# Patient Record
Sex: FEMALE | Race: WHITE | Employment: OTHER | URBAN - METROPOLITAN AREA
[De-identification: names, ages, dates, MRNs, and addresses within clinical notes are randomized per-mention and may not be internally consistent; named-entity substitution may affect disease eponyms.]

---

## 2018-04-24 RX ORDER — LISINOPRIL 5 MG/1
TABLET ORAL DAILY
COMMUNITY

## 2018-05-02 ENCOUNTER — ANESTHESIA EVENT (OUTPATIENT)
Dept: ENDOSCOPY | Age: 78
End: 2018-05-02
Payer: MEDICARE

## 2018-05-03 ENCOUNTER — ANESTHESIA (OUTPATIENT)
Dept: ENDOSCOPY | Age: 78
End: 2018-05-03
Payer: MEDICARE

## 2018-05-03 ENCOUNTER — HOSPITAL ENCOUNTER (OUTPATIENT)
Age: 78
Setting detail: OUTPATIENT SURGERY
Discharge: HOME | End: 2018-05-03
Attending: INTERNAL MEDICINE | Admitting: INTERNAL MEDICINE
Payer: MEDICARE

## 2018-05-03 VITALS
DIASTOLIC BLOOD PRESSURE: 81 MMHG | BODY MASS INDEX: 29.53 KG/M2 | TEMPERATURE: 97.2 F | SYSTOLIC BLOOD PRESSURE: 155 MMHG | RESPIRATION RATE: 20 BRPM | OXYGEN SATURATION: 97 % | HEIGHT: 64 IN | HEART RATE: 92 BPM | WEIGHT: 173 LBS

## 2018-05-03 LAB
BUN BLD-MCNC: 26 MG/DL (ref 7–18)
CHLORIDE BLD-SCNC: 102 MMOL/L (ref 100–108)
GLUCOSE BLD STRIP.AUTO-MCNC: 117 MG/DL (ref 74–106)
HCT VFR BLD CALC: 28 % (ref 36–49)
HGB BLD-MCNC: 9.5 G/DL (ref 12–16)
POTASSIUM BLD-SCNC: 4.3 MMOL/L (ref 3.5–5.5)
SODIUM BLD-SCNC: 138 MMOL/L (ref 136–145)

## 2018-05-03 PROCEDURE — C1726 CATH, BAL DIL, NON-VASCULAR: HCPCS | Performed by: INTERNAL MEDICINE

## 2018-05-03 PROCEDURE — 74011000250 HC RX REV CODE- 250

## 2018-05-03 PROCEDURE — 77030031670 HC DEV INFL ENDOTEK BIG60 MRTM -B: Performed by: INTERNAL MEDICINE

## 2018-05-03 PROCEDURE — 74011000250 HC RX REV CODE- 250: Performed by: NURSE ANESTHETIST, CERTIFIED REGISTERED

## 2018-05-03 PROCEDURE — 76060000031 HC ANESTHESIA FIRST 0.5 HR: Performed by: INTERNAL MEDICINE

## 2018-05-03 PROCEDURE — 74011250636 HC RX REV CODE- 250/636: Performed by: NURSE ANESTHETIST, CERTIFIED REGISTERED

## 2018-05-03 PROCEDURE — 77030019988 HC FCPS ENDOSC DISP BSC -B: Performed by: INTERNAL MEDICINE

## 2018-05-03 PROCEDURE — 88312 SPECIAL STAINS GROUP 1: CPT | Performed by: INTERNAL MEDICINE

## 2018-05-03 PROCEDURE — 77030018846 HC SOL IRR STRL H20 ICUM -A: Performed by: INTERNAL MEDICINE

## 2018-05-03 PROCEDURE — 88313 SPECIAL STAINS GROUP 2: CPT | Performed by: INTERNAL MEDICINE

## 2018-05-03 PROCEDURE — 76040000019: Performed by: INTERNAL MEDICINE

## 2018-05-03 PROCEDURE — 77030008565 HC TBNG SUC IRR ERBE -B: Performed by: INTERNAL MEDICINE

## 2018-05-03 PROCEDURE — 88305 TISSUE EXAM BY PATHOLOGIST: CPT | Performed by: INTERNAL MEDICINE

## 2018-05-03 PROCEDURE — 74011250636 HC RX REV CODE- 250/636

## 2018-05-03 PROCEDURE — 84132 ASSAY OF SERUM POTASSIUM: CPT

## 2018-05-03 RX ORDER — SODIUM CHLORIDE 0.9 % (FLUSH) 0.9 %
5-10 SYRINGE (ML) INJECTION AS NEEDED
Status: CANCELLED | OUTPATIENT
Start: 2018-05-03

## 2018-05-03 RX ORDER — ONDANSETRON 2 MG/ML
4 INJECTION INTRAMUSCULAR; INTRAVENOUS ONCE
Status: CANCELLED | OUTPATIENT
Start: 2018-05-03 | End: 2018-05-03

## 2018-05-03 RX ORDER — SODIUM CHLORIDE, SODIUM LACTATE, POTASSIUM CHLORIDE, CALCIUM CHLORIDE 600; 310; 30; 20 MG/100ML; MG/100ML; MG/100ML; MG/100ML
75 INJECTION, SOLUTION INTRAVENOUS CONTINUOUS
Status: CANCELLED | OUTPATIENT
Start: 2018-05-03

## 2018-05-03 RX ORDER — LIDOCAINE HYDROCHLORIDE 10 MG/ML
0.1 INJECTION, SOLUTION EPIDURAL; INFILTRATION; INTRACAUDAL; PERINEURAL AS NEEDED
Status: DISCONTINUED | OUTPATIENT
Start: 2018-05-03 | End: 2018-05-03 | Stop reason: HOSPADM

## 2018-05-03 RX ORDER — LIDOCAINE HYDROCHLORIDE 20 MG/ML
INJECTION, SOLUTION EPIDURAL; INFILTRATION; INTRACAUDAL; PERINEURAL AS NEEDED
Status: DISCONTINUED | OUTPATIENT
Start: 2018-05-03 | End: 2018-05-03 | Stop reason: HOSPADM

## 2018-05-03 RX ORDER — SODIUM CHLORIDE, SODIUM LACTATE, POTASSIUM CHLORIDE, CALCIUM CHLORIDE 600; 310; 30; 20 MG/100ML; MG/100ML; MG/100ML; MG/100ML
50 INJECTION, SOLUTION INTRAVENOUS CONTINUOUS
Status: DISCONTINUED | OUTPATIENT
Start: 2018-05-03 | End: 2018-05-03 | Stop reason: HOSPADM

## 2018-05-03 RX ORDER — PROPOFOL 10 MG/ML
INJECTION, EMULSION INTRAVENOUS AS NEEDED
Status: DISCONTINUED | OUTPATIENT
Start: 2018-05-03 | End: 2018-05-03 | Stop reason: HOSPADM

## 2018-05-03 RX ADMIN — LIDOCAINE HYDROCHLORIDE 20 MG: 20 INJECTION, SOLUTION EPIDURAL; INFILTRATION; INTRACAUDAL; PERINEURAL at 09:59

## 2018-05-03 RX ADMIN — PROPOFOL 20 MG: 10 INJECTION, EMULSION INTRAVENOUS at 09:59

## 2018-05-03 RX ADMIN — SODIUM CHLORIDE 20 MG: 9 INJECTION INTRAMUSCULAR; INTRAVENOUS; SUBCUTANEOUS at 09:14

## 2018-05-03 RX ADMIN — PROPOFOL 20 MG: 10 INJECTION, EMULSION INTRAVENOUS at 10:04

## 2018-05-03 RX ADMIN — LIDOCAINE HYDROCHLORIDE 20 MG: 20 INJECTION, SOLUTION EPIDURAL; INFILTRATION; INTRACAUDAL; PERINEURAL at 10:04

## 2018-05-03 RX ADMIN — SODIUM CHLORIDE, SODIUM LACTATE, POTASSIUM CHLORIDE, AND CALCIUM CHLORIDE 50 ML/HR: 600; 310; 30; 20 INJECTION, SOLUTION INTRAVENOUS at 09:12

## 2018-05-03 NOTE — PERIOP NOTES
Patient armband removed and shredded  Patient confirmed by two identifiers with discharge instructions prior to being provided to patient and sister in law

## 2018-05-03 NOTE — ANESTHESIA POSTPROCEDURE EVALUATION
Post-Anesthesia Evaluation and Assessment    Patient: Jihan Ashraf MRN: [id number]  SSN: [id number]    YOB: 1940  Age: 76 y.o. Sex: female       Cardiovascular Function/Vital Signs  Visit Vitals    /65    Pulse 88    Temp 36.9 °C (98.4 °F)    Resp 14    Ht 5' 4\" (1.626 m)    Wt 78.5 kg (173 lb)    SpO2 97%    BMI 29.7 kg/m2       Patient is status post MAC anesthesia for Procedure(s):  ENDOSCOPY WITH ESOPHAGEAL DILATATION w biopsies. Nausea/Vomiting: None    Postoperative hydration reviewed and adequate. Pain:  Pain Scale 1: Numeric (0 - 10) (05/03/18 1017)  Pain Intensity 1: 0 (05/03/18 1017)   Managed    Neurological Status: At baseline    Mental Status and Level of Consciousness: Arousable    Pulmonary Status:   O2 Device: Room air (05/03/18 1017)   Adequate oxygenation and airway patent    Complications related to anesthesia: None    Post-anesthesia assessment completed.  No concerns    Signed By: Kayli Franco MD     May 3, 2018

## 2018-05-03 NOTE — ANESTHESIA PREPROCEDURE EVALUATION
Anesthetic History   No history of anesthetic complications            Review of Systems / Medical History  Patient summary reviewed and pertinent labs reviewed    Pulmonary  Within defined limits                 Neuro/Psych   Within defined limits           Cardiovascular    Hypertension: well controlled        Dysrhythmias : atrial fibrillation      Exercise tolerance: >4 METS     GI/Hepatic/Renal     GERD: poorly controlled           Endo/Other             Other Findings   Comments: Documentation of current medication  Current medications obtained, documented and obtained? YES      Risk Factors for Postoperative nausea/vomiting:       History of postoperative nausea/vomiting? NO       Female? YES       Motion sickness? NO       Intended opioid administration for postoperative analgesia? NO      Smoking Abstinence:  Current Smoker? NO  Elective Surgery? YES  Seen preoperatively by anesthesiologist or proxy prior to day of surgery? YES  Pt abstained from smoking 24 hours prior to anesthesia?  N/A    Preventive care/screening for High Blood Pressure:  Aged 18 years and older: YES  Screened for high blood pressure: YES  Patients with high blood pressure referred to primary care provider   for BP management: YES                 Physical Exam    Airway  Mallampati: II  TM Distance: 4 - 6 cm  Neck ROM: normal range of motion   Mouth opening: Normal     Cardiovascular  Regular rate and rhythm,  S1 and S2 normal,  no murmur, click, rub, or gallop             Dental  No notable dental hx       Pulmonary  Breath sounds clear to auscultation               Abdominal  GI exam deferred       Other Findings            Anesthetic Plan    ASA: 3            Induction: Intravenous  Anesthetic plan and risks discussed with: Patient

## 2018-05-03 NOTE — IP AVS SNAPSHOT
Dorie Garrett 
 
 
 [address] [facility] [address] [facility] Patient: Iva Ordonez MRN: [id number] :1940 About your hospitalization You were admitted on:  May 3, 2018 You last received care in the:  [facility] PHASE 2 RECOVERY You were discharged on:  May 3, 2018 Why you were hospitalized Your primary diagnosis was:  Not on File Follow-up Information Follow up With Details Comments Contact Info Gary Roe NP   [address] [address] 
[phone] Leonard Foote MD   [address] [facility] [address] [facility] [address] 
[phone] Discharge Orders None A check desiree indicates which time of day the medication should be taken. My Medications CONTINUE taking these medications Instructions Each Dose to Equal  
 Morning Noon Evening Bedtime ALLEGRA 180 mg tablet Generic drug:  fexofenadine Your last dose was: Your next dose is: Take  by mouth daily. aspirin 81 mg tablet Your last dose was: Your next dose is:    
   
   
 81 Tabs. Take 81 mg by Mouth Once a Day. 81 Tab COLACE 100 mg capsule Generic drug:  docusate sodium Your last dose was: Your next dose is: Take 100 mg by mouth daily. 100 mg ESTRACE 1 mg tablet Generic drug:  estradiol Your last dose was: Your next dose is: Take 1 mg by Mouth Once a Day. HYDROcodone-acetaminophen 5-325 mg per tablet Commonly known as:  Bolivar Medina Your last dose was: Your next dose is: Take 1 tablet by mouth every four (4) hours as needed for Pain. 1 Tab  
    
   
   
   
  
 lisinopril 5 mg tablet Commonly known as:  Kevin Gilmore  
   
 Your last dose was: Your next dose is: Take  by mouth daily. MIRAPEX 0.125 mg tablet Generic drug:  pramipexole Your last dose was: Your next dose is: Take 1 Tab by Mouth 3 Times Daily. naproxen 500 mg tablet Commonly known as:  NAPROSYN Your last dose was: Your next dose is: Take 500 mg by mouth daily. 500 mg NEURONTIN 300 mg capsule Generic drug:  gabapentin Your last dose was: Your next dose is:    
   
   
 300 mg. Take 1 Cap by Mouth 3 Times Daily. 300 mg NexIUM 40 mg capsule Generic drug:  esomeprazole Your last dose was: Your next dose is: Take 1 Cap by Mouth Once a Day. NORVASC 10 mg tablet Generic drug:  amLODIPine Your last dose was: Your next dose is:    
   
   
 10 mg. Take 1 Tab by Mouth Once a Day. 10 mg  
    
   
   
   
  
 potassium chloride 10 mEq tablet Commonly known as:  KLOR-CON Your last dose was: Your next dose is: Take 1 Tab by mouth daily. 10 mEq  
    
   
   
   
  
 traMADol 50 mg tablet Commonly known as:  ULTRAM  
   
Your last dose was: Your next dose is: Take 50 mg by mouth every six (6) hours as needed for Pain. 50 mg  
    
   
   
   
  
 triamcinolone acetonide 0.1 % ointment Commonly known as:  KENALOG Your last dose was: Your next dose is:    
   
   
 Apply  to affected area two (2) times a day. use thin layer Opioid Education Prescription Opioids: What You Need to Know: 
 
Prescription opioids can be used to help relieve moderate-to-severe pain and are often prescribed following a surgery or injury, or for certain health conditions.   These medications can be an important part of treatment but also come with serious risks. Opioids are strong pain medicines. Examples include hydrocodone, oxycodone, fentanyl, and morphine. Heroin is an example of an illegal opioid. It is important to work with your health care provider to make sure you are getting the safest, most effective care. WHAT ARE THE RISKS AND SIDE EFFECTS OF OPIOID USE? Prescription opioids carry serious risks of addiction and overdose, especially with prolonged use. An opioid overdose, often marked by slow breathing, can cause sudden death. The use of prescription opioids can have a number of side effects as well, even when taken as directed. · Tolerance-meaning you might need to take more of a medication for the same pain relief · Physical dependence-meaning you have symptoms of withdrawal when the medication is stopped. Withdrawal symptoms can include nausea, sweating, chills, diarrhea, stomach cramps, and muscle aches. Withdrawal can last up to several weeks, depending on which drug you took and how long you took it. · Increased sensitivity to pain · Constipation · Nausea, vomiting, and dry mouth · Sleepiness and dizziness · Confusion · Depression · Low levels of testosterone that can result in lower sex drive, energy, and strength · Itching and sweating RISKS ARE GREATER WITH:      
· History of drug misuse, substance use disorder, or overdose · Mental health conditions (such as depression or anxiety) · Sleep apnea · Older age (72 years or older) · Pregnancy Avoid alcohol while taking prescription opioids. Also, unless specifically advised by your health care provider, medications to avoid include: · Benzodiazepines (such as Xanax or Valium) · Muscle relaxants (such as Soma or Flexeril) · Hypnotics (such as Ambien or Lunesta) · Other prescription opioids KNOW YOUR OPTIONS Talk to your health care provider about ways to manage your pain that don't involve prescription opioids. Some of these options may actually work better and have fewer risks and side effects. Options may include: 
· Pain relievers such as acetaminophen, ibuprofen, and naproxen · Some medications that are also used for depression or seizures · Physical therapy and exercise · Counseling to help patients learn how to cope better with triggers of pain and stress. · Application of heat or cold compress · Massage therapy · Relaxation techniques Be Informed Make sure you know the name of your medication, how much and how often to take it, and its potential risks & side effects. IF YOU ARE PRESCRIBED OPIOIDS FOR PAIN: 
· Never take opioids in greater amounts or more often than prescribed. Remember the goal is not to be pain-free but to manage your pain at a tolerable level. · Follow up with your primary care provider to: · Work together to create a plan on how to manage your pain. · Talk about ways to help manage your pain that don't involve prescription opioids. · Talk about any and all concerns and side effects. · Help prevent misuse and abuse. · Never sell or share prescription opioids · Help prevent misuse and abuse. · Store prescription opioids in a secure place and out of reach of others (this may include visitors, children, friends, and family). · Safely dispose of unused/unwanted prescription opioids: Find your community drug take-back program or your pharmacy mail-back program, or flush them down the toilet, following guidance from the Food and Drug Administration (www.fda.gov/Drugs/ResourcesForYou). · Visit www.cdc.gov/drugoverdose to learn about the risks of opioid abuse and overdose. · If you believe you may be struggling with addiction, tell your health care provider and ask for guidance or call [address] [place], [place] [address] at 3-062-687-QJCP. Discharge Instructions Esophageal Dilation: What to Expect at [facility] Your Recovery After you have esophageal dilation, you will stay at the hospital or surgery center for 1 to 2 hours. This will allow the medicine to wear off. You will be able to go home after your doctor or nurse checks to make sure you are not having any problems. This care sheet gives you a general idea about how long it will take for you to recover. But each person recovers at a different pace. Follow the steps below to get better as quickly as possible. How can you care for yourself at home? Activity ? · Rest as much as you need to after you go home. ? · You should be able to go back to your usual activities the day after the procedure. Diet ? · Follow your doctor's directions for eating after the procedure. ? · Drink plenty of fluids (unless your doctor has told you not to). Medicines ? · Your doctor will tell you if and when you can restart your medicines. He or she will also give you instructions about taking any new medicines. ? · If you take blood thinners, such as warfarin (Coumadin), clopidogrel (Plavix), or aspirin, be sure to talk to your doctor. He or she will tell you if and when to start taking those medicines again. Make sure that you understand exactly what your doctor wants you to do. ? · If you have a sore throat the day after the procedure, use an over-the-counter spray to numb your throat. Sucking on throat lozenges and gargling with warm salt water may also help relieve your symptoms. Follow-up care is a key part of your treatment and safety. Be sure to make and go to all appointments, and call your doctor if you are having problems. It's also a good idea to know your test results and keep a list of the medicines you take. When should you call for help? Call 911 anytime you think you may need emergency care. For example, call if: 
? · You passed out (lost consciousness). ? · You have trouble breathing. ? · Your stools are maroon or very bloody ? Call your doctor now or seek immediate medical care if: 
? · You have new or worse belly pain. ? · You have a fever. ? · You have new or more blood in your stools. ? · You are sick to your stomach and cannot drink fluids. ? · You cannot pass stools or gas. ? · You have pain that does not get better after you take pain medicine. ? Watch closely for changes in your health, and be sure to contact your doctor if: 
? · Your throat still hurts after a day or two. ? · You do not get better as expected. Where can you learn more? Go to http://shawn-stevie.info/. Enter L524 in the search box to learn more about \"Esophageal Dilation: What to Expect at Home. \" Current as of: May 12, 2017 Content Version: 11.4 © 6320-9014 [employer]. Care instructions adapted under license by [employer] (which disclaims liability or warranty for this information). If you have questions about a medical condition or this instruction, always ask your healthcare professional. [address] any warranty or liability for your use of this information. Candidiasis: Care Instructions Your Care Instructions Candidiasis (say \"hoy-xbk-WI-uh-quincy\") is a yeast infection. Yeast normally lives in your body. But it can cause problems if your body's defenses don't work as they should. Some medicines can increase your chance of getting a yeast infection. These include antibiotics, steroids, and cancer drugs. And some diseases like AIDS and diabetes can make you more likely to get yeast infections. There are different types of yeast infections. Masood Payment is a yeast infection in the mouth. It usually occurs in people with weak immune systems. It causes white patches inside the mouth and throat.  
Yeast infections of the skin usually occur in skin folds where the skin stays moist. They cause red, oozing patches on your skin. Babies can get these infections under the diaper. People who often wear gloves can get them on their hands. Many women get vaginal yeast infections. They are most common when women take antibiotics. These infections can cause the vagina to itch and burn. They also cause white discharge that looks like cottage cheese. In rare cases, yeast infects the blood. This can cause serious disease. This kind of infection is treated with medicine given through a needle into a vein (IV). After you start treatment, a yeast infection usually goes away quickly. But if your immune system is weak, the infection may come back. Tell your doctor if you get yeast infections often. Follow-up care is a key part of your treatment and safety. Be sure to make and go to all appointments, and call your doctor if you are having problems. It's also a good idea to know your test results and keep a list of the medicines you take. How can you care for yourself at home? · Take your medicines exactly as prescribed. Call your doctor if you think you are having a problem with your medicine. · Use antibiotics only as directed by your doctor. · Eat yogurt with live cultures. It has bacteria called lactobacillus. It may help prevent some types of yeast infections. · Keep your skin clean and dry. Put powder on moist places. · If you are using a cream or suppository to treat a vaginal yeast infection, don't use condoms or a diaphragm. Use a different type of birth control. · Eat a healthy diet and get regular exercise. This will help keep your immune system strong. When should you call for help? Watch closely for changes in your health, and be sure to contact your doctor if: 
? · You do not get better as expected. Where can you learn more? Go to http://shawn-stevie.info/. Enter G012 in the search box to learn more about \"Candidiasis: Care Instructions. \" 
 Current as of: October 13, 2016 Content Version: 11.4 © 0012-4171 [employer]. Care instructions adapted under license by [employer] (which disclaims liability or warranty for this information). If you have questions about a medical condition or this instruction, always ask your healthcare professional. [address] any warranty or liability for your use of this information. Fluconazole (Diflucan) - (By mouth) Why this medicine is used:  
Prevents and treats fungal infections. Contact a nurse or doctor right away if you have: · Blistering, peeling, red skin rash · Fast, pounding, or uneven heartbeat; lightheadedness or fainting · Dark urine or pale stools, vomiting, loss of appetite · Yellow skin or eyes Common side effects: · Mild nausea, vomiting, stomach pain, diarrhea 
· Headache © 2017 [address] Information is for End User's use only and may not be sold, redistributed or otherwise used for commercial purposes. DISCHARGE SUMMARY from Nurse PATIENT INSTRUCTIONS: 
 
 
F-face looks uneven A-arms unable to move or move unevenly S-speech slurred or non-existent T-time-call 911 as soon as signs and symptoms begin-DO NOT go Back to bed or wait to see if you get better-TIME IS BRAIN. Warning Signs of HEART ATTACK Call 911 if you have these symptoms: 
? Chest discomfort. Most heart attacks involve discomfort in the center of the chest that lasts more than a few minutes, or that goes away and comes back. It can feel like uncomfortable pressure, squeezing, fullness, or pain. ? Discomfort in other areas of the upper body. Symptoms can include pain or discomfort in one or both arms, the back, neck, jaw, or stomach. ? Shortness of breath with or without chest discomfort. ? Other signs may include breaking out in a cold sweat, nausea, or lightheadedness. Don't wait more than five minutes to call [address]! Fast action can save your life. Calling 911 is almost always the fastest way to get lifesaving treatment. Emergency Medical Services staff can begin treatment when they arrive  up to an hour sooner than if someone gets to the hospital by car. The discharge information has been reviewed with the patient and sister-in-law. The patient and sister-in-law verbalized understanding. Discharge medications reviewed with the patient and sister-in-law and appropriate educational materials and side effects teaching were provided. ___________________________________________________________________________________________________________________________________ Introducing \[facility]\"" & HEALTH SERVICES! Emmanuel Barrett introduces [employer] patient portal. Now you can access parts of your medical record, email your doctor's office, and request medication refills online. 1. In your internet browser, go to [email or web address] 2. Click on the First Time User? Click Here link in the Sign In box. You will see the New Member Sign Up page. 3. Enter your [employer] Access Code exactly as it appears below. You will not need to use this code after youve completed the sign-up process. If you do not sign up before the expiration date, you must request a new code. · [employer] Access Code: [email or web address] Expires: 7/29/2018  9:55 AM 
 
4. Enter the last four digits of your Social Security Number (xxxx) and Date of Birth (mm/dd/yyyy) as indicated and click Submit. You will be taken to the next sign-up page. 5. Create a [employer] ID. This will be your [employer] login ID and cannot be changed, so think of one that is secure and easy to remember. 6. Create a [employer] password. You can change your password at any time. 7. Enter your Password Reset Question and Answer. This can be used at a later time if you forget your password. 8. Enter your e-mail address. You will receive e-mail notification when new information is available in [address] 9. Click Sign Up. You can now view and download portions of your medical record. 10. Click the Download Summary menu link to download a portable copy of your medical information. If you have questions, please visit the Frequently Asked Questions section of the [employer] website. Remember, [employer] is NOT to be used for urgent needs. For medical emergencies, dial 911. Now available from your iPhone and Android! Introducing Nav Davalos As a [employer] patient, I wanted to make you aware of our electronic visit tool called Nav Davalos. [employer] 24/7 allows you to connect within minutes with a medical provider 24 hours a day, seven days a week via a mobile device or tablet or logging into a secure website from your computer. You can access Nav Davalos from anywhere in the [place]. A virtual visit might be right for you when you have a simple condition and feel like you just dont want to get out of bed, or cant get away from work for an appointment, when your regular [employer] provider is not available (evenings, weekends or holidays), or when youre out of town and need minor care. Electronic visits cost only $49 and if the [employer] 24/7 provider determines a prescription is needed to treat your condition, one can be electronically transmitted to a nearby pharmacy*. Please take a moment to enroll today if you have not already done so. The enrollment process is free and takes just a few minutes. To enroll, please download the [employer] 24/7 meng to your tablet or phone, or visit [email or web address] to enroll on your computer.    
And, as an [address] [place] [address] patient with a [employer] account, the results of your visits will be scanned into your electronic medical record and your primary care provider will be able to view the scanned results. We urge you to continue to see your regular [facility] provider for your ongoing medical care. And while your primary care provider may not be the one available when you seek a Nav Fallon[employer] virtual visit, the peace of mind you get from getting a real diagnosis real time can be priceless. For more information on [employer], view our Frequently Asked Questions (FAQs) at [email or web address]. Sincerely, 
 
Rebecca Alexis MD 
Chief Medical Officer [address] *:  certain medications cannot be prescribed via [employer] Providers Seen During Your Hospitalization Provider Specialty Primary office phone Amelia Gaines MD Gastroenterology [phone] Your Primary Care Physician (PCP) Primary Care Physician Office Phone Office Fax Shelia Patricia [phone] You are allergic to the following Allergen Reactions Ciprofloxacin Nausea Only Penicillins Rash Nausea Only Sulfa (Sulfonamide Antibiotics) Other (comments) Per patient stated made her feel bad Recent Documentation Height Weight BMI Smoking Status 1.626 m 78.5 kg 29.7 kg/m2 Never Smoker Emergency Contacts Name Discharge Info Relation Home Work Mobile [facility] [place] DISCHARGE CAREGIVER [3] Daughter [21] [phone] PadminiGilbert DISCHARGE CAREGIVER [3] Son [22] [phone] Patient Belongings The following personal items are in your possession at time of discharge: 
  Dental Appliances: None  Visual Aid: Glasses Please provide this summary of care documentation to your next provider. Signatures-by signing, you are acknowledging that this After Visit Summary has been reviewed with you and you have received a copy. Patient Signature:  ____________________________________________________________ Date:  ____________________________________________________________  
  
Gearldine Moulds Provider Signature:  ____________________________________________________________ Date:  ____________________________________________________________

## 2018-05-03 NOTE — DISCHARGE INSTRUCTIONS
Esophageal Dilation: What to Expect at [address] [place] [address]  After you have esophageal dilation, you will stay at the hospital or surgery center for 1 to 2 hours. This will allow the medicine to wear off. You will be able to go home after your doctor or nurse checks to make sure you are not having any problems. This care sheet gives you a general idea about how long it will take for you to recover. But each person recovers at a different pace. Follow the steps below to get better as quickly as possible. How can you care for yourself at home? Activity  ? · Rest as much as you need to after you go home. ? · You should be able to go back to your usual activities the day after the procedure. Diet  ? · Follow your doctor's directions for eating after the procedure. ? · Drink plenty of fluids (unless your doctor has told you not to). Medicines  ? · Your doctor will tell you if and when you can restart your medicines. He or she will also give you instructions about taking any new medicines. ? · If you take blood thinners, such as warfarin (Coumadin), clopidogrel (Plavix), or aspirin, be sure to talk to your doctor. He or she will tell you if and when to start taking those medicines again. Make sure that you understand exactly what your doctor wants you to do. ? · If you have a sore throat the day after the procedure, use an over-the-counter spray to numb your throat. Sucking on throat lozenges and gargling with warm salt water may also help relieve your symptoms. Follow-up care is a key part of your treatment and safety. Be sure to make and go to all appointments, and call your doctor if you are having problems. It's also a good idea to know your test results and keep a list of the medicines you take. When should you call for help? Call 911 anytime you think you may need emergency care. For example, call if:  ? · You passed out (lost consciousness). ? · You have trouble breathing.    ? · Your stools are maroon or very bloody   ? Call your doctor now or seek immediate medical care if:  ? · You have new or worse belly pain. ? · You have a fever. ? · You have new or more blood in your stools. ? · You are sick to your stomach and cannot drink fluids. ? · You cannot pass stools or gas. ? · You have pain that does not get better after you take pain medicine. ? Watch closely for changes in your health, and be sure to contact your doctor if:  ? · Your throat still hurts after a day or two. ? · You do not get better as expected. Where can you learn more? Go to http://shawn-stevie.info/. Enter S872 in the search box to learn more about \"Esophageal Dilation: What to Expect at Home. \"  Current as of: May 12, 2017  Content Version: 11.4  © 4489-7145 [employer]. Care instructions adapted under license by [employer] (which disclaims liability or warranty for this information). If you have questions about a medical condition or this instruction, always ask your healthcare professional. [address] any warranty or liability for your use of this information. Candidiasis: Care Instructions  Your Care Instructions  Candidiasis (say \"usp-qsl-FH-uh-quincy\") is a yeast infection. Yeast normally lives in your body. But it can cause problems if your body's defenses don't work as they should. Some medicines can increase your chance of getting a yeast infection. These include antibiotics, steroids, and cancer drugs. And some diseases like AIDS and diabetes can make you more likely to get yeast infections. There are different types of yeast infections. Kaleta Dennis is a yeast infection in the mouth. It usually occurs in people with weak immune systems. It causes white patches inside the mouth and throat. Yeast infections of the skin usually occur in skin folds where the skin stays moist. They cause red, oozing patches on your skin.  Babies can get these infections under the diaper. People who often wear gloves can get them on their hands. Many women get vaginal yeast infections. They are most common when women take antibiotics. These infections can cause the vagina to itch and burn. They also cause white discharge that looks like cottage cheese. In rare cases, yeast infects the blood. This can cause serious disease. This kind of infection is treated with medicine given through a needle into a vein (IV). After you start treatment, a yeast infection usually goes away quickly. But if your immune system is weak, the infection may come back. Tell your doctor if you get yeast infections often. Follow-up care is a key part of your treatment and safety. Be sure to make and go to all appointments, and call your doctor if you are having problems. It's also a good idea to know your test results and keep a list of the medicines you take. How can you care for yourself at home? · Take your medicines exactly as prescribed. Call your doctor if you think you are having a problem with your medicine. · Use antibiotics only as directed by your doctor. · Eat yogurt with live cultures. It has bacteria called lactobacillus. It may help prevent some types of yeast infections. · Keep your skin clean and dry. Put powder on moist places. · If you are using a cream or suppository to treat a vaginal yeast infection, don't use condoms or a diaphragm. Use a different type of birth control. · Eat a healthy diet and get regular exercise. This will help keep your immune system strong. When should you call for help? Watch closely for changes in your health, and be sure to contact your doctor if:  ? · You do not get better as expected. Where can you learn more? Go to http://shawn-stevie.info/. Enter I105 in the search box to learn more about \"Candidiasis: Care Instructions. \"  Current as of: October 13, 2016  Content Version: 11.4  © 5117-8788 [employer], [employer].  Care instructions adapted under license by [employer] (which disclaims liability or warranty for this information). If you have questions about a medical condition or this instruction, always ask your healthcare professional. [address] any warranty or liability for your use of this information. Fluconazole (Diflucan) - (By mouth)   Why this medicine is used:   Prevents and treats fungal infections. Contact a nurse or doctor right away if you have:  · Blistering, peeling, red skin rash  · Fast, pounding, or uneven heartbeat; lightheadedness or fainting  · Dark urine or pale stools, vomiting, loss of appetite  · Yellow skin or eyes     Common side effects:  · Mild nausea, vomiting, stomach pain, diarrhea  · Headache  © 2017 [address] [employer]  Information is for End User's use only and may not be sold, redistributed or otherwise used for commercial purposes. DISCHARGE SUMMARY from Nurse    PATIENT INSTRUCTIONS:    After general anesthesia or intravenous sedation, for 24 hours or while taking prescription Narcotics:  · Limit your activities  · Do not drive and operate hazardous machinery  · Do not make important personal or business decisions  · Do  not drink alcoholic beverages  · If you have not urinated within 8 hours after discharge, please contact your surgeon on call. Report the following to your surgeon:  · Excessive pain, swelling, redness or odor of or around the surgical area  · Temperature over 100.5  · Nausea and vomiting lasting longer than 4 hours or if unable to take medications  · Any signs of decreased circulation or nerve impairment to extremity: change in color, persistent  numbness, tingling, coldness or increase pain  · Any questions    *  Please give a list of your current medications to your Primary Care Provider.     *  Please update this list whenever your medications are discontinued, doses are      changed, or new medications (including over-the-counter products) are added. *  Please carry medication information at all times in case of emergency situations. These are general instructions for a healthy lifestyle:    No smoking/ No tobacco products/ Avoid exposure to second hand smoke  Surgeon General's Warning:  Quitting smoking now greatly reduces serious risk to your health. Obesity, smoking, and sedentary lifestyle greatly increases your risk for illness    A healthy diet, regular physical exercise & weight monitoring are important for maintaining a healthy lifestyle    You may be retaining fluid if you have a history of heart failure or if you experience any of the following symptoms:  Weight gain of 3 pounds or more overnight or 5 pounds in a week, increased swelling in our hands or feet or shortness of breath while lying flat in bed. Please call your doctor as soon as you notice any of these symptoms; do not wait until your next office visit. Recognize signs and symptoms of STROKE:    F-face looks uneven    A-arms unable to move or move unevenly    S-speech slurred or non-existent    T-time-call 911 as soon as signs and symptoms begin-DO NOT go       Back to bed or wait to see if you get better-TIME IS BRAIN. Warning Signs of HEART ATTACK     Call 911 if you have these symptoms:   Chest discomfort. Most heart attacks involve discomfort in the center of the chest that lasts more than a few minutes, or that goes away and comes back. It can feel like uncomfortable pressure, squeezing, fullness, or pain.  Discomfort in other areas of the upper body. Symptoms can include pain or discomfort in one or both arms, the back, neck, jaw, or stomach.  Shortness of breath with or without chest discomfort.  Other signs may include breaking out in a cold sweat, nausea, or lightheadedness. Don't wait more than five minutes to call 911 - MINUTES MATTER! Fast action can save your life.  Calling 911 is almost always the fastest way to get lifesaving treatment. Emergency Medical Services staff can begin treatment when they arrive -- up to an hour sooner than if someone gets to the hospital by car. The discharge information has been reviewed with the patient and sister-in-law. The patient and sister-in-law verbalized understanding. Discharge medications reviewed with the patient and sister-in-law and appropriate educational materials and side effects teaching were provided.   ___________________________________________________________________________________________________________________________________

## 2018-10-10 ENCOUNTER — HOSPITAL ENCOUNTER (OUTPATIENT)
Dept: LAB | Age: 78
Discharge: HOME | End: 2018-10-10
Payer: MEDICARE

## 2018-10-10 ENCOUNTER — HOSPITAL ENCOUNTER (OUTPATIENT)
Dept: ONCOLOGY | Age: 78
Discharge: HOME | End: 2018-10-10

## 2018-10-10 ENCOUNTER — OFFICE VISIT (OUTPATIENT)
Dept: ONCOLOGY | Age: 78
End: 2018-10-10

## 2018-10-10 VITALS
RESPIRATION RATE: 16 BRPM | SYSTOLIC BLOOD PRESSURE: 126 MMHG | HEIGHT: 64 IN | DIASTOLIC BLOOD PRESSURE: 64 MMHG | BODY MASS INDEX: 30.39 KG/M2 | WEIGHT: 178 LBS | OXYGEN SATURATION: 100 % | HEART RATE: 101 BPM | TEMPERATURE: 97.1 F

## 2018-10-10 DIAGNOSIS — D50.8 IRON DEFICIENCY ANEMIA SECONDARY TO INADEQUATE DIETARY IRON INTAKE: ICD-10-CM

## 2018-10-10 DIAGNOSIS — D72.9 NEUTROPHILIC LEUKOCYTOSIS: ICD-10-CM

## 2018-10-10 DIAGNOSIS — D50.8 IRON DEFICIENCY ANEMIA SECONDARY TO INADEQUATE DIETARY IRON INTAKE: Primary | ICD-10-CM

## 2018-10-10 DIAGNOSIS — D75.839 THROMBOCYTOSIS: ICD-10-CM

## 2018-10-10 PROBLEM — D69.6 THROMBOCYTOPENIA (HCC): Status: ACTIVE | Noted: 2018-10-10

## 2018-10-10 PROBLEM — A41.9 SEPSIS (HCC): Status: ACTIVE | Noted: 2017-10-18

## 2018-10-10 PROBLEM — I48.0 PAROXYSMAL ATRIAL FIBRILLATION (HCC): Status: ACTIVE | Noted: 2017-10-25

## 2018-10-10 PROBLEM — J18.9 CAP (COMMUNITY ACQUIRED PNEUMONIA): Status: ACTIVE | Noted: 2017-10-18

## 2018-10-10 PROBLEM — R07.9 CHEST PAIN: Status: ACTIVE | Noted: 2018-03-09

## 2018-10-10 PROBLEM — I50.32 DIASTOLIC DYSFUNCTION WITH CHRONIC HEART FAILURE (HCC): Status: ACTIVE | Noted: 2018-05-01

## 2018-10-10 PROBLEM — I48.91 A-FIB (HCC): Status: ACTIVE | Noted: 2018-06-29

## 2018-10-10 PROBLEM — D69.6 THROMBOCYTOPENIA (HCC): Status: RESOLVED | Noted: 2018-10-10 | Resolved: 2018-10-10

## 2018-10-10 PROBLEM — R07.89 CHEST TIGHTNESS: Status: ACTIVE | Noted: 2018-10-10

## 2018-10-10 PROBLEM — J18.9 COMMUNITY ACQUIRED PNEUMONIA OF LEFT LOWER LOBE OF LUNG: Status: ACTIVE | Noted: 2017-10-18

## 2018-10-10 LAB
BASOPHILS # BLD: 0.1 K/UL (ref 0–0.1)
BASOPHILS NFR BLD: 1 % (ref 0–2)
COMMENT , 490046: NORMAL
DIFFERENTIAL METHOD BLD: ABNORMAL
EOSINOPHIL # BLD: 0.4 K/UL (ref 0–0.4)
EOSINOPHIL NFR BLD: 4 % (ref 0–5)
ERYTHROCYTE [DISTWIDTH] IN BLOOD BY AUTOMATED COUNT: 17.2 % (ref 11.6–14.5)
FLOW INTERPRETATION: NORMAL
HCT VFR BLD AUTO: 22.9 % (ref 35–45)
HGB BLD-MCNC: 6.7 G/DL (ref 12–16)
LYMPHOCYTES # BLD: 2.4 K/UL (ref 0.9–3.6)
LYMPHOCYTES NFR BLD: 23 % (ref 21–52)
MCH RBC QN AUTO: 22.6 PG (ref 24–34)
MCHC RBC AUTO-ENTMCNC: 29.3 G/DL (ref 31–37)
MCV RBC AUTO: 77.4 FL (ref 74–97)
MONOCYTES # BLD: 0.7 K/UL (ref 0.05–1.2)
MONOCYTES NFR BLD: 7 % (ref 3–10)
NEUTS SEG # BLD: 6.7 K/UL (ref 1.8–8)
NEUTS SEG NFR BLD: 65 % (ref 40–73)
PATHOLOGIST NAME: NORMAL
PLATELET # BLD AUTO: 483 K/UL (ref 135–420)
PMV BLD AUTO: 8.6 FL (ref 9.2–11.8)
RBC # BLD AUTO: 2.96 M/UL (ref 4.2–5.3)
RBC MORPH BLD: ABNORMAL
RBC MORPH BLD: ABNORMAL
SPECIMEN SOURCE: NORMAL
SPECIMEN SOURCE: NORMAL
WBC # BLD AUTO: 10.3 K/UL (ref 4.6–13.2)

## 2018-10-10 PROCEDURE — 88184 FLOWCYTOMETRY/ TC 1 MARKER: CPT | Performed by: INTERNAL MEDICINE

## 2018-10-10 PROCEDURE — 80053 COMPREHEN METABOLIC PANEL: CPT | Performed by: INTERNAL MEDICINE

## 2018-10-10 PROCEDURE — 36415 COLL VENOUS BLD VENIPUNCTURE: CPT | Performed by: INTERNAL MEDICINE

## 2018-10-10 PROCEDURE — 82668 ASSAY OF ERYTHROPOIETIN: CPT | Performed by: INTERNAL MEDICINE

## 2018-10-10 PROCEDURE — 83540 ASSAY OF IRON: CPT | Performed by: INTERNAL MEDICINE

## 2018-10-10 PROCEDURE — 88185 FLOWCYTOMETRY/TC ADD-ON: CPT | Performed by: INTERNAL MEDICINE

## 2018-10-10 PROCEDURE — 84165 PROTEIN E-PHORESIS SERUM: CPT | Performed by: INTERNAL MEDICINE

## 2018-10-10 PROCEDURE — 82728 ASSAY OF FERRITIN: CPT | Performed by: INTERNAL MEDICINE

## 2018-10-10 RX ORDER — NITROFURANTOIN 25; 75 MG/1; MG/1
CAPSULE ORAL
COMMUNITY
Start: 2018-09-26 | End: 2020-06-19 | Stop reason: SDUPTHER

## 2018-10-10 RX ORDER — NYSTATIN 100000 [USP'U]/ML
SUSPENSION ORAL
Refills: 0 | COMMUNITY
Start: 2018-08-20 | End: 2021-06-09

## 2018-10-10 RX ORDER — DILTIAZEM HYDROCHLORIDE 360 MG/1
CAPSULE, EXTENDED RELEASE ORAL
COMMUNITY
Start: 2018-08-05

## 2018-10-10 RX ORDER — APIXABAN 5 MG/1
TABLET, FILM COATED ORAL
COMMUNITY
Start: 2018-09-10

## 2018-10-10 RX ORDER — HYDROXYCHLOROQUINE SULFATE 200 MG/1
TABLET, FILM COATED ORAL
COMMUNITY
Start: 2018-10-03

## 2018-10-10 RX ORDER — MUPIROCIN 20 MG/G
OINTMENT TOPICAL
COMMUNITY
Start: 2018-10-04

## 2018-10-10 NOTE — MR AVS SNAPSHOT
[address] [facility], [place] 
 
 
 [address], [address] [facility] [address] 
[phone] Patient: Nelida Gray MRN: [id number] :1940 Visit Information Date & Time Provider Department Dept. Phone Encounter #  
 10/10/2018 11:00 AM Felipe Winters MD [facility] Medical Oncology [phone] [id number] Your Appointments 10/24/2018 11:00 AM  
Office Visit with Felipe Winters MD  
[address]  Oncology [address] [facility]) Appt Note: 2 weeks/results [address], [address] [facility] [address] [place] [address], [address] [place] [address] Upcoming Health Maintenance Date Due DTaP/Tdap/Td series (1 - Tdap) 10/13/1959 Shingrix Vaccine Age 50> (1 of 2) 10/13/1988 GLAUCOMA SCREENING Q2Y 10/13/2003 Bone Densitometry (Dexa) Screening 10/13/2003 MEDICARE YEARLY EXAM 3/14/2018 Influenza Age 5 to Adult 2018 Allergies as of 10/10/2018  Review Complete On: 10/10/2018 By: Felipe Winters MD  
  
 Severity Noted Reaction Type Reactions Ciprofloxacin  2018    Nausea Only Penicillins  2014    Rash, Nausea Only Sulfa (Sulfonamide Antibiotics)  10/09/2014    Other (comments) Per patient stated made her feel bad Current Immunizations  Never Reviewed Name Date Influenza Vaccine 10/20/2017 12:00 AM, 10/4/2013 12:00 AM, 10/11/2012 12:00 AM  
 Influenza Vaccine (Quad) 2011 12:00 AM  
 Pneumococcal Conjugate (PCV-13) 2018 12:00 AM  
 Pneumococcal Polysaccharide (PPSV-23) 2014 12:00 AM  
  
 Not reviewed this visit You Were Diagnosed With   
  
 Codes Comments Iron deficiency anemia secondary to inadequate dietary iron intake    -  Primary ICD-10-CM: D50.8 ICD-9-CM: 280. 1 Thrombocytosis ([address]     ICD-10-CM: D47.3 ICD-9-CM: 238.71 Neutrophilic leukocytosis     ICD-10-CM: D72.9 ICD-9-CM: 944. 8 Vitals BP Pulse Temp Resp Height(growth percentile) Weight(growth percentile) 126/64 (!) 101 97.1 °F (36.2 °C) (Oral) 16 5' 4\" (1.626 m) 178 lb (80.7 kg) SpO2 BMI Smoking Status 100% 30.55 kg/m2 Never Smoker BMI and BSA Data Body Mass Index Body Surface Area 30.55 kg/m 2 1.91 m 2 Your Updated Medication List  
  
   
This list is accurate as of 10/10/18 12:30 PM.  Always use your most recent med list. ALLEGRA 180 mg tablet Generic drug:  fexofenadine Take  by mouth daily. aspirin 81 mg tablet 81 Tabs. Take 81 mg by Mouth Once a Day. COLACE 100 mg capsule Generic drug:  docusate sodium Take 100 mg by mouth daily. dilTIAZem 360 mg ER capsule Commonly known as:  TIAZAC  
  
 ELIQUIS 5 mg tablet Generic drug:  apixaban ESTRACE 1 mg tablet Generic drug:  estradiol Take 1 mg by Mouth Once a Day. HYDROcodone-acetaminophen 5-325 mg per tablet Commonly known as:  Almeta Reasoner Take 1 tablet by mouth every four (4) hours as needed for Pain.  
  
 hydroxychloroquine 200 mg tablet Commonly known as:  PLAQUENIL  
  
 lisinopril 5 mg tablet Commonly known as:  Cydne Bella Take  by mouth daily. MIRAPEX 0.125 mg tablet Generic drug:  pramipexole Take 1 Tab by Mouth 3 Times Daily. mupirocin 2 % ointment Commonly known as:  BACTROBAN  
  
 naproxen 500 mg tablet Commonly known as:  NAPROSYN Take 500 mg by mouth daily. NEURONTIN 300 mg capsule Generic drug:  gabapentin 300 mg. Take 1 Cap by Mouth 3 Times Daily. NexIUM 40 mg capsule Generic drug:  esomeprazole Take 1 Cap by Mouth Once a Day. nitrofurantoin (macrocrystal-monohydrate) 100 mg capsule Commonly known as:  MACROBID  
  
 NORVASC 10 mg tablet Generic drug:  amLODIPine 10 mg. Take 1 Tab by Mouth Once a Day. nystatin 100,000 unit/mL suspension Commonly known as:  MYCOSTATIN  
 RINSE WITH 2-5MLS BY MOUTH AND SWALLOW FOUR TIMES A DAY  
  
 potassium chloride 10 mEq tablet Commonly known as:  KLOR-CON Take 1 Tab by mouth daily. traMADol 50 mg tablet Commonly known as:  ULTRAM  
Take 50 mg by mouth every six (6) hours as needed for Pain.  
  
 triamcinolone acetonide 0.1 % ointment Commonly known as:  KENALOG Apply  to affected area two (2) times a day. use thin layer We Performed the Following COMPLETE CBC & AUTO DIFF WBC [63022 CPT(R)] To-Do List   
 10/10/2018 Lab:  CBC WITH 3 PART DIFF Introducing [facility] SERVICES! Dear Nadeem Garcia: Thank you for requesting a Re[employer] account. Our records indicate that you already have an active Repl[employer] account. You can access your account anytime at [email or web address] Did you know that you can access your hospital and ER discharge instructions at any time in Repligen? You can also review all of your test results from your hospital stay or ER visit. Additional Information If you have questions, please visit the Frequently Asked Questions section of the Repligen website at [email or web address] Remember, Repligen is NOT to be used for urgent needs. For medical emergencies, dial 911. Now available from your iPhone and Android! Please provide this summary of care documentation to your next provider. Your primary care clinician is listed as Dariana Whitney. If you have any questions after today's visit, please call [phone].

## 2018-10-10 NOTE — PROGRESS NOTES
Hematology/Oncology Consultation Note Name: Leilani Castaneda Date: 10/10/2018 : 1940 Herlinda Adams NP  
 
 
Ms. Vanda Santa  is a 76 y.o. woman who was referred for evaluation of anemia. Subjective: Chief complaint: Anemia History of present illness: Ms. Vanda Santa is a 70-year-old woman who states that she had been scheduled to have a left total knee replacement on 10/9/2018 unfortunately presurgical lab data revealed that she was quite anemic with a hemoglobin of approximately 6.8 g/dL with hematocrit of 23.3%. WBC was also elevated at 12.7 and a platelet count was elevated at 445,000. The patient denies having any gastrointestinal or genitourinary blood loss. She does ambulate with the use of a walker. She denies having any fainting episodes but is continuing to have a significant degree of fatigue and weakness. At the time that the lab tests were obtained she was not given an option for transfusion. Therefore, she was referred here for an assessment of her anemia. She has no additional complaints except for the pain in her left knee. The patient reports that she has not taking any over-the-counter iron supplements. She has to limit her activity due to the knee pain and due to the weakness most likely associated with her severe anemia. Past Medical History:  
Diagnosis Date  Arrhythmia   
 hx afib  Edema 10/9/2014  
 sec to dependency, chf, mr, knee surgeries & norvasc reduce salt & fluid as no other Sx now  Essential hypertension  GERD (gastroesophageal reflux disease)  Malignant neoplasm of skin 2010  Murmur, cardiac Allergies Allergen Reactions  Ciprofloxacin Nausea Only  Penicillins Rash and Nausea Only  Sulfa (Sulfonamide Antibiotics) Other (comments) Per patient stated made her feel bad Past Surgical History:  
Procedure Laterality Date  HX HYSTERECTOMY  HX KNEE REPLACEMENT    
 HX ORTHOPAEDIC    
 bilateral feet surgery Social History Social History  Marital status: UNKNOWN Spouse name: N/A  
 Number of children: N/A  
 Years of education: N/A Occupational History  Not on file. Social History Main Topics  Smoking status: Never Smoker  Smokeless tobacco: Never Used  Alcohol use No  
 Drug use: No  
 Sexual activity: Not on file Other Topics Concern  Not on file Social History Narrative Family History Problem Relation Age of Onset  Heart Surgery Neg Hx   
 Heart Attack Neg Hx  Stroke Neg Hx Current Outpatient Prescriptions Medication Sig Dispense Refill  ELIQUIS 5 mg tablet  dilTIAZem (TIAZAC) 360 mg ER capsule  mupirocin (BACTROBAN) 2 % ointment  hydroxychloroquine (PLAQUENIL) 200 mg tablet  nitrofurantoin, macrocrystal-monohydrate, (MACROBID) 100 mg capsule  nystatin (MYCOSTATIN) 100,000 unit/mL suspension RINSE WITH 2-5MLS BY MOUTH AND SWALLOW FOUR TIMES A DAY  0  
 lisinopril (PRINIVIL, ZESTRIL) 5 mg tablet Take  by mouth daily.  potassium chloride (K-DUR, KLOR-CON) 10 mEq tablet Take 1 Tab by mouth daily. 30 Tab 1  
 naproxen (NAPROSYN) 500 mg tablet Take 500 mg by mouth daily.  fexofenadine (ALLEGRA) 180 mg tablet Take  by mouth daily.  triamcinolone acetonide (KENALOG) 0.1 % ointment Apply  to affected area two (2) times a day. use thin layer  hydrocodone-acetaminophen (NORCO) 5-325 mg per tablet Take 1 tablet by mouth every four (4) hours as needed for Pain.  docusate sodium (COLACE) 100 mg capsule Take 100 mg by mouth daily.  traMADol (ULTRAM) 50 mg tablet Take 50 mg by mouth every six (6) hours as needed for Pain.  amLODIPine (NORVASC) 10 mg tablet 10 mg. Take 1 Tab by Mouth Once a Day.  aspirin 81 mg tablet 81 Tabs. Take 81 mg by Mouth Once a Day.  gabapentin (NEURONTIN) 300 mg capsule 300 mg. Take 1 Cap by Mouth 3 Times Daily.  esomeprazole (NEXIUM) 40 mg capsule Take 1 Cap by Mouth Once a Day.  pramipexole (MIRAPEX) 0.125 mg tablet Take 1 Tab by Mouth 3 Times Daily.  estradiol (ESTRACE) 1 mg tablet Take 1 mg by Mouth Once a Day. Review of Systems General ROS:The patient has complaints of slowly progressive fatigue and weakness. Psychological ROS: patient denies having any psychological symptoms such as hallucinations, depression or anxiety. Ophthalmic ROS:the patient denies having any visual impairment or eye discomfort. ENT ROS: there are no abnormalities reported. Allergy and Immunology ROS:the patient denies having any seasonal allergies or allergies to medications other than those already outlined above. Hematological and Lymphatic ROS: the patient denies having any bruising, bleeding or lymphadenopathy. Endocrine ROS: the patient denies having any heat or cold intolerance. There is no history of diabetes or thyroid disorders. Breast ROS: the patient denies having any history of breast mass, nipple discharge, or lumps. Respiratory ROS:the patient denies having any cough, shortness of breath, or dyspnea on exertion. Cardiovascular ROS: there are no complaints of chest pain, palpitations, chest pounding, or dyspnea on exertion. Gastrointestinal ROS: the patient denies having nausea, emesis, diarrhea, constipation, or blood in the stool. Genito-Urinary ROS: the patient denies having urinary urgency, frequency, or dysuria. Musculoskeletal ROS: The patient has significant pain and discomfort in the left knee. Tentatively she is hoping to be able to have a knee replacement surgery in the upcoming month. Neurological ROS: the patient denies having any numbness, tingling, or neurologic deficits. Dermatological ROS:patient denies having any unexplained rash, skin ulcerations, or hives. Objective:  
 
Visit Vitals  /64  Pulse (!) 101  Temp 97.1 °F (36.2 °C) (Oral)  Resp 16  
  Ht 5' 4\" (1.626 m)  Wt 80.7 kg (178 lb)  SpO2 100%  BMI 30.55 kg/m2 ECOGPS=0 Physical Exam:  
Gen. Appearance: the patient is in no acute distress. Skin: There is no evidence of bruise or rash. HEENT: The head is normocephalic and atraumatic. The conjunctiva and sclera are clear. Pupils are equal, round, reactive to light, and accommodation. The extraocular movements are intact. ENT reveals no oral mucosal lesions or ulcerations. Neck: Supple without lymphadenopathy or thyromegaly. Lungs: Clear to auscultation and percussion; there are no wheezes or rhonchi. Heart: Regular rate and rhythm; there are no murmurs, gallops, or rubs. Abdomen: Bowel sounds are present and normal.  There is no guarding, tenderness, or hepatosplenomegaly. Extremities: There is no clubbing, cyanosis, or edema. Neurologic: There are no focal neurologic deficits. Lymphatics: There is no palpable peripheral lymphadenopathy. Musculoskeletal: Patient has limited range of motion of the left knee. She is using a walker for mobility support. Lab data: The CBC dated 9/26/2018 showed a WBC count of 12.7, hemoglobin 6.8 g/dL, hematocrit 23.3%, and the platelet count was 825,957 absolute neutrophil count was elevated at 8.9. Urinalysis was negative. Assessment:  
Severe anemia of unclear etiology: She most likely has versus an underlying myelodysplastic syndrome or chronic kidney disease with associated anemia. An additional diagnostic consideration includes the possibility that she has an underlying plasma cell dyscrasia as well. Thrombocytosis and leukocytosis: Patient has both elevated platelet counts and WBCs. This may be a reactive process to severe anemia evolving plasma cell dyscrasia, myeloproliferative disorder, or lymphoproliferative. Plan:  
Severe anemia of unclear etiology:  At this time I will obtain a current CBC, comprehensive metabolic panel, iron profile, ferritin level, erythropoietin level, and serum protein electrophoresis. If her hemoglobin remains below 7 g/dL she will be referred to the hospital for transfusion of 2 units of packed red blood cells. Thrombocytosis and leukocytosis: Flow cytometry will be ordered for further assessment to rule out any evidence that she is developing an underlying myeloproliferative for lymphoproliferative disorder. Follow-up in 2 weeks to review lab data and to discuss potential options of management. Orders Placed This Encounter  COMPLETE CBC & AUTO DIFF WBC  InHouse CBC ("[employer] Standing Status:   Future Standing Expiration Date:   10/17/2018  METABOLIC PANEL, COMPREHENSIVE Standing Status:   Future Standing Expiration Date:   10/11/2019  
 IRON PROFILE Standing Status:   Future Standing Expiration Date:   10/11/2019  FERRITIN Standing Status:   Future Standing Expiration Date:   10/11/2019  IMMUNOPHENOTYPING PROFILE Standing Status:   Future Standing Expiration Date:   10/11/2019 Order Specific Question:   Specimen type Answer:   Blood [2]  SPEP Standing Status:   Future Standing Expiration Date:   10/11/2019  
 ERYTHROPOIETIN Standing Status:   Future Standing Expiration Date:   10/11/2019  ELIQUIS 5 mg tablet  dilTIAZem (TIAZAC) 360 mg ER capsule  mupirocin (BACTROBAN) 2 % ointment  hydroxychloroquine (PLAQUENIL) 200 mg tablet  nitrofurantoin, macrocrystal-monohydrate, (MACROBID) 100 mg capsule  nystatin (MYCOSTATIN) 100,000 unit/mL suspension Sig: RINSE WITH 2-5MLS BY MOUTH AND SWALLOW FOUR TIMES A DAY Refill:  0  
 
 
 
 
Juliette Gudino MD 
10/10/2018 Please note: This document has been produced using voice recognition software. Unrecognized errors in transcription may be present.

## 2018-10-11 LAB
ALBUMIN SERPL-MCNC: 3.2 G/DL (ref 3.4–5)
ALBUMIN/GLOB SERPL: 0.8 {RATIO} (ref 0.8–1.7)
ALP SERPL-CCNC: 150 U/L (ref 45–117)
ALT SERPL-CCNC: 18 U/L (ref 13–56)
ANION GAP SERPL CALC-SCNC: 6 MMOL/L (ref 3–18)
AST SERPL-CCNC: 16 U/L (ref 15–37)
BILIRUB SERPL-MCNC: 0.3 MG/DL (ref 0.2–1)
BUN SERPL-MCNC: 17 MG/DL (ref 7–18)
BUN/CREAT SERPL: 13 (ref 12–20)
CALCIUM SERPL-MCNC: 8.6 MG/DL (ref 8.5–10.1)
CHLORIDE SERPL-SCNC: 108 MMOL/L (ref 100–108)
CO2 SERPL-SCNC: 29 MMOL/L (ref 21–32)
CREAT SERPL-MCNC: 1.33 MG/DL (ref 0.6–1.3)
EPO SERPL-ACNC: 72.9 MIU/ML (ref 2.6–18.5)
FERRITIN SERPL-MCNC: 7 NG/ML (ref 8–388)
GLOBULIN SER CALC-MCNC: 3.8 G/DL (ref 2–4)
GLUCOSE SERPL-MCNC: 99 MG/DL (ref 74–99)
IRON SATN MFR SERPL: 3 %
IRON SERPL-MCNC: 13 UG/DL (ref 50–175)
POTASSIUM SERPL-SCNC: 4.5 MMOL/L (ref 3.5–5.5)
PROT SERPL-MCNC: 7 G/DL (ref 6.4–8.2)
SODIUM SERPL-SCNC: 143 MMOL/L (ref 136–145)
TIBC SERPL-MCNC: 376 UG/DL (ref 250–450)

## 2018-10-13 LAB
ALBUMIN SERPL ELPH-MCNC: 3.2 G/DL (ref 2.9–4.4)
ALBUMIN/GLOB SERPL: 0.9 {RATIO} (ref 0.7–1.7)
ALPHA1 GLOB SERPL ELPH-MCNC: 0.3 G/DL (ref 0–0.4)
ALPHA2 GLOB SERPL ELPH-MCNC: 0.9 G/DL (ref 0.4–1)
B-GLOBULIN SERPL ELPH-MCNC: 1.2 G/DL (ref 0.7–1.3)
GAMMA GLOB SERPL ELPH-MCNC: 1.1 G/DL (ref 0.4–1.8)
GLOBULIN SER CALC-MCNC: 3.5 G/DL (ref 2.2–3.9)
M PROTEIN SERPL ELPH-MCNC: NORMAL G/DL
PROT SERPL-MCNC: 6.7 G/DL (ref 6–8.5)

## 2018-10-24 ENCOUNTER — OFFICE VISIT (OUTPATIENT)
Dept: ONCOLOGY | Age: 78
End: 2018-10-24

## 2018-10-24 VITALS
HEIGHT: 64 IN | OXYGEN SATURATION: 95 % | HEART RATE: 108 BPM | DIASTOLIC BLOOD PRESSURE: 74 MMHG | TEMPERATURE: 96.8 F | SYSTOLIC BLOOD PRESSURE: 143 MMHG | RESPIRATION RATE: 16 BRPM | WEIGHT: 183 LBS | BODY MASS INDEX: 31.24 KG/M2

## 2018-10-24 DIAGNOSIS — D75.839 THROMBOCYTOSIS: ICD-10-CM

## 2018-10-24 DIAGNOSIS — D72.9 NEUTROPHILIC LEUKOCYTOSIS: ICD-10-CM

## 2018-10-24 DIAGNOSIS — D50.8 IRON DEFICIENCY ANEMIA SECONDARY TO INADEQUATE DIETARY IRON INTAKE: Primary | ICD-10-CM

## 2018-10-24 NOTE — PATIENT INSTRUCTIONS
Iron Deficiency Anemia: Care Instructions  Your Care Instructions    Anemia means that you do not have enough red blood cells. Red blood cells carry oxygen around your body. When you have anemia, it can make you pale, weak, and tired. Many things can cause anemia. The most common cause is loss of blood. This can happen if you have heavy menstrual periods. It can also happen if you have bleeding in your stomach or bowel. You can also get anemia if you don't have enough iron in your diet or if it's hard for your body to absorb iron. In some cases, pregnancy causes anemia. That's because a pregnant woman needs more iron. Your doctor may do more tests to find the cause of your anemia. If a disease or other health problem is causing it, your doctor will treat that problem. It's important to follow up with your doctor to make sure that your iron level returns to normal.  Follow-up care is a key part of your treatment and safety. Be sure to make and go to all appointments, and call your doctor if you are having problems. It's also a good idea to know your test results and keep a list of the medicines you take. How can you care for yourself at home? · If your doctor recommended iron pills, take them as directed. ? Try to take the pills on an empty stomach. You can do this about 1 hour before or 2 hours after meals. But you may need to take iron with food to avoid an upset stomach. ? Do not take antacids or drink milk or anything with caffeine within 2 hours of when you take your iron. They can keep your body from absorbing the iron well. ? Vitamin C helps your body absorb iron. You may want to take iron pills with a glass of orange juice or some other food high in vitamin C.  ? Iron pills may cause stomach problems. These include heartburn, nausea, diarrhea, constipation, and cramps. It can help to drink plenty of fluids and include fruits, vegetables, and fiber in your diet.   ? It's normal for iron pills to make your stool a greenish or grayish black. But internal bleeding can also cause dark stool. So it's important to tell your doctor about any color changes. ? Call your doctor if you think you are having a problem with your iron pills. Even after you start to feel better, it will take several months for your body to build up its supply of iron. ? If you miss a pill, don't take a double dose. ? Keep iron pills out of the reach of small children. Too much iron can be very dangerous. · Eat foods with a lot of iron. These include red meat, shellfish, poultry, and eggs. They also include beans, raisins, whole-grain bread, and leafy green vegetables. · Steam your vegetables. This is the best way to prepare them if you want to get as much iron as possible. · Be safe with medicines. Do not take nonsteroidal anti-inflammatory pain relievers unless your doctor tells you to. These include aspirin, naproxen (Aleve), and ibuprofen (Advil, Motrin). · Liquid iron can stain your teeth. But you can mix it with water or juice and drink it with a straw. Then it won't get on your teeth. When should you call for help? Call 911 anytime you think you may need emergency care. For example, call if:    · You passed out (lost consciousness).    Call your doctor now or seek immediate medical care if:    · You are short of breath.     · You are dizzy or light-headed, or you feel like you may faint.     · You have new or worse bleeding.    Watch closely for changes in your health, and be sure to contact your doctor if:    · You feel weaker or more tired than usual.     · You do not get better as expected. Where can you learn more? Go to http://shawn-stevie.info/. Enter D135 in the search box to learn more about \"Iron Deficiency Anemia: Care Instructions. \"  Current as of: May 7, 2018  Content Version: 11.8  © 6922-3223 [employer].  Care instructions adapted under license by [employer] (which disclaims liability or warranty for this information). If you have questions about a medical condition or this instruction, always ask your healthcare professional. [address] any warranty or liability for your use of this information.

## 2018-12-19 ENCOUNTER — OFFICE VISIT (OUTPATIENT)
Dept: ONCOLOGY | Age: 78
End: 2018-12-19

## 2018-12-19 ENCOUNTER — HOSPITAL ENCOUNTER (OUTPATIENT)
Dept: ONCOLOGY | Age: 78
Discharge: HOME | End: 2018-12-19

## 2018-12-19 ENCOUNTER — HOSPITAL ENCOUNTER (OUTPATIENT)
Dept: LAB | Age: 78
Discharge: HOME | End: 2018-12-19
Payer: MEDICARE

## 2018-12-19 VITALS
DIASTOLIC BLOOD PRESSURE: 79 MMHG | SYSTOLIC BLOOD PRESSURE: 139 MMHG | BODY MASS INDEX: 29.71 KG/M2 | WEIGHT: 174 LBS | TEMPERATURE: 96.7 F | RESPIRATION RATE: 18 BRPM | OXYGEN SATURATION: 94 % | HEART RATE: 95 BPM | HEIGHT: 64 IN

## 2018-12-19 DIAGNOSIS — D75.839 THROMBOCYTOSIS: ICD-10-CM

## 2018-12-19 DIAGNOSIS — D50.8 IRON DEFICIENCY ANEMIA SECONDARY TO INADEQUATE DIETARY IRON INTAKE: ICD-10-CM

## 2018-12-19 DIAGNOSIS — D50.8 IRON DEFICIENCY ANEMIA SECONDARY TO INADEQUATE DIETARY IRON INTAKE: Primary | ICD-10-CM

## 2018-12-19 DIAGNOSIS — D72.9 NEUTROPHILIC LEUKOCYTOSIS: ICD-10-CM

## 2018-12-19 LAB
ALBUMIN SERPL-MCNC: 2.9 G/DL (ref 3.4–5)
ALBUMIN/GLOB SERPL: 0.6 {RATIO} (ref 0.8–1.7)
ALP SERPL-CCNC: 153 U/L (ref 45–117)
ALT SERPL-CCNC: 18 U/L (ref 13–56)
ANION GAP SERPL CALC-SCNC: 4 MMOL/L (ref 3–18)
AST SERPL-CCNC: 24 U/L (ref 15–37)
BASO+EOS+MONOS # BLD AUTO: 1.3 K/UL (ref 0–2.3)
BASO+EOS+MONOS # BLD AUTO: 14 % (ref 0.1–17)
BILIRUB SERPL-MCNC: 0.4 MG/DL (ref 0.2–1)
BUN SERPL-MCNC: 12 MG/DL (ref 7–18)
BUN/CREAT SERPL: 11 (ref 12–20)
CALCIUM SERPL-MCNC: 8.6 MG/DL (ref 8.5–10.1)
CHLORIDE SERPL-SCNC: 104 MMOL/L (ref 100–108)
CO2 SERPL-SCNC: 33 MMOL/L (ref 21–32)
CREAT SERPL-MCNC: 1.11 MG/DL (ref 0.6–1.3)
DIFFERENTIAL METHOD BLD: ABNORMAL
ERYTHROCYTE [DISTWIDTH] IN BLOOD BY AUTOMATED COUNT: 20.3 % (ref 11.5–14.5)
FERRITIN SERPL-MCNC: 577 NG/ML (ref 8–388)
GLOBULIN SER CALC-MCNC: 4.5 G/DL (ref 2–4)
GLUCOSE SERPL-MCNC: 90 MG/DL (ref 74–99)
HCT VFR BLD AUTO: 36 % (ref 36–48)
HGB BLD-MCNC: 11.2 G/DL (ref 12–16)
IRON SATN MFR SERPL: 23 %
IRON SERPL-MCNC: 50 UG/DL (ref 50–175)
LYMPHOCYTES # BLD: 1.4 K/UL (ref 1.1–5.9)
LYMPHOCYTES NFR BLD: 15 % (ref 14–44)
MCH RBC QN AUTO: 27.7 PG (ref 25–35)
MCHC RBC AUTO-ENTMCNC: 31.1 G/DL (ref 31–37)
MCV RBC AUTO: 89.1 FL (ref 78–102)
NEUTS SEG # BLD: 6.6 K/UL (ref 1.8–9.5)
NEUTS SEG NFR BLD: 71 % (ref 40–70)
PLATELET # BLD AUTO: 474 K/UL (ref 140–440)
POTASSIUM SERPL-SCNC: 4.2 MMOL/L (ref 3.5–5.5)
PROT SERPL-MCNC: 7.4 G/DL (ref 6.4–8.2)
RBC # BLD AUTO: 4.04 M/UL (ref 4.1–5.1)
SODIUM SERPL-SCNC: 141 MMOL/L (ref 136–145)
TIBC SERPL-MCNC: 222 UG/DL (ref 250–450)
WBC # BLD AUTO: 9.3 K/UL (ref 4.5–13)

## 2018-12-19 PROCEDURE — 83540 ASSAY OF IRON: CPT

## 2018-12-19 PROCEDURE — 80053 COMPREHEN METABOLIC PANEL: CPT

## 2018-12-19 PROCEDURE — 82728 ASSAY OF FERRITIN: CPT

## 2018-12-19 NOTE — PROGRESS NOTES
Hematology/Oncology  Progress Note    Name: Rona Ceja  Date: 2018  : 1940     Primary Care Provider: Casa Griffin NP      Ms. Emily Sahu is a [de-identified]y.o. year old female with severe iron deficiency anemia and anemia of chronic kidney disease     Current Therapy: Venofor as needed (completed on 2018)    Subjective:        Mrs. Emily Sahu is an 29-year-old woman who was referred for evaluation of severe anemia. She was referred to the hospital for transfusion of 2 units of packed red blood cells. She was found to have chronic kidney disease which is contributing to her anemia. Based on these data the patient has anemia associated with both iron deficiency and chronic kidney disease. Intravenous iron therapy with Venofer at a dose of 250 mg every 2 weeks for 4 doses was recommended and completed on 2018. She is in the office today for follow up. She report that she feels much better and she feels like her energy is getting better and fatigue as well. She is in the office with her son today. The past medical, surgical and social history has been reviewed and remains unchanged.    Past Medical History:   Diagnosis Date    Arrhythmia     hx afib    Edema 10/9/2014    sec to dependency, chf, mr, knee surgeries & norvasc reduce salt & fluid as no other Sx now     Essential hypertension     GERD (gastroesophageal reflux disease)     Malignant neoplasm of skin 2010    Murmur, cardiac      Past Surgical History:   Procedure Laterality Date    HX HYSTERECTOMY      HX KNEE REPLACEMENT      HX ORTHOPAEDIC      bilateral feet surgery     Social History     Socioeconomic History    Marital status:      Spouse name: Not on file    Number of children: Not on file    Years of education: Not on file    Highest education level: Not on file   Social Needs    Financial resource strain: Not on file    Food insecurity - worry: Not on file    Food insecurity - inability: Not on file   Felisa Short Transportation needs - medical: Not on file   [employer] needs - non-medical: Not on file   Occupational History    Not on file   Tobacco Use    Smoking status: Never Smoker    Smokeless tobacco: Never Used   Substance and Sexual Activity    Alcohol use: No    Drug use: No    Sexual activity: Not on file   Other Topics Concern    Not on file   Social History Narrative    Not on file     Family History   Problem Relation Age of Onset    Heart Surgery Neg Hx     Heart Attack Neg Hx     Stroke Neg Hx      Current Outpatient Medications   Medication Sig Dispense Refill    ELIQUIS 5 mg tablet       dilTIAZem (TIAZAC) 360 mg ER capsule       mupirocin (BACTROBAN) 2 % ointment       hydroxychloroquine (PLAQUENIL) 200 mg tablet       nitrofurantoin, macrocrystal-monohydrate, (MACROBID) 100 mg capsule       nystatin (MYCOSTATIN) 100,000 unit/mL suspension RINSE WITH 2-5MLS BY MOUTH AND SWALLOW FOUR TIMES A DAY  0    lisinopril (PRINIVIL, ZESTRIL) 5 mg tablet Take  by mouth daily.  potassium chloride (K-DUR, KLOR-CON) 10 mEq tablet Take 1 Tab by mouth daily. 30 Tab 1    naproxen (NAPROSYN) 500 mg tablet Take 500 mg by mouth daily.  fexofenadine (ALLEGRA) 180 mg tablet Take  by mouth daily.  triamcinolone acetonide (KENALOG) 0.1 % ointment Apply  to affected area two (2) times a day. use thin layer      hydrocodone-acetaminophen (NORCO) 5-325 mg per tablet Take 1 tablet by mouth every four (4) hours as needed for Pain.  docusate sodium (COLACE) 100 mg capsule Take 100 mg by mouth daily.  traMADol (ULTRAM) 50 mg tablet Take 50 mg by mouth every six (6) hours as needed for Pain.  amLODIPine (NORVASC) 10 mg tablet 10 mg. Take 1 Tab by Mouth Once a Day.  aspirin 81 mg tablet 81 Tabs. Take 81 mg by Mouth Once a Day.  gabapentin (NEURONTIN) 300 mg capsule 300 mg. Take 1 Cap by Mouth 3 Times Daily.       esomeprazole (NEXIUM) 40 mg capsule Take 1 Cap by Mouth Once a Day.      pramipexole (MIRAPEX) 0.125 mg tablet Take 1 Tab by Mouth 3 Times Daily.  estradiol (ESTRACE) 1 mg tablet Take 1 mg by Mouth Once a Day. Review of Systems  General :The patient has no complaints and there is no physical distress evident. Psychological : patient denies having any psychological symptoms such as hallucinations depression or anxiety. Ophthalmic:the patient denies having any visual impairment or eye discomfort. ENT: there are no abnormalities reported. Allergy and Immunology:the patient denies having any seasonal allergies or allergies to medications other than those already outlined above. Hematological and Lymphatic: the patient denies having any bruising, bleeding or lymphadenopathy. Endocrine: the patient denies having any heat or cold intolerance. There is no history of diabetes or thyroid disorders. Breast: the patient denies having any history of breast mass or lumps. Respiratory:the patient denies having any cough, shortness of breath, or dyspnea on exertion. Cardiovascular: there are no complaints of chest pain, palpitations, chest pounding, or dyspnea on exertion. Gastrointestinal: the patient denies having nausea, emesis, diarrhea, constipation, or blood in the stool. Genito-Urinary: the patient denies having urinary urgency, frequency, or dysuria. Musculoskeletal: with the exception of mild arthralgias the patient has no other musculoskeletal complaints. Neurological:  denies having any numbness, tingling, or neurologic deficits. Dermatological: patient denies having any unexplained rash, skin ulcerations, or hives.     Objective:     Visit Vitals  /79   Pulse 95   Temp 96.7 °F (35.9 °C) (Oral)   Resp 18   Ht 5' 4\" (1.626 m)   Wt 78.9 kg (174 lb)   SpO2 94%   BMI 29.87 kg/m²     Pain Score: 0    Physical Exam:   ECO  General: Well appearing, in NAD  Skin: examination of the skin reveals no bruising, rash or petechiae  HEENT: Normocephalic, atraumatic. Conjunctiva and sclera are clear. Pupils are equal, round and reactive to light. EOMs are intact. ENT without oral mucosal lesions, stomatitis or thrush  Neck: supple without lymphadenopathy, JVD or thyromegaly  Lymphatics: no palpable cervical, supraclavicular, axillary or inguinal lymphadenopathy  Lungs: clear breath sounds bilaterally, no rhonchi or wheezes noted  Heart: Regular rate and rhythm, no murmurs, rubs or gallops, S1-S2 noted. Positive peripheral pulses bilaterally upper and lower extremities  Abdomen: soft, non-tender, non-distended, no HSM, positive bowel sounds  Extremities: without clubbing, cyanosis or edema  Neurologic: no focal deficits, she is using a walker for support and mobility, Alert and oriented x 3. Psychologic: mood and affect are appropriate, no anxiety or depression noted    Laboratory Data:     Results for orders placed or performed during the hospital encounter of 12/19/18   CBC WITH 3 PART DIFF     Status: Abnormal   Result Value Ref Range Status    WBC 9.3 4.5 - 13.0 K/uL Final    RBC 4.04 (L) 4.10 - 5.10 M/uL Final    HGB 11.2 (L) 12.0 - 16 g/dL Final    HCT 36.0 36 - 48 % Final    MCV 89.1 78 - 102 FL Final    MCH 27.7 25.0 - 35.0 PG Final    MCHC 31.1 31 - 37 g/dL Final    RDW 20.3 (H) 11.5 - 14.5 % Final    PLATELET 875 (H) 631 - 440 K/uL Final    NEUTROPHILS 71 (H) 40 - 70 % Final    MIXED CELLS 14 0.1 - 17 % Final    LYMPHOCYTES 15 14 - 44 % Final    ABS. NEUTROPHILS 6.6 1.8 - 9.5 K/UL Final    ABS. MIXED CELLS 1.3 0.0 - 2.3 K/uL Final    ABS. LYMPHOCYTES 1.4 1.1 - 5.9 K/UL Final     Comment: Test performed at [place] [id number] Location. Results Reviewed by Medical Director.     DF AUTOMATED   Final            Patient Active Problem List   Diagnosis Code    Nonspecific abnormal electrocardiogram (ECG) (EKG) R94.31    Essential hypertension, benign I10    Pre-operative cardiovascular examination Z01.810    Undiagnosed cardiac murmurs R01.1    Mitral valve disorders(424.0) I05.9    Edema R60.9    Nausea and vomiting in adult R11.2    Localized osteoarthrosis not specified whether primary or secondary, lower leg M17.10    Hyperkalemia E87.5    GERD (gastroesophageal reflux disease) K21.9    DJD (degenerative joint disease) M19.90    Disorder of bone and cartilage M89.9, J64.6    Diastolic dysfunction with chronic heart failure (HCC) I50.32    Community acquired pneumonia of left lower lobe of lung (HCC) J18.1    Chest tightness R07.89    Chest pain R07.9    CAP (community acquired pneumonia) J18.9    A-fib (HCC) I48.91    Abdominal pain R10.9    Paroxysmal atrial fibrillation ([facility]) I48.0    Postoperative anemia due to acute blood loss D62    RLS (restless legs syndrome) G25.81    Sepsis (HCC) A41.9    Malignant neoplasm of skin C44.90    Iron deficiency anemia secondary to inadequate dietary iron intake D50.8    Thrombocytosis (HCC) R37.6    Neutrophilic leukocytosis K00.3         Assessment:     1. Iron deficiency anemia secondary to inadequate dietary iron intake      Plan:   Iron deficiency Anemia/anemia of chronic kidney disease: I have explained to patient that her WBC for today is normal at 9.3, Hemoglobin is 11.2 g/dl and hematocrit is 36.0 g/dl, PLT is 474,000. She did completed 4 doses of Venofer on 12/11/2018 and that seems to have help significantly. I have inform patient that she will be treated with Procrit at a dose of 60,000 units every 2-4 weeks whenever the hemoglobin is below 10 g/dL and hematocrit is below 30%. Today her H&H is 11.2 and 36.0. Follow up in 8 weeks to see how well she is doing and if she needs to be started on the Procrit. Follow up in 8 weeks.        Follow-up Disposition: Not on File   Orders Placed This Encounter    COMPLETE CBC & AUTO DIFF WBC    InHouse CBC ([employer])     Standing Status:   Future     Number of Occurrences:   1     Standing Expiration Date:   12/26/2018   [facility] FERRITIN     Standing Status:   Future     Standing Expiration Date:   12/20/2019    IRON PROFILE     Standing Status:   Future     Standing Expiration Date:   04/72/0726    METABOLIC PANEL, COMPREHENSIVE     Standing Status:   Future     Standing Expiration Date:   12/20/2019       Veronica Brar NP  12/19/2018     I have assessed the patient independently and  agree with the full assessment as outlined.   Dion Marie MD, [address]

## 2019-02-13 ENCOUNTER — HOSPITAL ENCOUNTER (OUTPATIENT)
Dept: LAB | Age: 79
Discharge: HOME | End: 2019-02-13
Payer: MEDICARE

## 2019-02-13 ENCOUNTER — HOSPITAL ENCOUNTER (OUTPATIENT)
Dept: ONCOLOGY | Age: 79
Discharge: HOME | End: 2019-02-13

## 2019-02-13 ENCOUNTER — OFFICE VISIT (OUTPATIENT)
Dept: ONCOLOGY | Age: 79
End: 2019-02-13

## 2019-02-13 VITALS
BODY MASS INDEX: 29.19 KG/M2 | HEART RATE: 103 BPM | DIASTOLIC BLOOD PRESSURE: 79 MMHG | OXYGEN SATURATION: 100 % | RESPIRATION RATE: 16 BRPM | HEIGHT: 64 IN | SYSTOLIC BLOOD PRESSURE: 140 MMHG | TEMPERATURE: 97.2 F | WEIGHT: 171 LBS

## 2019-02-13 DIAGNOSIS — D50.8 IRON DEFICIENCY ANEMIA SECONDARY TO INADEQUATE DIETARY IRON INTAKE: ICD-10-CM

## 2019-02-13 DIAGNOSIS — D50.8 IRON DEFICIENCY ANEMIA SECONDARY TO INADEQUATE DIETARY IRON INTAKE: Primary | ICD-10-CM

## 2019-02-13 LAB
ALBUMIN SERPL-MCNC: 3 G/DL (ref 3.4–5)
ALBUMIN/GLOB SERPL: 0.7 {RATIO} (ref 0.8–1.7)
ALP SERPL-CCNC: 201 U/L (ref 45–117)
ALT SERPL-CCNC: 15 U/L (ref 13–56)
ANION GAP SERPL CALC-SCNC: 9 MMOL/L (ref 3–18)
AST SERPL-CCNC: 18 U/L (ref 15–37)
BASO+EOS+MONOS # BLD AUTO: 0.9 K/UL (ref 0–2.3)
BASO+EOS+MONOS NFR BLD AUTO: 10 % (ref 0.1–17)
BILIRUB SERPL-MCNC: 0.4 MG/DL (ref 0.2–1)
BUN SERPL-MCNC: 9 MG/DL (ref 7–18)
BUN/CREAT SERPL: 8 (ref 12–20)
CALCIUM SERPL-MCNC: 9.1 MG/DL (ref 8.5–10.1)
CHLORIDE SERPL-SCNC: 105 MMOL/L (ref 100–108)
CO2 SERPL-SCNC: 30 MMOL/L (ref 21–32)
CREAT SERPL-MCNC: 1.14 MG/DL (ref 0.6–1.3)
DIFFERENTIAL METHOD BLD: ABNORMAL
ERYTHROCYTE [DISTWIDTH] IN BLOOD BY AUTOMATED COUNT: 16.1 % (ref 11.5–14.5)
FERRITIN SERPL-MCNC: 209 NG/ML (ref 8–388)
GLOBULIN SER CALC-MCNC: 4.4 G/DL (ref 2–4)
GLUCOSE SERPL-MCNC: 102 MG/DL (ref 74–99)
HCT VFR BLD AUTO: 38.4 % (ref 36–48)
HGB BLD-MCNC: 12.2 G/DL (ref 12–16)
IRON SATN MFR SERPL: 29 %
IRON SERPL-MCNC: 66 UG/DL (ref 50–175)
LYMPHOCYTES # BLD: 2 K/UL (ref 1.1–5.9)
LYMPHOCYTES NFR BLD: 25 % (ref 14–44)
MCH RBC QN AUTO: 30.2 PG (ref 25–35)
MCHC RBC AUTO-ENTMCNC: 31.8 G/DL (ref 31–37)
MCV RBC AUTO: 95 FL (ref 78–102)
NEUTS SEG # BLD: 5.3 K/UL (ref 1.8–9.5)
NEUTS SEG NFR BLD: 65 % (ref 40–70)
PLATELET # BLD AUTO: 334 K/UL (ref 140–440)
POTASSIUM SERPL-SCNC: 3.5 MMOL/L (ref 3.5–5.5)
PROT SERPL-MCNC: 7.4 G/DL (ref 6.4–8.2)
RBC # BLD AUTO: 4.04 M/UL (ref 4.1–5.1)
SODIUM SERPL-SCNC: 144 MMOL/L (ref 136–145)
TIBC SERPL-MCNC: 226 UG/DL (ref 250–450)
WBC # BLD AUTO: 8.2 K/UL (ref 4.5–13)

## 2019-02-13 PROCEDURE — 83540 ASSAY OF IRON: CPT

## 2019-02-13 PROCEDURE — 36415 COLL VENOUS BLD VENIPUNCTURE: CPT

## 2019-02-13 PROCEDURE — 80053 COMPREHEN METABOLIC PANEL: CPT

## 2019-02-13 PROCEDURE — 82728 ASSAY OF FERRITIN: CPT

## 2019-02-13 NOTE — PROGRESS NOTES
Hematology/Oncology  Progress Note    Name: Chelsea Terrazas  Date: 2019  : 1940     Primary Care Provider: Bonnie Miles NP      Ms. Adán Bruce is a [de-identified]y.o. year old female with severe iron deficiency anemia and anemia of chronic kidney disease     Current Therapy: Venofor as needed (completed on 2018)    Subjective:        Mrs. Adán Bruce is a 24-year-old woman who was referred for evaluation of severe anemia. She was referred to the hospital for transfusion of 2 units of packed red blood cells. She was found to have chronic kidney disease which is contributing to her anemia. Based on these data the patient has anemia associated with both iron deficiency and chronic kidney disease. Intravenous iron therapy with Venofer at a dose of 250 mg every 2 weeks for 4 doses was recommended and completed on 2018. She is in the office today for follow up. She report that she feels much better and she feels like her energy is getting better and fatigue as well. She is in the office with her son today. The past medical, surgical and social history has been reviewed and remains unchanged.    Past Medical History:   Diagnosis Date    Arrhythmia     hx afib    Edema 10/9/2014    sec to dependency, chf, mr, knee surgeries & norvasc reduce salt & fluid as no other Sx now     Essential hypertension     GERD (gastroesophageal reflux disease)     Malignant neoplasm of skin 2010    Murmur, cardiac      Past Surgical History:   Procedure Laterality Date    HX HYSTERECTOMY      HX KNEE REPLACEMENT      HX ORTHOPAEDIC      bilateral feet surgery     Social History     Socioeconomic History    Marital status:      Spouse name: Not on file    Number of children: Not on file    Years of education: Not on file    Highest education level: Not on file   Social Needs    Financial resource strain: Not on file    Food insecurity - worry: Not on file    Food insecurity - inability: Not on file   [facility] Transportation needs - medical: Not on file   [employer] needs - non-medical: Not on file   Occupational History    Not on file   Tobacco Use    Smoking status: Never Smoker    Smokeless tobacco: Never Used   Substance and Sexual Activity    Alcohol use: No    Drug use: No    Sexual activity: Not on file   Other Topics Concern    Not on file   Social History Narrative    Not on file     Family History   Problem Relation Age of Onset    Heart Surgery Neg Hx     Heart Attack Neg Hx     Stroke Neg Hx      Current Outpatient Medications   Medication Sig Dispense Refill    ELIQUIS 5 mg tablet       dilTIAZem (TIAZAC) 360 mg ER capsule       mupirocin (BACTROBAN) 2 % ointment       hydroxychloroquine (PLAQUENIL) 200 mg tablet       nitrofurantoin, macrocrystal-monohydrate, (MACROBID) 100 mg capsule       nystatin (MYCOSTATIN) 100,000 unit/mL suspension RINSE WITH 2-5MLS BY MOUTH AND SWALLOW FOUR TIMES A DAY  0    lisinopril (PRINIVIL, ZESTRIL) 5 mg tablet Take  by mouth daily.  potassium chloride (K-DUR, KLOR-CON) 10 mEq tablet Take 1 Tab by mouth daily. 30 Tab 1    naproxen (NAPROSYN) 500 mg tablet Take 500 mg by mouth daily.  fexofenadine (ALLEGRA) 180 mg tablet Take  by mouth daily.  triamcinolone acetonide (KENALOG) 0.1 % ointment Apply  to affected area two (2) times a day. use thin layer      hydrocodone-acetaminophen (NORCO) 5-325 mg per tablet Take 1 tablet by mouth every four (4) hours as needed for Pain.  docusate sodium (COLACE) 100 mg capsule Take 100 mg by mouth daily.  traMADol (ULTRAM) 50 mg tablet Take 50 mg by mouth every six (6) hours as needed for Pain.  amLODIPine (NORVASC) 10 mg tablet 10 mg. Take 1 Tab by Mouth Once a Day.  aspirin 81 mg tablet 81 Tabs. Take 81 mg by Mouth Once a Day.  gabapentin (NEURONTIN) 300 mg capsule 300 mg. Take 1 Cap by Mouth 3 Times Daily.       esomeprazole (NEXIUM) 40 mg capsule Take 1 Cap by Mouth Once a Day.      pramipexole (MIRAPEX) 0.125 mg tablet Take 1 Tab by Mouth 3 Times Daily.  estradiol (ESTRACE) 1 mg tablet Take 1 mg by Mouth Once a Day. Review of Systems  General :The patient has no complaints and there is no physical distress evident. Psychological : patient denies having any psychological symptoms such as hallucinations depression or anxiety. Ophthalmic:the patient denies having any visual impairment or eye discomfort. ENT: there are no abnormalities reported. Allergy and Immunology:the patient denies having any seasonal allergies or allergies to medications other than those already outlined above. Hematological and Lymphatic: the patient denies having any bruising, bleeding or lymphadenopathy. Endocrine: the patient denies having any heat or cold intolerance. There is no history of diabetes or thyroid disorders. Breast: the patient denies having any history of breast mass or lumps. Respiratory:the patient denies having any cough, shortness of breath, or dyspnea on exertion. Cardiovascular: there are no complaints of chest pain, palpitations, chest pounding, or dyspnea on exertion. Gastrointestinal: the patient denies having nausea, emesis, diarrhea, constipation, or blood in the stool. Genito-Urinary: the patient denies having urinary urgency, frequency, or dysuria. Musculoskeletal: with the exception of mild arthralgias the patient has no other musculoskeletal complaints. Neurological:  denies having any numbness, tingling, or neurologic deficits. Dermatological: patient denies having any unexplained rash, skin ulcerations, or hives.     Objective:     Visit Vitals  /79   Pulse (!) 103   Temp 97.2 °F (36.2 °C) (Oral)   Resp 16   Ht 5' 4\" (1.626 m)   Wt 77.6 kg (171 lb)   SpO2 100%   BMI 29.35 kg/m²     Pain Score: 0    Physical Exam:   ECO  General: Well appearing, in NAD  Skin: examination of the skin reveals no bruising, rash or petechiae  HEENT: Normocephalic, atraumatic. Conjunctiva and sclera are clear. Pupils are equal, round and reactive to light. EOMs are intact. ENT without oral mucosal lesions, stomatitis or thrush  Neck: supple without lymphadenopathy, JVD or thyromegaly  Lymphatics: no palpable cervical, supraclavicular, axillary or inguinal lymphadenopathy  Lungs: clear breath sounds bilaterally, no rhonchi or wheezes noted  Heart: Regular rate and rhythm, no murmurs, rubs or gallops, S1-S2 noted. Positive peripheral pulses bilaterally upper and lower extremities  Abdomen: soft, non-tender, non-distended, no HSM, positive bowel sounds  Extremities: without clubbing, cyanosis or edema  Neurologic: no focal deficits, she is using a walker for support and mobility, Alert and oriented x 3. Psychologic: mood and affect are appropriate, no anxiety or depression noted    Laboratory Data:     Results for orders placed or performed during the hospital encounter of 02/13/19   CBC WITH 3 PART DIFF     Status: Abnormal   Result Value Ref Range Status    WBC 8.2 4.5 - 13.0 K/uL Final    RBC 4.04 (L) 4.10 - 5.10 M/uL Final    HGB 12.2 12.0 - 16 g/dL Final    HCT 38.4 36 - 48 % Final    MCV 95.0 78 - 102 FL Final    MCH 30.2 25.0 - 35.0 PG Final    MCHC 31.8 31 - 37 g/dL Final    RDW 16.1 (H) 11.5 - 14.5 % Final    PLATELET 935 975 - 323 K/uL Final    NEUTROPHILS 65 40 - 70 % Final    MIXED CELLS 10 0.1 - 17 % Final    LYMPHOCYTES 25 14 - 44 % Final    ABS. NEUTROPHILS 5.3 1.8 - 9.5 K/UL Final    ABS. MIXED CELLS 0.9 0.0 - 2.3 K/uL Final    ABS. LYMPHOCYTES 2.0 1.1 - 5.9 K/UL Final     Comment: Test performed at [place] [address] Location. Results Reviewed by Medical Director.     DF AUTOMATED   Final            Patient Active Problem List   Diagnosis Code    Nonspecific abnormal electrocardiogram (ECG) (EKG) R94.31    Essential hypertension, benign I10    Pre-operative cardiovascular examination Z01.810    Undiagnosed cardiac murmurs R01.1    Mitral valve disorders(424.0) I05.9    Edema R60.9    Nausea and vomiting in adult R11.2    Localized osteoarthrosis not specified whether primary or secondary, lower leg M17.10    Hyperkalemia E87.5    GERD (gastroesophageal reflux disease) K21.9    DJD (degenerative joint disease) M19.90    Disorder of bone and cartilage M89.9, A06.6    Diastolic dysfunction with chronic heart failure (HCC) I50.32    Community acquired pneumonia of left lower lobe of lung (HCC) J18.1    Chest tightness R07.89    Chest pain R07.9    CAP (community acquired pneumonia) J18.9    A-fib (HCC) I48.91    Abdominal pain R10.9    Paroxysmal atrial fibrillation (HCC) I48.0    Postoperative anemia due to acute blood loss D62    RLS (restless legs syndrome) G25.81    Sepsis (HCC) A41.9    Malignant neoplasm of skin C44.90    Iron deficiency anemia secondary to inadequate dietary iron intake D50.8    Thrombocytosis (HCC) H55.3    Neutrophilic leukocytosis Z63.3         Assessment:     1. Iron deficiency anemia secondary to inadequate dietary iron intake      Plan:   Iron deficiency Anemia/anemia of chronic kidney disease: I have explained to patient that her WBC for today is normal at 8.2, Hemoglobin is 12.2 g/dl and hematocrit is 38.4 g/dl, PLT is 334,000. She did completed 4 doses of Venofer on 12/11/2018 and that seems to have help significantly. I have inform patient that she will be treated with Procrit at a dose of 60,000 units every 2-4 weeks whenever the hemoglobin is below 10 g/dL and hematocrit is below 30%. Today her H&H is 12.2 and 38.4  Follow up in 12 weeks to see how well she is doing     Follow up in 12 weeks.        Follow-up Disposition: Not on File   Orders Placed This Encounter    COMPLETE CBC & AUTO DIFF WBC    InHouse CBC ([employer])     Standing Status:   Future     Number of Occurrences:   1     Standing Expiration Date:   2/20/2019    IRON PROFILE     Standing Status:   Future     Standing Expiration Date:   2/14/2020    FERRITIN     Standing Status:   Future     Standing Expiration Date:   7/35/4103    METABOLIC PANEL, COMPREHENSIVE     Standing Status:   Future     Standing Expiration Date:   2/14/2020       Joseline Hunter NP  2/13/2019       I have assessed the patient independently and  agree with the full assessment as outlined. Jaleel Licea MD, FACP    Addendum 2/25/2019: I was asked today if the patient would be cleared from the hematologic standpoint to undergo an orthopedic surgical procedure. As noted above on 2/13/2019 the patient had a normal hemoglobin of 12.2 g/dL with a normal hematocrit of 38.4. Therefore she is cleared from the hematologic standpoint to undergo her surgical procedure. Jin Navarro MD, [address]

## 2019-02-13 NOTE — PATIENT INSTRUCTIONS
Iron Deficiency Anemia: Care Instructions  Your Care Instructions    Anemia means that you do not have enough red blood cells. Red blood cells carry oxygen around your body. When you have anemia, it can make you pale, weak, and tired. Many things can cause anemia. The most common cause is loss of blood. This can happen if you have heavy menstrual periods. It can also happen if you have bleeding in your stomach or bowel. You can also get anemia if you don't have enough iron in your diet or if it's hard for your body to absorb iron. In some cases, pregnancy causes anemia. That's because a pregnant woman needs more iron. Your doctor may do more tests to find the cause of your anemia. If a disease or other health problem is causing it, your doctor will treat that problem. It's important to follow up with your doctor to make sure that your iron level returns to normal.  Follow-up care is a key part of your treatment and safety. Be sure to make and go to all appointments, and call your doctor if you are having problems. It's also a good idea to know your test results and keep a list of the medicines you take. How can you care for yourself at home? · If your doctor recommended iron pills, take them as directed. ? Try to take the pills on an empty stomach. You can do this about 1 hour before or 2 hours after meals. But you may need to take iron with food to avoid an upset stomach. ? Do not take antacids or drink milk or anything with caffeine within 2 hours of when you take your iron. They can keep your body from absorbing the iron well. ? Vitamin C helps your body absorb iron. You may want to take iron pills with a glass of orange juice or some other food high in vitamin C.  ? Iron pills may cause stomach problems. These include heartburn, nausea, diarrhea, constipation, and cramps. It can help to drink plenty of fluids and include fruits, vegetables, and fiber in your diet.   ? It's normal for iron pills to make your stool a greenish or grayish black. But internal bleeding can also cause dark stool. So it's important to tell your doctor about any color changes. ? Call your doctor if you think you are having a problem with your iron pills. Even after you start to feel better, it will take several months for your body to build up its supply of iron. ? If you miss a pill, don't take a double dose. ? Keep iron pills out of the reach of small children. Too much iron can be very dangerous. · Eat foods with a lot of iron. These include red meat, shellfish, poultry, and eggs. They also include beans, raisins, whole-grain bread, and leafy green vegetables. · Steam your vegetables. This is the best way to prepare them if you want to get as much iron as possible. · Be safe with medicines. Do not take nonsteroidal anti-inflammatory pain relievers unless your doctor tells you to. These include aspirin, naproxen (Aleve), and ibuprofen (Advil, Motrin). · Liquid iron can stain your teeth. But you can mix it with water or juice and drink it with a straw. Then it won't get on your teeth. When should you call for help? Call 911 anytime you think you may need emergency care. For example, call if:    · You passed out (lost consciousness).    Call your doctor now or seek immediate medical care if:    · You are short of breath.     · You are dizzy or light-headed, or you feel like you may faint.     · You have new or worse bleeding.    Watch closely for changes in your health, and be sure to contact your doctor if:    · You feel weaker or more tired than usual.     · You do not get better as expected. Where can you learn more? Go to http://shawn-stevie.info/. Enter C995 in the search box to learn more about \"Iron Deficiency Anemia: Care Instructions. \"  Current as of: May 6, 2018  Content Version: 11.9  © 5949-2094 [employer].  Care instructions adapted under license by [employer] (which disclaims liability or warranty for this information). If you have questions about a medical condition or this instruction, always ask your healthcare professional. [address] any warranty or liability for your use of this information. Complete Blood Count (CBC): About This Test  What is it? A complete blood count (CBC) is a blood test that gives important information about your blood cells, especially red blood cells, white blood cells, and platelets. Why is this test done? A CBC may be done as part of a regular physical exam. There are many other reasons that a doctor may want this blood test, including to:  · Find the cause of symptoms such as fatigue, weakness, fever, bruising, or weight loss. · Find anemia or an infection. · See how much blood has been lost if there is bleeding. · Diagnose diseases of the blood, such as leukemia or polycythemia. How can you prepare for the test?  You do not need to do anything before having this test.  What happens during the test?  The health professional taking a sample of your blood will:  · Wrap an elastic band around your upper arm. This makes the veins below the band larger so it is easier to put a needle into the vein. · Clean the needle site with alcohol. · Put the needle into the vein. · Attach a tube to the needle to fill it with blood. · Remove the band from your arm when enough blood is collected. · Put a gauze pad or cotton ball over the needle site as the needle is removed. · Put pressure on the site and then put on a bandage. If this blood test is done on a baby, a heel stick may be done instead of a blood draw from a vein. What happens after the test?  · You will probably be able to go home right away. · You can go back to your usual activities right away. Follow-up care is a key part of your treatment and safety. Be sure to make and go to all appointments, and call your doctor if you are having problems. It's also a good idea to keep a list of the medicines you take. Ask your doctor when you can expect to have your test results. Where can you learn more? Go to http://shawn-stevie.info/. Enter P367 in the search box to learn more about \"Complete Blood Count (CBC): About This Test.\"  Current as of: June 25, 2018  Content Version: 11.9  © 2481-6055 [employer]. Care instructions adapted under license by [employer] (which disclaims liability or warranty for this information). If you have questions about a medical condition or this instruction, always ask your healthcare professional. [address] any warranty or liability for your use of this information.

## 2019-02-22 ENCOUNTER — TELEPHONE (OUTPATIENT)
Dept: ONCOLOGY | Age: 79
End: 2019-02-22

## 2019-02-22 NOTE — TELEPHONE ENCOUNTER
I explained to patient progress note from 02/13/19 not signed yet and no mention of surgical clearance. I suggested patient call surgeon/Dr Giron (office number [phone]) to have form faxed requesting clearance. Spoke with Woo Guzman at Dr. Kiran Abad office per patient stated they will not fax over form for surgical clearance. Woo Guzman requested last office note to be faxed to [phone] to make sure Dr. Sandro Doty cleared patient for knee replacement. I spoke with Dr. Sandro Doty  Concerning  no where in office note does it release patient for surgery and office won't fax over release request. Dr Sandro Doty said hemoglobin is normal so surgery can be done. Office note from 02/13/2019 was faxed @ 4:35 pjanine.

## 2019-06-03 ENCOUNTER — HOSPITAL ENCOUNTER (OUTPATIENT)
Dept: LAB | Age: 79
Discharge: HOME | End: 2019-06-03
Payer: MEDICARE

## 2019-06-03 ENCOUNTER — OFFICE VISIT (OUTPATIENT)
Dept: ONCOLOGY | Age: 79
End: 2019-06-03

## 2019-06-03 ENCOUNTER — HOSPITAL ENCOUNTER (OUTPATIENT)
Dept: ONCOLOGY | Age: 79
Discharge: HOME | End: 2019-06-03

## 2019-06-03 VITALS
SYSTOLIC BLOOD PRESSURE: 142 MMHG | HEART RATE: 94 BPM | OXYGEN SATURATION: 94 % | WEIGHT: 172.6 LBS | TEMPERATURE: 98.2 F | HEIGHT: 64 IN | BODY MASS INDEX: 29.47 KG/M2 | DIASTOLIC BLOOD PRESSURE: 78 MMHG

## 2019-06-03 DIAGNOSIS — D50.8 IRON DEFICIENCY ANEMIA SECONDARY TO INADEQUATE DIETARY IRON INTAKE: ICD-10-CM

## 2019-06-03 DIAGNOSIS — D63.1 ANEMIA DUE TO STAGE 3 CHRONIC KIDNEY DISEASE (HCC): ICD-10-CM

## 2019-06-03 DIAGNOSIS — N18.30 ANEMIA DUE TO STAGE 3 CHRONIC KIDNEY DISEASE (HCC): ICD-10-CM

## 2019-06-03 DIAGNOSIS — D75.839 THROMBOCYTOSIS: ICD-10-CM

## 2019-06-03 DIAGNOSIS — D50.8 IRON DEFICIENCY ANEMIA SECONDARY TO INADEQUATE DIETARY IRON INTAKE: Primary | ICD-10-CM

## 2019-06-03 LAB
ALBUMIN SERPL-MCNC: 3.3 G/DL (ref 3.4–5)
ALBUMIN/GLOB SERPL: 0.9 {RATIO} (ref 0.8–1.7)
ALP SERPL-CCNC: 271 U/L (ref 45–117)
ALT SERPL-CCNC: 17 U/L (ref 13–56)
ANION GAP SERPL CALC-SCNC: 8 MMOL/L (ref 3–18)
AST SERPL-CCNC: 16 U/L (ref 15–37)
BASO+EOS+MONOS # BLD AUTO: 1.2 K/UL (ref 0–2.3)
BASO+EOS+MONOS NFR BLD AUTO: 13 % (ref 0.1–17)
BILIRUB SERPL-MCNC: 0.5 MG/DL (ref 0.2–1)
BUN SERPL-MCNC: 14 MG/DL (ref 7–18)
BUN/CREAT SERPL: 11 (ref 12–20)
CALCIUM SERPL-MCNC: 8.8 MG/DL (ref 8.5–10.1)
CHLORIDE SERPL-SCNC: 105 MMOL/L (ref 100–108)
CO2 SERPL-SCNC: 27 MMOL/L (ref 21–32)
CREAT SERPL-MCNC: 1.31 MG/DL (ref 0.6–1.3)
DIFFERENTIAL METHOD BLD: ABNORMAL
ERYTHROCYTE [DISTWIDTH] IN BLOOD BY AUTOMATED COUNT: 15.5 % (ref 11.5–14.5)
FERRITIN SERPL-MCNC: 164 NG/ML (ref 8–388)
GLOBULIN SER CALC-MCNC: 3.5 G/DL (ref 2–4)
GLUCOSE SERPL-MCNC: 93 MG/DL (ref 74–99)
HCT VFR BLD AUTO: 32.3 % (ref 36–48)
HGB BLD-MCNC: 10.2 G/DL (ref 12–16)
IRON SATN MFR SERPL: 18 %
IRON SERPL-MCNC: 46 UG/DL (ref 50–175)
LYMPHOCYTES # BLD: 1.6 K/UL (ref 1.1–5.9)
LYMPHOCYTES NFR BLD: 18 % (ref 14–44)
MCH RBC QN AUTO: 30.2 PG (ref 25–35)
MCHC RBC AUTO-ENTMCNC: 31.6 G/DL (ref 31–37)
MCV RBC AUTO: 95.6 FL (ref 78–102)
NEUTS SEG # BLD: 6.2 K/UL (ref 1.8–9.5)
NEUTS SEG NFR BLD: 70 % (ref 40–70)
PLATELET # BLD AUTO: 252 K/UL (ref 140–440)
POTASSIUM SERPL-SCNC: 3.8 MMOL/L (ref 3.5–5.5)
PROT SERPL-MCNC: 6.8 G/DL (ref 6.4–8.2)
RBC # BLD AUTO: 3.38 M/UL (ref 4.1–5.1)
SODIUM SERPL-SCNC: 140 MMOL/L (ref 136–145)
TIBC SERPL-MCNC: 260 UG/DL (ref 250–450)
WBC # BLD AUTO: 9 K/UL (ref 4.5–13)

## 2019-06-03 PROCEDURE — 80053 COMPREHEN METABOLIC PANEL: CPT

## 2019-06-03 PROCEDURE — 82728 ASSAY OF FERRITIN: CPT

## 2019-06-03 PROCEDURE — 36415 COLL VENOUS BLD VENIPUNCTURE: CPT

## 2019-06-03 PROCEDURE — 83540 ASSAY OF IRON: CPT

## 2019-06-03 NOTE — PATIENT INSTRUCTIONS
Anemia From Chronic Disease: Care Instructions  Your Care Instructions    Anemia is a low level of red blood cells. Red blood cells carry oxygen from your lungs to the rest of your body. Sometimes when you have a long-term (chronic) disease, such as kidney disease, arthritis, diabetes, cancer, or an infection, your body does not make enough red blood cells. Follow-up care is a key part of your treatment and safety. Be sure to make and go to all appointments, and call your doctor if you are having problems. It's also a good idea to know your test results and keep a list of the medicines you take. How can you care for yourself at home? · Follow your doctor's instructions to treat the chronic condition that is causing the anemia. · Be safe with medicines. Take your medicine to treat your chronic condition exactly as prescribed. Call your doctor if you think you are having a problem with your medicine. · Take your medicine for anemia exactly as prescribed. Call your doctor if you think you are having a problem with your medicine. Medicines to increase the number of red blood cells (such as epoetin or darbepoetin) may be given as an injection. ? If you miss a dose, take it as soon as you can, unless it is almost time for your next dose. In that case, get back on your regular schedule and take only one dose. ? Do not freeze this medicine. Store it in the refrigerator. Do not shake the bottle before you prepare the shot. · Keep all your appointments for blood tests to check on your hemoglobin levels. When should you call for help? Call 911 anytime you think you may need emergency care.  For example, call if:    · You passed out (lost consciousness).    Call your doctor now or seek immediate medical care if:    · You are short of breath.     · You are dizzy or light-headed, or you feel like you may faint.     · You have new or worse bleeding.    Watch closely for changes in your health, and be sure to contact your doctor if:    · You feel weaker or more tired than usual.     · You do not get better as expected. Where can you learn more? Go to http://shawn-stevie.info/. Enter E502 in the search box to learn more about \"Anemia From Chronic Disease: Care Instructions. \"  Current as of: May 6, 2018  Content Version: 11.9  © 9995-5552 [employer]. Care instructions adapted under license by [employer] (which disclaims liability or warranty for this information). If you have questions about a medical condition or this instruction, always ask your healthcare professional. [place] [address] any warranty or liability for your use of this information.

## 2019-06-03 NOTE — PROGRESS NOTES
Hematology/Oncology  Progress Note    Name: Cari De Anda  Date: 6/3/2019  : 1940     Primary Care Provider: Manasa Paris NP      Ms. Abe Carlos is a [de-identified]y.o. year old female with severe iron deficiency anemia and anemia of chronic kidney disease     Current Therapy: Venofor as needed (completed on 2018)    Subjective:        Mrs. Abe Carlos is a 78-year-old woman who was referred for evaluation of severe anemia. She was referred to the hospital for transfusion of 2 units of packed red blood cells. She was found to have chronic kidney disease which is contributing to her anemia. Based on these data the patient has anemia associated with both iron deficiency and chronic kidney disease. Intravenous iron therapy with Venofer at a dose of 250 mg every 2 weeks for 4 doses was recommended and completed on 2018. Since her last clinic visit the patient had surgery on her knee and is ambulating with the use of walker at this time. She has no new physical complaints. The past medical, surgical and social history has been reviewed and remains unchanged.    Past Medical History:   Diagnosis Date    Arrhythmia     hx afib    Edema 10/9/2014    sec to dependency, chf, mr, knee surgeries & norvasc reduce salt & fluid as no other Sx now     Essential hypertension     GERD (gastroesophageal reflux disease)     Malignant neoplasm of skin 2010    Murmur, cardiac      Past Surgical History:   Procedure Laterality Date    HX HYSTERECTOMY      HX KNEE REPLACEMENT      HX ORTHOPAEDIC      bilateral feet surgery     Social History     Socioeconomic History    Marital status:      Spouse name: Not on file    Number of children: Not on file    Years of education: Not on file    Highest education level: Not on file   Occupational History    Not on file   Social Needs    Financial resource strain: Not on file    Food insecurity:     Worry: Not on file     Inability: Not on file   [facility] Transportation needs:     Medical: Not on file     Non-medical: Not on file   Tobacco Use    Smoking status: Never Smoker    Smokeless tobacco: Never Used   Substance and Sexual Activity    Alcohol use: No    Drug use: No    Sexual activity: Not on file   Lifestyle    Physical activity:     Days per week: Not on file     Minutes per session: Not on file    Stress: Not on file   Relationships    Social connections:     Talks on phone: Not on file     Gets together: Not on file     Attends Faith service: Not on file     Active member of club or organization: Not on file     Attends meetings of clubs or organizations: Not on file     Relationship status: Not on file    Intimate partner violence:     Fear of current or ex partner: Not on file     Emotionally abused: Not on file     Physically abused: Not on file     Forced sexual activity: Not on file   Other Topics Concern    Not on file   Social History Narrative    Not on file     Family History   Problem Relation Age of Onset    Heart Surgery Neg Hx     Heart Attack Neg Hx     Stroke Neg Hx      Current Outpatient Medications   Medication Sig Dispense Refill    ELIQUIS 5 mg tablet       dilTIAZem (TIAZAC) 360 mg ER capsule       mupirocin (BACTROBAN) 2 % ointment       hydroxychloroquine (PLAQUENIL) 200 mg tablet       nitrofurantoin, macrocrystal-monohydrate, (MACROBID) 100 mg capsule       nystatin (MYCOSTATIN) 100,000 unit/mL suspension RINSE WITH 2-5MLS BY MOUTH AND SWALLOW FOUR TIMES A DAY  0    lisinopril (PRINIVIL, ZESTRIL) 5 mg tablet Take  by mouth daily.  potassium chloride (K-DUR, KLOR-CON) 10 mEq tablet Take 1 Tab by mouth daily. 30 Tab 1    naproxen (NAPROSYN) 500 mg tablet Take 500 mg by mouth daily.  fexofenadine (ALLEGRA) 180 mg tablet Take  by mouth daily.  triamcinolone acetonide (KENALOG) 0.1 % ointment Apply  to affected area two (2) times a day.  use thin layer      hydrocodone-acetaminophen (NORCO) 5-325 mg per tablet Take 1 tablet by mouth every four (4) hours as needed for Pain.  docusate sodium (COLACE) 100 mg capsule Take 100 mg by mouth daily.  traMADol (ULTRAM) 50 mg tablet Take 50 mg by mouth every six (6) hours as needed for Pain.  amLODIPine (NORVASC) 10 mg tablet 10 mg. Take 1 Tab by Mouth Once a Day.  aspirin 81 mg tablet 81 Tabs. Take 81 mg by Mouth Once a Day.  gabapentin (NEURONTIN) 300 mg capsule 300 mg. Take 1 Cap by Mouth 3 Times Daily.  esomeprazole (NEXIUM) 40 mg capsule Take 1 Cap by Mouth Once a Day.  pramipexole (MIRAPEX) 0.125 mg tablet Take 1 Tab by Mouth 3 Times Daily.  estradiol (ESTRACE) 1 mg tablet Take 1 mg by Mouth Once a Day. Review of Systems  General :The patient has no complaints and there is no physical distress evident. Psychological : patient denies having any psychological symptoms such as hallucinations depression or anxiety. Ophthalmic:the patient denies having any visual impairment or eye discomfort. ENT: there are no abnormalities reported. Allergy and Immunology:the patient denies having any seasonal allergies or allergies to medications other than those already outlined above. Hematological and Lymphatic: the patient denies having any bruising, bleeding or lymphadenopathy. Endocrine: the patient denies having any heat or cold intolerance. There is no history of diabetes or thyroid disorders. Breast: the patient denies having any history of breast mass or lumps. Respiratory:the patient denies having any cough, shortness of breath, or dyspnea on exertion. Cardiovascular: there are no complaints of chest pain, palpitations, chest pounding, or dyspnea on exertion. Gastrointestinal: the patient denies having nausea, emesis, diarrhea, constipation, or blood in the stool. Genito-Urinary: the patient denies having urinary urgency, frequency, or dysuria.    Musculoskeletal: with the exception of mild arthralgias the patient has no other musculoskeletal complaints. Neurological:  denies having any numbness, tingling, or neurologic deficits. Dermatological: patient denies having any unexplained rash, skin ulcerations, or hives. Objective:     Visit Vitals  /78   Pulse 94   Temp 98.2 °F (36.8 °C)   Ht 5' 4\" (1.626 m)   Wt 78.3 kg (172 lb 9.6 oz)   SpO2 94%   BMI 29.63 kg/m²     Pain Score: 0    Physical Exam:   ECO  General: Well appearing, in NAD  Skin: examination of the skin reveals no bruising, rash or petechiae  HEENT: Normocephalic, atraumatic. Conjunctiva and sclera are clear. Pupils are equal, round and reactive to light. EOMs are intact. ENT without oral mucosal lesions, stomatitis or thrush  Neck: supple without lymphadenopathy, JVD or thyromegaly  Lymphatics: no palpable cervical, supraclavicular, axillary or inguinal lymphadenopathy  Lungs: clear breath sounds bilaterally, no rhonchi or wheezes noted  Heart: Regular rate and rhythm, no murmurs, rubs or gallops, S1-S2 noted. Positive peripheral pulses bilaterally upper and lower extremities  Abdomen: soft, non-tender, non-distended, no HSM, positive bowel sounds  Extremities: without clubbing, cyanosis or edema  Neurologic: no focal deficits, she is using a walker for support and mobility, Alert and oriented x 3.   Psychologic: mood and affect are appropriate, no anxiety or depression noted    Laboratory Data:     Results for orders placed or performed during the hospital encounter of 19   CBC WITH 3 PART DIFF     Status: Abnormal   Result Value Ref Range Status    WBC 9.0 4.5 - 13.0 K/uL Final    RBC 3.38 (L) 4.10 - 5.10 M/uL Final    HGB 10.2 (L) 12.0 - 16 g/dL Final    HCT 32.3 (L) 36 - 48 % Final    MCV 95.6 78 - 102 FL Final    MCH 30.2 25.0 - 35.0 PG Final    MCHC 31.6 31 - 37 g/dL Final    RDW 15.5 (H) 11.5 - 14.5 % Final    PLATELET 110 711 - 544 K/uL Final    NEUTROPHILS 70 40 - 70 % Final    MIXED CELLS 13 0.1 - 17 % Final LYMPHOCYTES 18 14 - 44 % Final    ABS. NEUTROPHILS 6.2 1.8 - 9.5 K/UL Final    ABS. MIXED CELLS 1.2 0.0 - 2.3 K/uL Final    ABS. LYMPHOCYTES 1.6 1.1 - 5.9 K/UL Final     Comment: Test performed at [address] [place] [address] or Outpatient Infusion Center Location. Reviewed by Medical Director. DF AUTOMATED   Final            Patient Active Problem List   Diagnosis Code    Nonspecific abnormal electrocardiogram (ECG) (EKG) R94.31    Essential hypertension, benign I10    Pre-operative cardiovascular examination Z01.810    Undiagnosed cardiac murmurs R01.1    Mitral valve disorders(424.0) I05.9    Edema R60.9    Nausea and vomiting in adult R11.2    Localized osteoarthrosis not specified whether primary or secondary, lower leg M17.10    Hyperkalemia E87.5    GERD (gastroesophageal reflux disease) K21.9    DJD (degenerative joint disease) M19.90    Disorder of bone and cartilage M89.9, A42.8    Diastolic dysfunction with chronic heart failure (HCC) I50.32    Community acquired pneumonia of left lower lobe of lung ([address] J18.1    Chest tightness R07.89    Chest pain R07.9    CAP (community acquired pneumonia) J18.9    A-fib (HCC) I48.91    Abdominal pain R10.9    Paroxysmal atrial fibrillation (HCC) I48.0    Postoperative anemia due to acute blood loss D62    RLS (restless legs syndrome) G25.81    Sepsis (HCC) A41.9    Malignant neoplasm of skin C44.90    Iron deficiency anemia secondary to inadequate dietary iron intake D50.8    Thrombocytosis (HCC) F37.5    Neutrophilic leukocytosis I37.4         Assessment:     1. Iron deficiency anemia secondary to inadequate dietary iron intake    2. Thrombocytosis ([address]    3. Anemia due to stage 3 chronic kidney disease (HCC)      Plan:   Iron deficiency Anemia/anemia of chronic kidney disease stage III: I have explained to patient that her WBC for today is normal at 9, the hemoglobin is 10.2 g/dL with hematocrit of 32%.   The platelet count is 573,432. She did completed 4 doses of Venofer on 12/11/2018 and that seems to have help significantly. I have informed patient that she will be treated with Procrit at a dose of 60,000 units every 2-4 weeks whenever the hemoglobin is below 10 g/dL and hematocrit is below 30%. Today her H&H is 12.2 and 38.4  Follow up in 12 weeks to see how well she is doing    Thrombocytosis: The current platelet count is stable at 252,000. Follow-up in 3 months    Follow up in 12 weeks. Follow-up and Dispositions  ·   Return in about 3 months (around 8/26/2019).         Orders Placed This Encounter    COMPLETE CBC & AUTO DIFF WBC    InHouse CBC ([employer])     Standing Status:   Future     Number of Occurrences:   1     Standing Expiration Date:   5/15/4300    METABOLIC PANEL, COMPREHENSIVE     Standing Status:   Future     Standing Expiration Date:   6/3/2020    IRON PROFILE     Standing Status:   Future     Standing Expiration Date:   6/3/2020    FERRITIN     Standing Status:   Future     Standing Expiration Date:   6/3/2020       Jayme Rehman MD  6/3/2019

## 2019-08-26 ENCOUNTER — HOSPITAL ENCOUNTER (OUTPATIENT)
Dept: LAB | Age: 79
Discharge: HOME | End: 2019-08-26
Payer: MEDICARE

## 2019-08-26 ENCOUNTER — OFFICE VISIT (OUTPATIENT)
Dept: ONCOLOGY | Age: 79
End: 2019-08-26

## 2019-08-26 VITALS
BODY MASS INDEX: 30.73 KG/M2 | HEART RATE: 88 BPM | TEMPERATURE: 97.7 F | HEIGHT: 64 IN | SYSTOLIC BLOOD PRESSURE: 146 MMHG | DIASTOLIC BLOOD PRESSURE: 76 MMHG | OXYGEN SATURATION: 97 % | WEIGHT: 180 LBS | RESPIRATION RATE: 17 BRPM

## 2019-08-26 DIAGNOSIS — D75.839 THROMBOCYTOSIS: ICD-10-CM

## 2019-08-26 DIAGNOSIS — N18.30 ANEMIA DUE TO STAGE 3 CHRONIC KIDNEY DISEASE (HCC): ICD-10-CM

## 2019-08-26 DIAGNOSIS — D63.1 ANEMIA DUE TO STAGE 3 CHRONIC KIDNEY DISEASE (HCC): ICD-10-CM

## 2019-08-26 DIAGNOSIS — D50.8 IRON DEFICIENCY ANEMIA SECONDARY TO INADEQUATE DIETARY IRON INTAKE: Primary | ICD-10-CM

## 2019-08-26 DIAGNOSIS — D50.8 IRON DEFICIENCY ANEMIA SECONDARY TO INADEQUATE DIETARY IRON INTAKE: ICD-10-CM

## 2019-08-26 PROBLEM — R73.9 HYPERGLYCEMIA: Status: ACTIVE | Noted: 2018-11-01

## 2019-08-26 PROBLEM — K92.2 GI BLEED: Status: ACTIVE | Noted: 2018-11-01

## 2019-08-26 PROBLEM — E83.42 HYPOMAGNESEMIA: Status: ACTIVE | Noted: 2018-11-01

## 2019-08-26 PROBLEM — M17.12 ARTHRITIS OF KNEE, LEFT: Status: ACTIVE | Noted: 2019-04-02

## 2019-08-26 PROBLEM — A41.51 SEPSIS DUE TO ESCHERICHIA COLI (HCC): Status: ACTIVE | Noted: 2018-12-09

## 2019-08-26 PROBLEM — Z96.652 S/P TOTAL KNEE ARTHROPLASTY, LEFT: Status: ACTIVE | Noted: 2019-04-05

## 2019-08-26 PROBLEM — M25.512 ACUTE PAIN OF LEFT SHOULDER: Status: ACTIVE | Noted: 2018-11-02

## 2019-08-26 PROBLEM — F32.1 MODERATE SINGLE CURRENT EPISODE OF MAJOR DEPRESSIVE DISORDER (HCC): Status: ACTIVE | Noted: 2019-01-31

## 2019-08-26 PROBLEM — N39.0 UTI (URINARY TRACT INFECTION): Status: ACTIVE | Noted: 2018-12-06

## 2019-08-26 PROBLEM — E86.0 DEHYDRATION: Status: ACTIVE | Noted: 2018-12-06

## 2019-08-26 PROBLEM — N17.9 AKI (ACUTE KIDNEY INJURY) (HCC): Status: ACTIVE | Noted: 2018-11-01

## 2019-08-26 LAB
ALBUMIN SERPL-MCNC: 3.5 G/DL (ref 3.4–5)
ALBUMIN/GLOB SERPL: 0.9 {RATIO} (ref 0.8–1.7)
ALP SERPL-CCNC: 230 U/L (ref 45–117)
ALT SERPL-CCNC: 23 U/L (ref 13–56)
ANION GAP SERPL CALC-SCNC: 5 MMOL/L (ref 3–18)
AST SERPL-CCNC: 18 U/L (ref 10–38)
BASOPHILS # BLD: 0.1 K/UL (ref 0–0.1)
BASOPHILS NFR BLD: 1 % (ref 0–2)
BILIRUB SERPL-MCNC: 0.5 MG/DL (ref 0.2–1)
BUN SERPL-MCNC: 22 MG/DL (ref 7–18)
BUN/CREAT SERPL: 16 (ref 12–20)
CALCIUM SERPL-MCNC: 9 MG/DL (ref 8.5–10.1)
CHLORIDE SERPL-SCNC: 104 MMOL/L (ref 100–111)
CO2 SERPL-SCNC: 31 MMOL/L (ref 21–32)
CREAT SERPL-MCNC: 1.39 MG/DL (ref 0.6–1.3)
DIFFERENTIAL METHOD BLD: ABNORMAL
EOSINOPHIL # BLD: 0.3 K/UL (ref 0–0.4)
EOSINOPHIL NFR BLD: 4 % (ref 0–5)
ERYTHROCYTE [DISTWIDTH] IN BLOOD BY AUTOMATED COUNT: 15.9 % (ref 11.6–14.5)
GLOBULIN SER CALC-MCNC: 3.8 G/DL (ref 2–4)
GLUCOSE SERPL-MCNC: 80 MG/DL (ref 74–99)
HCT VFR BLD AUTO: 36.4 % (ref 35–45)
HGB BLD-MCNC: 11.3 G/DL (ref 12–16)
LYMPHOCYTES # BLD: 2 K/UL (ref 0.9–3.6)
LYMPHOCYTES NFR BLD: 23 % (ref 21–52)
MCH RBC QN AUTO: 28.1 PG (ref 24–34)
MCHC RBC AUTO-ENTMCNC: 31 G/DL (ref 31–37)
MCV RBC AUTO: 90.5 FL (ref 74–97)
MONOCYTES # BLD: 0.7 K/UL (ref 0.05–1.2)
MONOCYTES NFR BLD: 8 % (ref 3–10)
NEUTS SEG # BLD: 5.5 K/UL (ref 1.8–8)
NEUTS SEG NFR BLD: 64 % (ref 40–73)
PLATELET # BLD AUTO: 327 K/UL (ref 135–420)
PMV BLD AUTO: 10.1 FL (ref 9.2–11.8)
POTASSIUM SERPL-SCNC: 4.2 MMOL/L (ref 3.5–5.5)
PROT SERPL-MCNC: 7.3 G/DL (ref 6.4–8.2)
RBC # BLD AUTO: 4.02 M/UL (ref 4.2–5.3)
SODIUM SERPL-SCNC: 140 MMOL/L (ref 136–145)
WBC # BLD AUTO: 8.5 K/UL (ref 4.6–13.2)

## 2019-08-26 PROCEDURE — 36415 COLL VENOUS BLD VENIPUNCTURE: CPT

## 2019-08-26 PROCEDURE — 83540 ASSAY OF IRON: CPT

## 2019-08-26 PROCEDURE — 85025 COMPLETE CBC W/AUTO DIFF WBC: CPT

## 2019-08-26 PROCEDURE — 82728 ASSAY OF FERRITIN: CPT

## 2019-08-26 PROCEDURE — 80053 COMPREHEN METABOLIC PANEL: CPT

## 2019-08-26 NOTE — PROGRESS NOTES
Hematology/Oncology  Progress Note    Name: Lexis Bruce  Date: 2019  : 1940     Primary Care Provider: Nathan Lubin NP      Ms. Dany Hanson is a [de-identified]y.o. year old female with severe iron deficiency anemia and anemia of chronic kidney disease     Current Therapy: Venofor as needed (completed on 2018)    Subjective:        Mrs. Dany Hanson is a 68-year-old woman who is  Is being seen for severe anemia. She has previously completed iron therapy with Venofer at a dose of 250 mg every 2 weeks for 4 doses on 2018. She is using a walker for support and mobility. She has no new physical complaints. The past medical, surgical and social history has been reviewed and remains unchanged.    Past Medical History:   Diagnosis Date    Arrhythmia     hx afib    Edema 10/9/2014    sec to dependency, chf, mr, knee surgeries & norvasc reduce salt & fluid as no other Sx now     Essential hypertension     GERD (gastroesophageal reflux disease)     Malignant neoplasm of skin 2010    Murmur, cardiac      Past Surgical History:   Procedure Laterality Date    HX HYSTERECTOMY      HX KNEE REPLACEMENT      HX ORTHOPAEDIC      bilateral feet surgery     Social History     Socioeconomic History    Marital status:      Spouse name: Not on file    Number of children: Not on file    Years of education: Not on file    Highest education level: Not on file   Occupational History    Not on file   Social Needs    Financial resource strain: Not on file    Food insecurity:     Worry: Not on file     Inability: Not on file    Transportation needs:     Medical: Not on file     Non-medical: Not on file   Tobacco Use    Smoking status: Never Smoker    Smokeless tobacco: Never Used   Substance and Sexual Activity    Alcohol use: No    Drug use: No    Sexual activity: Not on file   Lifestyle    Physical activity:     Days per week: Not on file     Minutes per session: Not on file    Stress: Not on file Relationships    Social connections:     Talks on phone: Not on file     Gets together: Not on file     Attends Yarsanism service: Not on file     Active member of club or organization: Not on file     Attends meetings of clubs or organizations: Not on file     Relationship status: Not on file    Intimate partner violence:     Fear of current or ex partner: Not on file     Emotionally abused: Not on file     Physically abused: Not on file     Forced sexual activity: Not on file   Other Topics Concern    Not on file   Social History Narrative    Not on file     Family History   Problem Relation Age of Onset    Heart Surgery Neg Hx     Heart Attack Neg Hx     Stroke Neg Hx      Current Outpatient Medications   Medication Sig Dispense Refill    ELIQUIS 5 mg tablet       dilTIAZem (TIAZAC) 360 mg ER capsule       mupirocin (BACTROBAN) 2 % ointment       hydroxychloroquine (PLAQUENIL) 200 mg tablet       nitrofurantoin, macrocrystal-monohydrate, (MACROBID) 100 mg capsule       nystatin (MYCOSTATIN) 100,000 unit/mL suspension RINSE WITH 2-5MLS BY MOUTH AND SWALLOW FOUR TIMES A DAY  0    lisinopril (PRINIVIL, ZESTRIL) 5 mg tablet Take  by mouth daily.  potassium chloride (K-DUR, KLOR-CON) 10 mEq tablet Take 1 Tab by mouth daily. 30 Tab 1    naproxen (NAPROSYN) 500 mg tablet Take 500 mg by mouth daily.  fexofenadine (ALLEGRA) 180 mg tablet Take  by mouth daily.  triamcinolone acetonide (KENALOG) 0.1 % ointment Apply  to affected area two (2) times a day. use thin layer      hydrocodone-acetaminophen (NORCO) 5-325 mg per tablet Take 1 tablet by mouth every four (4) hours as needed for Pain.  docusate sodium (COLACE) 100 mg capsule Take 100 mg by mouth daily.  traMADol (ULTRAM) 50 mg tablet Take 50 mg by mouth every six (6) hours as needed for Pain.  amLODIPine (NORVASC) 10 mg tablet 10 mg. Take 1 Tab by Mouth Once a Day.  aspirin 81 mg tablet 81 Tabs.  Take 81 mg by Mouth Once a Day.  gabapentin (NEURONTIN) 300 mg capsule 300 mg. Take 1 Cap by Mouth 3 Times Daily.  esomeprazole (NEXIUM) 40 mg capsule Take 1 Cap by Mouth Once a Day.  pramipexole (MIRAPEX) 0.125 mg tablet Take 1 Tab by Mouth 3 Times Daily.  estradiol (ESTRACE) 1 mg tablet Take 1 mg by Mouth Once a Day. Review of Systems  General :The patient has no complaints and there is no physical distress evident. Psychological : patient denies having any psychological symptoms such as hallucinations depression or anxiety. Ophthalmic:the patient denies having any visual impairment or eye discomfort. ENT: there are no abnormalities reported. Allergy and Immunology:the patient denies having any seasonal allergies or allergies to medications other than those already outlined above. Hematological and Lymphatic: the patient denies having any bruising, bleeding or lymphadenopathy. Endocrine: the patient denies having any heat or cold intolerance. There is no history of diabetes or thyroid disorders. Breast: the patient denies having any history of breast mass or lumps. Respiratory:the patient denies having any cough, shortness of breath, or dyspnea on exertion. Cardiovascular: there are no complaints of chest pain, palpitations, chest pounding, or dyspnea on exertion. Gastrointestinal: the patient denies having nausea, emesis, diarrhea, constipation, or blood in the stool. Genito-Urinary: the patient denies having urinary urgency, frequency, or dysuria. Musculoskeletal: with the exception of mild arthralgias the patient has no other musculoskeletal complaints. Neurological:  denies having any numbness, tingling, or neurologic deficits. Dermatological: patient denies having any unexplained rash, skin ulcerations, or hives.     Objective:     Visit Vitals  /76   Pulse 88   Temp 97.7 °F (36.5 °C) (Oral)   Resp 17   Ht 5' 4\" (1.626 m)   Wt 81.6 kg (180 lb)   SpO2 97%   BMI 30.90 kg/m²     Pain Score: 0    Physical Exam:   ECO  General: Well appearing, in NAD  Skin: examination of the skin reveals no bruising, rash or petechiae  HEENT: Normocephalic, atraumatic. Conjunctiva and sclera are clear. Pupils are equal, round and reactive to light. EOMs are intact. ENT without oral mucosal lesions, stomatitis or thrush  Neck: supple without lymphadenopathy, JVD or thyromegaly  Lymphatics: no palpable cervical, supraclavicular, axillary or inguinal lymphadenopathy  Lungs: clear breath sounds bilaterally, no rhonchi or wheezes noted  Heart: Regular rate and rhythm, no murmurs, rubs or gallops, S1-S2 noted. Positive peripheral pulses bilaterally upper and lower extremities  Abdomen: soft, non-tender, non-distended, no HSM, positive bowel sounds  Extremities: without clubbing, cyanosis or edema  Neurologic: no focal deficits, she is using a walker for support and mobility, Alert and oriented x 3. Psychologic: mood and affect are appropriate, no anxiety or depression noted    Laboratory Data:     Results for orders placed or performed during the hospital encounter of 19   CBC WITH 3 PART DIFF     Status: Abnormal   Result Value Ref Range Status    WBC 9.0 4.5 - 13.0 K/uL Final    RBC 3.38 (L) 4.10 - 5.10 M/uL Final    HGB 10.2 (L) 12.0 - 16 g/dL Final    HCT 32.3 (L) 36 - 48 % Final    MCV 95.6 78 - 102 FL Final    MCH 30.2 25.0 - 35.0 PG Final    MCHC 31.6 31 - 37 g/dL Final    RDW 15.5 (H) 11.5 - 14.5 % Final    PLATELET 329 096 - 246 K/uL Final    NEUTROPHILS 70 40 - 70 % Final    MIXED CELLS 13 0.1 - 17 % Final    LYMPHOCYTES 18 14 - 44 % Final    ABS. NEUTROPHILS 6.2 1.8 - 9.5 K/UL Final    ABS. MIXED CELLS 1.2 0.0 - 2.3 K/uL Final    ABS. LYMPHOCYTES 1.6 1.1 - 5.9 K/UL Final     Comment: Test performed at [address] [place] [address] or Outpatient [facility]fusion [facility] Location. Reviewed by Medical Director.     DF AUTOMATED   Final            Patient Active Problem List   Diagnosis Code    Nonspecific abnormal electrocardiogram (ECG) (EKG) R94.31    Essential hypertension, benign I10    Pre-operative cardiovascular examination Z01.810    Undiagnosed cardiac murmurs R01.1    Mitral valve disorders(424.0) I05.9    Edema R60.9    Nausea and vomiting in adult R11.2    Localized osteoarthrosis not specified whether primary or secondary, lower leg M17.10    Hyperkalemia E87.5    GERD (gastroesophageal reflux disease) K21.9    DJD (degenerative joint disease) M19.90    Disorder of bone and cartilage M89.9, N07.5    Diastolic dysfunction with chronic heart failure (HCC) I50.32    Community acquired pneumonia of left lower lobe of lung ([facility] J18.1    Chest tightness R07.89    Chest pain R07.9    CAP (community acquired pneumonia) J18.9    A-fib ([facility]) I48.91    Abdominal pain R10.9    Paroxysmal atrial fibrillation ([facility]) I48.0    Postoperative anemia due to acute blood loss D62    RLS (restless legs syndrome) G25.81    Sepsis ([facility]) A41.9    Malignant neoplasm of skin C44.90    Iron deficiency anemia secondary to inadequate dietary iron intake D50.8    Thrombocytosis (HCC) O26.0    Neutrophilic leukocytosis N11.2    Acute pain of left shoulder M25.512    MONCHO (acute kidney injury) ([facility] N17.9    Arthritis of knee, left M17.12    Dehydration E86.0    GI bleed K92.2    Hyperglycemia R73.9    Hypomagnesemia E83.42    Moderate single current episode of major depressive disorder ([facility]) F32.1    S/P total knee arthroplasty, left T55.720    Sepsis due to Escherichia coli ([facility]) A41.51    UTI (urinary tract infection) N39.0         Assessment:     1. Iron deficiency anemia secondary to inadequate dietary iron intake    2. Anemia due to stage 3 chronic kidney disease ([address]    3. Thrombocytosis (HCC)      Plan:   Iron deficiency Anemia/anemia of chronic kidney disease stage III: I have explained to patient that her WBC for today is not available.  She did completed 4 doses of Venofer on 12/11/2018 and that seems to have help significantly. I have informed patient that she will be treated with Procrit at a dose of 60,000 units every 2-4 weeks whenever the hemoglobin is below 10 g/dL and hematocrit is below 30%. Thrombocytosis: The current platelet count is not available at this time. Follow up in 12 weeks. Orders Placed This Encounter    CBC WITH AUTOMATED DIFF     Standing Status:   Future     Standing Expiration Date:   8/26/2020    FERRITIN     Standing Status:   Future     Standing Expiration Date:   4/46/6202    METABOLIC PANEL, COMPREHENSIVE     Standing Status:   Future     Standing Expiration Date:   8/26/2020    IRON PROFILE     Standing Status:   Future     Standing Expiration Date:   8/26/2020       Tariq Aldridge NP  8/26/2019     I have assessed the patient independently and  agree with the full assessment as outlined.   Maurice Davalos MD, [place]

## 2019-08-26 NOTE — PATIENT INSTRUCTIONS
Iron Deficiency Anemia: Care Instructions  Your Care Instructions    Anemia means that you do not have enough red blood cells. Red blood cells carry oxygen around your body. When you have anemia, it can make you pale, weak, and tired. Many things can cause anemia. The most common cause is loss of blood. This can happen if you have heavy menstrual periods. It can also happen if you have bleeding in your stomach or bowel. You can also get anemia if you don't have enough iron in your diet or if it's hard for your body to absorb iron. In some cases, pregnancy causes anemia. That's because a pregnant woman needs more iron. Your doctor may do more tests to find the cause of your anemia. If a disease or other health problem is causing it, your doctor will treat that problem. It's important to follow up with your doctor to make sure that your iron level returns to normal.  Follow-up care is a key part of your treatment and safety. Be sure to make and go to all appointments, and call your doctor if you are having problems. It's also a good idea to know your test results and keep a list of the medicines you take. How can you care for yourself at home? · If your doctor recommended iron pills, take them as directed. ? Try to take the pills on an empty stomach. You can do this about 1 hour before or 2 hours after meals. But you may need to take iron with food to avoid an upset stomach. ? Do not take antacids or drink milk or anything with caffeine within 2 hours of when you take your iron. They can keep your body from absorbing the iron well. ? Vitamin C helps your body absorb iron. You may want to take iron pills with a glass of orange juice or some other food high in vitamin C.  ? Iron pills may cause stomach problems. These include heartburn, nausea, diarrhea, constipation, and cramps. It can help to drink plenty of fluids and include fruits, vegetables, and fiber in your diet.   ? It's normal for iron pills to make your stool a greenish or grayish black. But internal bleeding can also cause dark stool. So it's important to tell your doctor about any color changes. ? Call your doctor if you think you are having a problem with your iron pills. Even after you start to feel better, it will take several months for your body to build up its supply of iron. ? If you miss a pill, don't take a double dose. ? Keep iron pills out of the reach of small children. Too much iron can be very dangerous. · Eat foods with a lot of iron. These include red meat, shellfish, poultry, and eggs. They also include beans, raisins, whole-grain bread, and leafy green vegetables. · Steam your vegetables. This is the best way to prepare them if you want to get as much iron as possible. · Be safe with medicines. Do not take nonsteroidal anti-inflammatory pain relievers unless your doctor tells you to. These include aspirin, naproxen (Aleve), and ibuprofen (Advil, Motrin). · Liquid iron can stain your teeth. But you can mix it with water or juice and drink it with a straw. Then it won't get on your teeth. When should you call for help? Call 911 anytime you think you may need emergency care. For example, call if:    · You passed out (lost consciousness).    Call your doctor now or seek immediate medical care if:    · You are short of breath.     · You are dizzy or light-headed, or you feel like you may faint.     · You have new or worse bleeding.    Watch closely for changes in your health, and be sure to contact your doctor if:    · You feel weaker or more tired than usual.     · You do not get better as expected. Where can you learn more? Go to http://shawn-stevie.info/. Enter Y402 in the search box to learn more about \"Iron Deficiency Anemia: Care Instructions. \"  Current as of: March 28, 2019  Content Version: 12.1  © 4174-1073 [employer].  Care instructions adapted under license by [employer] (which disclaims liability or warranty for this information). If you have questions about a medical condition or this instruction, always ask your healthcare professional. [address] any warranty or liability for your use of this information.

## 2019-08-27 LAB
FERRITIN SERPL-MCNC: 45 NG/ML (ref 8–388)
IRON SATN MFR SERPL: 18 %
IRON SERPL-MCNC: 56 UG/DL (ref 50–175)
TIBC SERPL-MCNC: 305 UG/DL (ref 250–450)

## 2019-11-25 ENCOUNTER — HOSPITAL ENCOUNTER (OUTPATIENT)
Dept: LAB | Age: 79
Discharge: HOME | End: 2019-11-25
Payer: MEDICARE

## 2019-11-25 ENCOUNTER — HOSPITAL ENCOUNTER (OUTPATIENT)
Dept: ONCOLOGY | Age: 79
Discharge: HOME | End: 2019-11-25

## 2019-11-25 ENCOUNTER — OFFICE VISIT (OUTPATIENT)
Dept: ONCOLOGY | Age: 79
End: 2019-11-25

## 2019-11-25 VITALS
WEIGHT: 187 LBS | RESPIRATION RATE: 16 BRPM | DIASTOLIC BLOOD PRESSURE: 84 MMHG | HEART RATE: 99 BPM | HEIGHT: 64 IN | SYSTOLIC BLOOD PRESSURE: 148 MMHG | BODY MASS INDEX: 31.92 KG/M2 | OXYGEN SATURATION: 99 %

## 2019-11-25 DIAGNOSIS — D75.839 THROMBOCYTOSIS: ICD-10-CM

## 2019-11-25 DIAGNOSIS — D72.9 NEUTROPHILIC LEUKOCYTOSIS: ICD-10-CM

## 2019-11-25 DIAGNOSIS — D50.8 IRON DEFICIENCY ANEMIA SECONDARY TO INADEQUATE DIETARY IRON INTAKE: ICD-10-CM

## 2019-11-25 DIAGNOSIS — N18.30 ANEMIA DUE TO STAGE 3 CHRONIC KIDNEY DISEASE (HCC): ICD-10-CM

## 2019-11-25 DIAGNOSIS — D63.1 ANEMIA DUE TO STAGE 3 CHRONIC KIDNEY DISEASE (HCC): ICD-10-CM

## 2019-11-25 DIAGNOSIS — D50.8 IRON DEFICIENCY ANEMIA SECONDARY TO INADEQUATE DIETARY IRON INTAKE: Primary | ICD-10-CM

## 2019-11-25 PROBLEM — Z16.12 UTI DUE TO EXTENDED-SPECTRUM BETA LACTAMASE (ESBL) PRODUCING ESCHERICHIA COLI: Status: ACTIVE | Noted: 2018-12-09

## 2019-11-25 PROBLEM — B96.29 UTI DUE TO EXTENDED-SPECTRUM BETA LACTAMASE (ESBL) PRODUCING ESCHERICHIA COLI: Status: ACTIVE | Noted: 2018-12-09

## 2019-11-25 PROBLEM — N39.0 UTI DUE TO EXTENDED-SPECTRUM BETA LACTAMASE (ESBL) PRODUCING ESCHERICHIA COLI: Status: ACTIVE | Noted: 2018-12-09

## 2019-11-25 LAB
ALBUMIN SERPL-MCNC: 3.5 G/DL (ref 3.4–5)
ALBUMIN/GLOB SERPL: 0.9 {RATIO} (ref 0.8–1.7)
ALP SERPL-CCNC: 219 U/L (ref 45–117)
ALT SERPL-CCNC: 24 U/L (ref 13–56)
ANION GAP SERPL CALC-SCNC: 4 MMOL/L (ref 3–18)
AST SERPL-CCNC: 22 U/L (ref 10–38)
BASO+EOS+MONOS # BLD AUTO: 1.4 K/UL (ref 0–2.3)
BASO+EOS+MONOS NFR BLD AUTO: 15 % (ref 0.1–17)
BILIRUB SERPL-MCNC: 0.5 MG/DL (ref 0.2–1)
BUN SERPL-MCNC: 22 MG/DL (ref 7–18)
BUN/CREAT SERPL: 15 (ref 12–20)
CALCIUM SERPL-MCNC: 9.3 MG/DL (ref 8.5–10.1)
CHLORIDE SERPL-SCNC: 107 MMOL/L (ref 100–111)
CO2 SERPL-SCNC: 30 MMOL/L (ref 21–32)
CREAT SERPL-MCNC: 1.42 MG/DL (ref 0.6–1.3)
DIFFERENTIAL METHOD BLD: ABNORMAL
ERYTHROCYTE [DISTWIDTH] IN BLOOD BY AUTOMATED COUNT: 14.8 % (ref 11.5–14.5)
GLOBULIN SER CALC-MCNC: 3.7 G/DL (ref 2–4)
GLUCOSE SERPL-MCNC: 91 MG/DL (ref 74–99)
HCT VFR BLD AUTO: 33.8 % (ref 36–48)
HGB BLD-MCNC: 11 G/DL (ref 12–16)
LYMPHOCYTES # BLD: 1.9 K/UL (ref 1.1–5.9)
LYMPHOCYTES NFR BLD: 20 % (ref 14–44)
MCH RBC QN AUTO: 30.1 PG (ref 25–35)
MCHC RBC AUTO-ENTMCNC: 32.5 G/DL (ref 31–37)
MCV RBC AUTO: 92.3 FL (ref 78–102)
NEUTS SEG # BLD: 6.3 K/UL (ref 1.8–9.5)
NEUTS SEG NFR BLD: 66 % (ref 40–70)
PLATELET # BLD AUTO: 264 K/UL (ref 140–440)
POTASSIUM SERPL-SCNC: 4.3 MMOL/L (ref 3.5–5.5)
PROT SERPL-MCNC: 7.2 G/DL (ref 6.4–8.2)
RBC # BLD AUTO: 3.66 M/UL (ref 4.1–5.1)
SODIUM SERPL-SCNC: 141 MMOL/L (ref 136–145)
WBC # BLD AUTO: 9.6 K/UL (ref 4.5–13)

## 2019-11-25 PROCEDURE — 80053 COMPREHEN METABOLIC PANEL: CPT

## 2019-11-25 PROCEDURE — 83540 ASSAY OF IRON: CPT

## 2019-11-25 PROCEDURE — 36415 COLL VENOUS BLD VENIPUNCTURE: CPT

## 2019-11-25 PROCEDURE — 82728 ASSAY OF FERRITIN: CPT

## 2019-11-25 NOTE — PATIENT INSTRUCTIONS
Anemia From Chronic Disease: Care Instructions Your Care Instructions Anemia is a low level of red blood cells. Red blood cells carry oxygen from your lungs to the rest of your body. Sometimes when you have a long-term (chronic) disease, such as kidney disease, arthritis, diabetes, cancer, or an infection, your body does not make enough red blood cells. Follow-up care is a key part of your treatment and safety. Be sure to make and go to all appointments, and call your doctor if you are having problems. It's also a good idea to know your test results and keep a list of the medicines you take. How can you care for yourself at home? · Follow your doctor's instructions to treat the chronic condition that is causing the anemia. · Be safe with medicines. Take your medicine to treat your chronic condition exactly as prescribed. Call your doctor if you think you are having a problem with your medicine. · Take your medicine for anemia exactly as prescribed. Call your doctor if you think you are having a problem with your medicine. Medicines to increase the number of red blood cells (such as epoetin or darbepoetin) may be given as an injection. ? If you miss a dose, take it as soon as you can, unless it is almost time for your next dose. In that case, get back on your regular schedule and take only one dose. ? Do not freeze this medicine. Store it in the refrigerator. Do not shake the bottle before you prepare the shot. · Keep all your appointments for blood tests to check on your hemoglobin levels. When should you call for help? Call 911 anytime you think you may need emergency care. For example, call if: 
  · You passed out (lost consciousness).  
 Call your doctor now or seek immediate medical care if: 
  · You are short of breath.  
  · You are dizzy or light-headed, or you feel like you may faint.  
  · You have new or worse bleeding.  Watch closely for changes in your health, and be sure to contact your doctor if: 
  · You feel weaker or more tired than usual.  
  · You do not get better as expected. Where can you learn more? Go to http://shawn-stevie.info/. Enter E502 in the search box to learn more about \"Anemia From Chronic Disease: Care Instructions. \" Current as of: March 28, 2019 Content Version: 12.2 © 9764-1766 [employer]. Care instructions adapted under license by [employer] (which disclaims liability or warranty for this information). If you have questions about a medical condition or this instruction, always ask your healthcare professional. [address] any warranty or liability for your use of this information.

## 2019-11-25 NOTE — PROGRESS NOTES
Hematology/Oncology  Progress Note    Name: Karen Salinas  Date: 2019  : 1940     Primary Care Provider: Jelena Clement NP      Ms. Eyal Harris is a 78y.o. year old female with severe iron deficiency anemia and anemia of chronic kidney disease     Current Therapy: Venofor as needed (completed on 2018)    Subjective:        Mrs. Eyal Harris is a 20-year-old woman who is  Is being seen for severe anemia. She has previously completed iron therapy with Venofer at a dose of 250 mg every 2 weeks for 4 doses on 2018. She is using a walker for support and mobility. She has no new physical complaints. The patient reports her appetite has been very good lately and she is actually slowly continuing to gain some weight. The past medical, surgical and social history has been reviewed and remains unchanged.    Past Medical History:   Diagnosis Date    Arrhythmia     hx afib    Edema 10/9/2014    sec to dependency, chf, mr, knee surgeries & norvasc reduce salt & fluid as no other Sx now     Essential hypertension     GERD (gastroesophageal reflux disease)     Malignant neoplasm of skin 2010    Murmur, cardiac      Past Surgical History:   Procedure Laterality Date    HX HYSTERECTOMY      HX KNEE REPLACEMENT      HX ORTHOPAEDIC      bilateral feet surgery     Social History     Socioeconomic History    Marital status:      Spouse name: Not on file    Number of children: Not on file    Years of education: Not on file    Highest education level: Not on file   Occupational History    Not on file   Social Needs    Financial resource strain: Not on file    Food insecurity:     Worry: Not on file     Inability: Not on file    Transportation needs:     Medical: Not on file     Non-medical: Not on file   Tobacco Use    Smoking status: Never Smoker    Smokeless tobacco: Never Used   Substance and Sexual Activity    Alcohol use: No    Drug use: No    Sexual activity: Not on file Lifestyle    Physical activity:     Days per week: Not on file     Minutes per session: Not on file    Stress: Not on file   Relationships    Social connections:     Talks on phone: Not on file     Gets together: Not on file     Attends Christianity service: Not on file     Active member of club or organization: Not on file     Attends meetings of clubs or organizations: Not on file     Relationship status: Not on file    Intimate partner violence:     Fear of current or ex partner: Not on file     Emotionally abused: Not on file     Physically abused: Not on file     Forced sexual activity: Not on file   Other Topics Concern    Not on file   Social History Narrative    Not on file     Family History   Problem Relation Age of Onset    Heart Surgery Neg Hx     Heart Attack Neg Hx     Stroke Neg Hx      Current Outpatient Medications   Medication Sig Dispense Refill    ELIQUIS 5 mg tablet       dilTIAZem (TIAZAC) 360 mg ER capsule       mupirocin (BACTROBAN) 2 % ointment       hydroxychloroquine (PLAQUENIL) 200 mg tablet       nitrofurantoin, macrocrystal-monohydrate, (MACROBID) 100 mg capsule       nystatin (MYCOSTATIN) 100,000 unit/mL suspension RINSE WITH 2-5MLS BY MOUTH AND SWALLOW FOUR TIMES A DAY  0    lisinopril (PRINIVIL, ZESTRIL) 5 mg tablet Take  by mouth daily.  potassium chloride (K-DUR, KLOR-CON) 10 mEq tablet Take 1 Tab by mouth daily. 30 Tab 1    naproxen (NAPROSYN) 500 mg tablet Take 500 mg by mouth daily.  fexofenadine (ALLEGRA) 180 mg tablet Take  by mouth daily.  triamcinolone acetonide (KENALOG) 0.1 % ointment Apply  to affected area two (2) times a day. use thin layer      hydrocodone-acetaminophen (NORCO) 5-325 mg per tablet Take 1 tablet by mouth every four (4) hours as needed for Pain.  docusate sodium (COLACE) 100 mg capsule Take 100 mg by mouth daily.  traMADol (ULTRAM) 50 mg tablet Take 50 mg by mouth every six (6) hours as needed for Pain.       amLODIPine (NORVASC) 10 mg tablet 10 mg. Take 1 Tab by Mouth Once a Day.  aspirin 81 mg tablet 81 Tabs. Take 81 mg by Mouth Once a Day.  gabapentin (NEURONTIN) 300 mg capsule 300 mg. Take 1 Cap by Mouth 3 Times Daily.  esomeprazole (NEXIUM) 40 mg capsule Take 1 Cap by Mouth Once a Day.  pramipexole (MIRAPEX) 0.125 mg tablet Take 1 Tab by Mouth 3 Times Daily.  estradiol (ESTRACE) 1 mg tablet Take 1 mg by Mouth Once a Day. Review of Systems  General :The patient has no complaints and there is no physical distress evident. Psychological : patient denies having any psychological symptoms such as hallucinations depression or anxiety. Ophthalmic:the patient denies having any visual impairment or eye discomfort. ENT: there are no abnormalities reported. Allergy and Immunology:the patient denies having any seasonal allergies or allergies to medications other than those already outlined above. Hematological and Lymphatic: the patient denies having any bruising, bleeding or lymphadenopathy. Endocrine: the patient denies having any heat or cold intolerance. There is no history of diabetes or thyroid disorders. Breast: the patient denies having any history of breast mass or lumps. Respiratory:the patient denies having any cough, shortness of breath, or dyspnea on exertion. Cardiovascular: there are no complaints of chest pain, palpitations, chest pounding, or dyspnea on exertion. Gastrointestinal: the patient denies having nausea, emesis, diarrhea, constipation, or blood in the stool. Genito-Urinary: the patient denies having urinary urgency, frequency, or dysuria. Musculoskeletal: with the exception of mild arthralgias the patient has no other musculoskeletal complaints. Neurological:  denies having any numbness, tingling, or neurologic deficits. Dermatological: patient denies having any unexplained rash, skin ulcerations, or hives.     Objective:     Visit Vitals  BP 148/84   Pulse 99   Resp 16   Ht 5' 4\" (1.626 m)   Wt 84.8 kg (187 lb)   SpO2 99%   BMI 32.10 kg/m²     Pain Score: 0    Physical Exam:   ECO  General: Well appearing, in NAD  Skin: examination of the skin reveals no bruising, rash or petechiae  HEENT: Normocephalic, atraumatic. Conjunctiva and sclera are clear. Pupils are equal, round and reactive to light. EOMs are intact. ENT without oral mucosal lesions, stomatitis or thrush  Neck: supple without lymphadenopathy, JVD or thyromegaly  Lymphatics: no palpable cervical, supraclavicular, axillary or inguinal lymphadenopathy  Lungs: clear breath sounds bilaterally, no rhonchi or wheezes noted  Heart: Regular rate and rhythm, no murmurs, rubs or gallops, S1-S2 noted. Positive peripheral pulses bilaterally upper and lower extremities  Abdomen: soft, non-tender, non-distended, no HSM, positive bowel sounds  Extremities: without clubbing, cyanosis or edema  Neurologic: no focal deficits, she is using a walker for support and mobility, Alert and oriented x 3. Psychologic: mood and affect are appropriate, no anxiety or depression noted    Laboratory Data:     Results for orders placed or performed during the hospital encounter of 19   CBC WITH 3 PART DIFF     Status: Abnormal   Result Value Ref Range Status    WBC 9.6 4.5 - 13.0 K/uL Final    RBC 3.66 (L) 4.10 - 5.10 M/uL Final    HGB 11.0 (L) 12.0 - 16 g/dL Final    HCT 33.8 (L) 36 - 48 % Final    MCV 92.3 78 - 102 FL Final    MCH 30.1 25.0 - 35.0 PG Final    MCHC 32.5 31 - 37 g/dL Final    RDW 14.8 (H) 11.5 - 14.5 % Final    PLATELET 152 153 - 919 K/uL Final    NEUTROPHILS 66 40 - 70 % Final    MIXED CELLS 15 0.1 - 17 % Final    LYMPHOCYTES 20 14 - 44 % Final    ABS. NEUTROPHILS 6.3 1.8 - 9.5 K/UL Final    ABS. MIXED CELLS 1.4 0.0 - 2.3 K/uL Final    ABS.  LYMPHOCYTES 1.9 1.1 - 5.9 K/UL Final     Comment: Test performed at [address] [place] [address] or [address] Location. Reviewed by Medical Director. DF AUT[employer]ATED   Final            Patient Active Problem List   Diagnosis Code    Nonspecific abnormal electrocardiogram (ECG) (EKG) R94.31    Essential hypertension, benign I10    Pre-operative cardiovascular examination Z01.810    Undiagnosed cardiac murmurs R01.1    Mitral valve disorders(424.0) I05.9    Edema R60.9    Nausea and vomiting in adult R11.2    Localized osteoarthrosis not specified whether primary or secondary, lower leg M17.10    Hyperkalemia E87.5    GERD (gastroesophageal reflux disease) K21.9    DJD (degenerative joint disease) M19.90    Disorder of bone and cartilage M89.9, V19.1    Diastolic dysfunction with chronic heart failure ([facility]) I50.32    Community acquired pneumonia of left lower lobe of lung ([address] J18.1    Chest tightness R07.89    Chest pain R07.9    CAP (community acquired pneumonia) J18.9    A-fib ([facility]) I48.91    Abdominal pain R10.9    Paroxysmal atrial fibrillation ([facility]) I48.0    Postoperative anemia due to acute blood loss D62    RLS (restless legs syndrome) G25.81    Sepsis ([facility]) A41.9    Malignant neoplasm of skin C44.90    Iron deficiency anemia secondary to inadequate dietary iron intake D50.8    Thrombocytosis ([facility]) R32.9    Neutrophilic leukocytosis F85.9    Acute pain of left shoulder M25.512    MONCHO (acute kidney injury) ([facility]) N17.9    Arthritis of knee, left M17.12    Dehydration E86.0    GI bleed K92.2    Hyperglycemia R73.9    Hypomagnesemia E83.42    Moderate single current episode of major depressive disorder ([facility]) F32.1    S/P total knee arthroplasty, left I39.051    UTI due to extended-spectrum beta lactamase (ESBL) producing Escherichia coli N39.0, B96.29, Z16.12    UTI (urinary tract infection) N39.0    Chronic kidney disease, stage III (moderate) ([facility]) N18.3         Assessment:     1. Iron deficiency anemia secondary to inadequate dietary iron intake    2. Thrombocytosis ([address]    3. Anemia due to stage 3 chronic kidney disease ([facility]    4. Neutrophilic leukocytosis      Plan:   Iron deficiency Anemia/anemia of chronic kidney disease stage III: I have explained to the patient that her CBC from today shows that her WBC count is stable at 9.6, the hemoglobin is 11 g/dL, hematocrit 33.8%, and the platelet count is 605,090. Previously, I informed the patient that she will be treated with Procrit at a dose of 60,000 units every 2-4 weeks whenever the hemoglobin is below 10 g/dL and hematocrit is below 30%. Thrombocytosis: The current platelet count is not available at this time. The current platelet count is actually 264,000. Neutrophilic leukocytosis: This has resolved. The total WBC count is 9.6 with 66% neutrophils and 20% lymphocytes. Follow up in 12 weeks. Follow-up and Dispositions  ·   Return in about 3 months (around 2/25/2020).         Orders Placed This Encounter    COMPLETE CBC & AUTO DIFF WBC    InHouse CBC ([employer])     Standing Status:   Future     Number of Occurrences:   1     Standing Expiration Date:   02/2/9496    METABOLIC PANEL, COMPREHENSIVE     Standing Status:   Future     Standing Expiration Date:   11/25/2020    IRON PROFILE     Standing Status:   Future     Standing Expiration Date:   11/25/2020    FERRITIN     Standing Status:   Future     Standing Expiration Date:   11/25/2020       Kishore Valencia MD  11/25/2019

## 2019-11-26 LAB
FERRITIN SERPL-MCNC: 42 NG/ML (ref 8–388)
IRON SATN MFR SERPL: 22 %
IRON SERPL-MCNC: 62 UG/DL (ref 50–175)
TIBC SERPL-MCNC: 288 UG/DL (ref 250–450)

## 2020-02-24 ENCOUNTER — HOSPITAL ENCOUNTER (OUTPATIENT)
Dept: LAB | Age: 80
Discharge: HOME | End: 2020-02-24
Payer: MEDICARE

## 2020-02-24 ENCOUNTER — OFFICE VISIT (OUTPATIENT)
Dept: ONCOLOGY | Age: 80
End: 2020-02-24

## 2020-02-24 DIAGNOSIS — D75.839 THROMBOCYTOSIS: ICD-10-CM

## 2020-02-24 DIAGNOSIS — N18.30 ANEMIA DUE TO STAGE 3 CHRONIC KIDNEY DISEASE (HCC): ICD-10-CM

## 2020-02-24 DIAGNOSIS — D50.8 IRON DEFICIENCY ANEMIA SECONDARY TO INADEQUATE DIETARY IRON INTAKE: ICD-10-CM

## 2020-02-24 DIAGNOSIS — D63.1 ANEMIA DUE TO STAGE 3 CHRONIC KIDNEY DISEASE (HCC): ICD-10-CM

## 2020-02-24 DIAGNOSIS — D72.9 NEUTROPHILIC LEUKOCYTOSIS: ICD-10-CM

## 2020-02-24 DIAGNOSIS — D50.8 IRON DEFICIENCY ANEMIA SECONDARY TO INADEQUATE DIETARY IRON INTAKE: Primary | ICD-10-CM

## 2020-02-24 LAB
ALBUMIN SERPL-MCNC: 3.2 G/DL (ref 3.4–5)
ALBUMIN/GLOB SERPL: 0.8 {RATIO} (ref 0.8–1.7)
ALP SERPL-CCNC: 211 U/L (ref 45–117)
ALT SERPL-CCNC: 21 U/L (ref 13–56)
ANION GAP SERPL CALC-SCNC: 6 MMOL/L (ref 3–18)
AST SERPL-CCNC: 16 U/L (ref 10–38)
BASOPHILS # BLD: 0.1 K/UL (ref 0–0.1)
BASOPHILS NFR BLD: 1 % (ref 0–2)
BILIRUB SERPL-MCNC: 0.4 MG/DL (ref 0.2–1)
BUN SERPL-MCNC: 21 MG/DL (ref 7–18)
BUN/CREAT SERPL: 17 (ref 12–20)
CALCIUM SERPL-MCNC: 8.7 MG/DL (ref 8.5–10.1)
CHLORIDE SERPL-SCNC: 107 MMOL/L (ref 100–111)
CO2 SERPL-SCNC: 29 MMOL/L (ref 21–32)
CREAT SERPL-MCNC: 1.25 MG/DL (ref 0.6–1.3)
DIFFERENTIAL METHOD BLD: ABNORMAL
EOSINOPHIL # BLD: 0.2 K/UL (ref 0–0.4)
EOSINOPHIL NFR BLD: 2 % (ref 0–5)
ERYTHROCYTE [DISTWIDTH] IN BLOOD BY AUTOMATED COUNT: 15.7 % (ref 11.6–14.5)
FERRITIN SERPL-MCNC: 43 NG/ML (ref 8–388)
GLOBULIN SER CALC-MCNC: 3.9 G/DL (ref 2–4)
GLUCOSE SERPL-MCNC: 96 MG/DL (ref 74–99)
HCT VFR BLD AUTO: 37 % (ref 35–45)
HGB BLD-MCNC: 11.6 G/DL (ref 12–16)
IRON SATN MFR SERPL: 20 % (ref 20–50)
IRON SERPL-MCNC: 64 UG/DL (ref 50–175)
LYMPHOCYTES # BLD: 2.2 K/UL (ref 0.9–3.6)
LYMPHOCYTES NFR BLD: 21 % (ref 21–52)
MCH RBC QN AUTO: 29.1 PG (ref 24–34)
MCHC RBC AUTO-ENTMCNC: 31.4 G/DL (ref 31–37)
MCV RBC AUTO: 92.7 FL (ref 74–97)
MONOCYTES # BLD: 0.7 K/UL (ref 0.05–1.2)
MONOCYTES NFR BLD: 7 % (ref 3–10)
NEUTS SEG # BLD: 7.2 K/UL (ref 1.8–8)
NEUTS SEG NFR BLD: 69 % (ref 40–73)
PLATELET # BLD AUTO: 330 K/UL (ref 135–420)
PMV BLD AUTO: 9.8 FL (ref 9.2–11.8)
POTASSIUM SERPL-SCNC: 4.6 MMOL/L (ref 3.5–5.5)
PROT SERPL-MCNC: 7.1 G/DL (ref 6.4–8.2)
RBC # BLD AUTO: 3.99 M/UL (ref 4.2–5.3)
SODIUM SERPL-SCNC: 142 MMOL/L (ref 136–145)
TIBC SERPL-MCNC: 314 UG/DL (ref 250–450)
WBC # BLD AUTO: 10.3 K/UL (ref 4.6–13.2)

## 2020-02-24 PROCEDURE — 80053 COMPREHEN METABOLIC PANEL: CPT

## 2020-02-24 PROCEDURE — 36415 COLL VENOUS BLD VENIPUNCTURE: CPT

## 2020-02-24 PROCEDURE — 83540 ASSAY OF IRON: CPT

## 2020-02-24 PROCEDURE — 85025 COMPLETE CBC W/AUTO DIFF WBC: CPT

## 2020-02-24 PROCEDURE — 82728 ASSAY OF FERRITIN: CPT

## 2020-02-24 NOTE — PROGRESS NOTES
Hematology/Oncology  Progress Note    Name: Kiki Nieves  Date: 2020  : 1940     Primary Care Provider: Mitali Barros NP      Ms. Jing Sanchez is a 78y.o. year old female with severe iron deficiency anemia and anemia of chronic kidney disease     Current Therapy: Venofor as needed (completed on 2018)    Subjective:        Mrs. Jing Sanchez is a 57-year-old woman who is  Is being seen for severe anemia. She has previously completed iron therapy with Venofer at a dose of 250 mg every 2 weeks for 4 doses on 2018. She is using a walker for support and mobility. She informed me that she is continuing to have occasional urinary tract infections but her primary care physician is treating her for this problem. She has no new physical complaints. The patient reports her appetite has been very good lately and she is actually slowly continuing to gain some weight. The past medical, surgical and social history has been reviewed and remains unchanged.    Past Medical History:   Diagnosis Date    Arrhythmia     hx afib    Edema 10/9/2014    sec to dependency, chf, mr, knee surgeries & norvasc reduce salt & fluid as no other Sx now     Essential hypertension     GERD (gastroesophageal reflux disease)     Malignant neoplasm of skin 2010    Murmur, cardiac      Past Surgical History:   Procedure Laterality Date    HX HYSTERECTOMY      HX KNEE REPLACEMENT      HX ORTHOPAEDIC      bilateral feet surgery     Social History     Socioeconomic History    Marital status:      Spouse name: Not on file    Number of children: Not on file    Years of education: Not on file    Highest education level: Not on file   Occupational History    Not on file   Social Needs    Financial resource strain: Not on file    Food insecurity:     Worry: Not on file     Inability: Not on file    Transportation needs:     Medical: Not on file     Non-medical: Not on file   Tobacco Use    Smoking status: Never Smoker    Smokeless tobacco: Never Used   Substance and Sexual Activity    Alcohol use: No    Drug use: No    Sexual activity: Not on file   Lifestyle    Physical activity:     Days per week: Not on file     Minutes per session: Not on file    Stress: Not on file   Relationships    Social connections:     Talks on phone: Not on file     Gets together: Not on file     Attends Buddhism service: Not on file     Active member of club or organization: Not on file     Attends meetings of clubs or organizations: Not on file     Relationship status: Not on file    Intimate partner violence:     Fear of current or ex partner: Not on file     Emotionally abused: Not on file     Physically abused: Not on file     Forced sexual activity: Not on file   Other Topics Concern    Not on file   Social History Narrative    Not on file     Family History   Problem Relation Age of Onset    Heart Surgery Neg Hx     Heart Attack Neg Hx     Stroke Neg Hx      Current Outpatient Medications   Medication Sig Dispense Refill    ELIQUIS 5 mg tablet       dilTIAZem (TIAZAC) 360 mg ER capsule       mupirocin (BACTROBAN) 2 % ointment       hydroxychloroquine (PLAQUENIL) 200 mg tablet       nitrofurantoin, macrocrystal-monohydrate, (MACROBID) 100 mg capsule       nystatin (MYCOSTATIN) 100,000 unit/mL suspension RINSE WITH 2-5MLS BY MOUTH AND SWALLOW FOUR TIMES A DAY  0    lisinopril (PRINIVIL, ZESTRIL) 5 mg tablet Take  by mouth daily.  potassium chloride (K-DUR, KLOR-CON) 10 mEq tablet Take 1 Tab by mouth daily. 30 Tab 1    naproxen (NAPROSYN) 500 mg tablet Take 500 mg by mouth daily.  fexofenadine (ALLEGRA) 180 mg tablet Take  by mouth daily.  triamcinolone acetonide (KENALOG) 0.1 % ointment Apply  to affected area two (2) times a day. use thin layer      hydrocodone-acetaminophen (NORCO) 5-325 mg per tablet Take 1 tablet by mouth every four (4) hours as needed for Pain.       docusate sodium (COLACE) 100 mg capsule Take 100 mg by mouth daily.  traMADol (ULTRAM) 50 mg tablet Take 50 mg by mouth every six (6) hours as needed for Pain.  amLODIPine (NORVASC) 10 mg tablet 10 mg. Take 1 Tab by Mouth Once a Day.  aspirin 81 mg tablet 81 Tabs. Take 81 mg by Mouth Once a Day.  gabapentin (NEURONTIN) 300 mg capsule 300 mg. Take 1 Cap by Mouth 3 Times Daily.  esomeprazole (NEXIUM) 40 mg capsule Take 1 Cap by Mouth Once a Day.  pramipexole (MIRAPEX) 0.125 mg tablet Take 1 Tab by Mouth 3 Times Daily.  estradiol (ESTRACE) 1 mg tablet Take 1 mg by Mouth Once a Day. Review of Systems  General :The patient has no complaints and there is no physical distress evident. Psychological : patient denies having any psychological symptoms such as hallucinations depression or anxiety. Ophthalmic:the patient denies having any visual impairment or eye discomfort. ENT: there are no abnormalities reported. Allergy and Immunology:the patient denies having any seasonal allergies or allergies to medications other than those already outlined above. Hematological and Lymphatic: the patient denies having any bruising, bleeding or lymphadenopathy. Endocrine: the patient denies having any heat or cold intolerance. There is no history of diabetes or thyroid disorders. Breast: the patient denies having any history of breast mass or lumps. Respiratory:the patient denies having any cough, shortness of breath, or dyspnea on exertion. Cardiovascular: there are no complaints of chest pain, palpitations, chest pounding, or dyspnea on exertion. Gastrointestinal: the patient denies having nausea, emesis, diarrhea, constipation, or blood in the stool. Genito-Urinary: the patient denies having urinary urgency, frequency, or dysuria. Musculoskeletal: with the exception of mild arthralgias the patient has no other musculoskeletal complaints.    Neurological:  denies having any numbness, tingling, or neurologic deficits. Dermatological: patient denies having any unexplained rash, skin ulcerations, or hives. Objective: There were no vitals taken for this visit. Pain Score: 0    Physical Exam:   ECO  General: Well appearing, in NAD  Skin: examination of the skin reveals no bruising, rash or petechiae  HEENT: Normocephalic, atraumatic. Conjunctiva and sclera are clear. Pupils are equal, round and reactive to light. EOMs are intact. ENT without oral mucosal lesions, stomatitis or thrush  Neck: supple without lymphadenopathy, JVD or thyromegaly  Lymphatics: no palpable cervical, supraclavicular, axillary or inguinal lymphadenopathy  Lungs: clear breath sounds bilaterally, no rhonchi or wheezes noted  Heart: Regular rate and rhythm, no murmurs, rubs or gallops, S1-S2 noted. Positive peripheral pulses bilaterally upper and lower extremities  Abdomen: soft, non-tender, non-distended, no HSM, positive bowel sounds  Extremities: without clubbing, cyanosis or edema  Neurologic: no focal deficits, she is using a walker for support and mobility, Alert and oriented x 3. Psychologic: mood and affect are appropriate, no anxiety or depression noted    Laboratory Data:     Results for orders placed or performed during the hospital encounter of 19   CBC WITH 3 PART DIFF     Status: Abnormal   Result Value Ref Range Status    WBC 9.6 4.5 - 13.0 K/uL Final    RBC 3.66 (L) 4.10 - 5.10 M/uL Final    HGB 11.0 (L) 12.0 - 16 g/dL Final    HCT 33.8 (L) 36 - 48 % Final    MCV 92.3 78 - 102 FL Final    MCH 30.1 25.0 - 35.0 PG Final    MCHC 32.5 31 - 37 g/dL Final    RDW 14.8 (H) 11.5 - 14.5 % Final    PLATELET 301 630 - 095 K/uL Final    NEUTROPHILS 66 40 - 70 % Final    MIXED CELLS 15 0.1 - 17 % Final    LYMPHOCYTES 20 14 - 44 % Final    ABS. NEUTROPHILS 6.3 1.8 - 9.5 K/UL Final    ABS. MIXED CELLS 1.4 0.0 - 2.3 K/uL Final    ABS.  LYMPHOCYTES 1.9 1.1 - 5.9 K/UL Final     Comment: Test performed at [facility] [address] Oncology or Outpatient Infusion Center Location. Reviewed by Medical Director. DF AUTOMATED   Final            Patient Active Problem List   Diagnosis Code    Nonspecific abnormal electrocardiogram (ECG) (EKG) R94.31    Essential hypertension, benign I10    Pre-operative cardiovascular examination Z01.810    Undiagnosed cardiac murmurs R01.1    Mitral valve disorders(424.0) I05.9    Edema R60.9    Nausea and vomiting in adult R11.2    Localized osteoarthrosis not specified whether primary or secondary, lower leg M17.10    Hyperkalemia E87.5    GERD (gastroesophageal reflux disease) K21.9    DJD (degenerative joint disease) M19.90    Disorder of bone and cartilage M89.9, W59.3    Diastolic dysfunction with chronic heart failure ([facility]) I50.32    Community acquired pneumonia of left lower lobe of lung ([facility] [address] J18.1    Chest tightness R07.89    Chest pain R07.9    CAP (community acquired pneumonia) J18.9    A-fib ([facility]) I48.91    Abdominal pain R10.9    Paroxysmal atrial fibrillation ([facility]) I48.0    Postoperative anemia due to acute blood loss D62    RLS (restless legs syndrome) G25.81    Sepsis ([facility]) A41.9    Malignant neoplasm of skin C44.90    Iron deficiency anemia secondary to inadequate dietary iron intake D50.8    Thrombocytosis ([facility]) D68.1    Neutrophilic leukocytosis J85.7    Acute pain of left shoulder M25.512    MONCHO (acute kidney injury) ([facility]) N17.9    Arthritis of knee, left M17.12    Dehydration E86.0    GI bleed K92.2    Hyperglycemia R73.9    Hypomagnesemia E83.42    Moderate single current episode of major depressive disorder ([facility]) F32.1    S/P total knee arthroplasty, left B77.003    UTI due to extended-spectrum beta lactamase (ESBL) producing Escherichia coli N39.0, B96.29, Z16.12    UTI (urinary tract infection) N39.0    Chronic kidney disease, stage III (moderate) ([facility]) N18.3         Assessment:     1.  Iron deficiency anemia secondary to inadequate dietary iron intake    2. Anemia due to stage 3 chronic kidney disease ([address]    3. Neutrophilic leukocytosis    4. Thrombocytosis ([address]      Plan:   Iron deficiency Anemia/anemia of chronic kidney disease stage III: I have explained to the patient that her CBC from today pending. The most recent CBC from 11/25/2019 showed that her WBC count is stable at 9.6, the hemoglobin was 11 g/dL, hematocrit 33.8%, and the platelet count was 145,305. Previously, I informed the patient that she will be treated with Procrit at a dose of 60,000 units every 2-4 weeks whenever the hemoglobin is below 10 g/dL and hematocrit is below 30%. Thrombocytosis: The current platelet count is not available at this time. The most recent platelet count was actually 264,000. Neutrophilic leukocytosis: This has resolved. The total WBC count from 11/25/2018 was 9.6 with 66% neutrophils and 20% lymphocytes. The CBC from today is pending. Follow up in 12 weeks. Follow-up and Dispositions  ·   Return in about 3 months (around 5/24/2020).         Orders Placed This Encounter    CBC WITH AUTOMATED DIFF     Standing Status:   Future     Standing Expiration Date:   2/16/5463    METABOLIC PANEL, COMPREHENSIVE     Standing Status:   Future     Standing Expiration Date:   2/24/2021    IRON PROFILE     Standing Status:   Future     Standing Expiration Date:   2/24/2021    FERRITIN     Standing Status:   Future     Standing Expiration Date:   2/24/2021       Joe Sanford MD  2/24/2020

## 2020-02-24 NOTE — PATIENT INSTRUCTIONS
Anemia From Chronic Disease: Care Instructions  Your Care Instructions    Anemia is a low level of red blood cells. Red blood cells carry oxygen from your lungs to the rest of your body. Sometimes when you have a long-term (chronic) disease, such as kidney disease, arthritis, diabetes, cancer, or an infection, your body does not make enough red blood cells. Follow-up care is a key part of your treatment and safety. Be sure to make and go to all appointments, and call your doctor if you are having problems. It's also a good idea to know your test results and keep a list of the medicines you take. How can you care for yourself at home? · Follow your doctor's instructions to treat the chronic condition that is causing the anemia. · Be safe with medicines. Take your medicine to treat your chronic condition exactly as prescribed. Call your doctor if you think you are having a problem with your medicine. · Take your medicine for anemia exactly as prescribed. Call your doctor if you think you are having a problem with your medicine. Medicines to increase the number of red blood cells (such as epoetin or darbepoetin) may be given as an injection. ? If you miss a dose, take it as soon as you can, unless it is almost time for your next dose. In that case, get back on your regular schedule and take only one dose. ? Do not freeze this medicine. Store it in the refrigerator. Do not shake the bottle before you prepare the shot. · Keep all your appointments for blood tests to check on your hemoglobin levels. When should you call for help? Call 911 anytime you think you may need emergency care.  For example, call if:    · You passed out (lost consciousness).    Call your doctor now or seek immediate medical care if:    · You are short of breath.     · You are dizzy or light-headed, or you feel like you may faint.     · You have new or worse bleeding.    Watch closely for changes in your health, and be sure to contact your doctor if:    · You feel weaker or more tired than usual.     · You do not get better as expected. Where can you learn more? Go to http://shawn-stevie.info/. Enter E502 in the search box to learn more about \"Anemia From Chronic Disease: Care Instructions. \"  Current as of: March 28, 2019  Content Version: 12.2  © 9573-4046 [employer]. Care instructions adapted under license by [employer] (which disclaims liability or warranty for this information). If you have questions about a medical condition or this instruction, always ask your healthcare professional. [place] [address] any warranty or liability for your use of this information.

## 2020-09-03 ENCOUNTER — HOSPITAL ENCOUNTER (OUTPATIENT)
Dept: LAB | Age: 80
Discharge: HOME | End: 2020-09-03
Payer: MEDICARE

## 2020-09-03 ENCOUNTER — OFFICE VISIT (OUTPATIENT)
Dept: ONCOLOGY | Age: 80
End: 2020-09-03

## 2020-09-03 VITALS — WEIGHT: 197 LBS | BODY MASS INDEX: 33.63 KG/M2 | HEIGHT: 64 IN | RESPIRATION RATE: 16 BRPM | OXYGEN SATURATION: 92 %

## 2020-09-03 DIAGNOSIS — N18.30 ANEMIA DUE TO STAGE 3 CHRONIC KIDNEY DISEASE (HCC): ICD-10-CM

## 2020-09-03 DIAGNOSIS — D50.8 IRON DEFICIENCY ANEMIA SECONDARY TO INADEQUATE DIETARY IRON INTAKE: ICD-10-CM

## 2020-09-03 DIAGNOSIS — D75.839 THROMBOCYTOSIS: ICD-10-CM

## 2020-09-03 DIAGNOSIS — D72.9 NEUTROPHILIC LEUKOCYTOSIS: ICD-10-CM

## 2020-09-03 DIAGNOSIS — D50.8 IRON DEFICIENCY ANEMIA SECONDARY TO INADEQUATE DIETARY IRON INTAKE: Primary | ICD-10-CM

## 2020-09-03 DIAGNOSIS — D63.1 ANEMIA DUE TO STAGE 3 CHRONIC KIDNEY DISEASE (HCC): ICD-10-CM

## 2020-09-03 PROBLEM — R54 FRAILTY: Status: ACTIVE | Noted: 2020-09-03

## 2020-09-03 PROBLEM — R80.9 TYPE 2 DIABETES MELLITUS WITH MICROALBUMINURIA, WITHOUT LONG-TERM CURRENT USE OF INSULIN (HCC): Status: ACTIVE | Noted: 2018-05-01

## 2020-09-03 PROBLEM — M06.9 RHEUMATOID ARTHRITIS (HCC): Status: ACTIVE | Noted: 2020-08-01

## 2020-09-03 PROBLEM — E11.29 TYPE 2 DIABETES MELLITUS WITH MICROALBUMINURIA, WITHOUT LONG-TERM CURRENT USE OF INSULIN (HCC): Status: ACTIVE | Noted: 2018-05-01

## 2020-09-03 PROBLEM — R53.81 DEBILITY: Status: ACTIVE | Noted: 2020-09-03

## 2020-09-03 LAB
ALBUMIN SERPL-MCNC: 3 G/DL (ref 3.4–5)
ALBUMIN/GLOB SERPL: 0.8 {RATIO} (ref 0.8–1.7)
ALP SERPL-CCNC: 164 U/L (ref 45–117)
ALT SERPL-CCNC: 15 U/L (ref 13–56)
ANION GAP SERPL CALC-SCNC: 9 MMOL/L (ref 3–18)
AST SERPL-CCNC: 12 U/L (ref 10–38)
BASOPHILS # BLD: 0 K/UL (ref 0–0.1)
BASOPHILS NFR BLD: 0 % (ref 0–2)
BILIRUB SERPL-MCNC: 0.3 MG/DL (ref 0.2–1)
BUN SERPL-MCNC: 18 MG/DL (ref 7–18)
BUN/CREAT SERPL: 10 (ref 12–20)
CALCIUM SERPL-MCNC: 8.6 MG/DL (ref 8.5–10.1)
CHLORIDE SERPL-SCNC: 103 MMOL/L (ref 100–111)
CO2 SERPL-SCNC: 29 MMOL/L (ref 21–32)
CREAT SERPL-MCNC: 1.81 MG/DL (ref 0.6–1.3)
DIFFERENTIAL METHOD BLD: ABNORMAL
EOSINOPHIL # BLD: 0.4 K/UL (ref 0–0.4)
EOSINOPHIL NFR BLD: 4 % (ref 0–5)
ERYTHROCYTE [DISTWIDTH] IN BLOOD BY AUTOMATED COUNT: 16 % (ref 11.6–14.5)
FERRITIN SERPL-MCNC: 10 NG/ML (ref 8–388)
GLOBULIN SER CALC-MCNC: 4 G/DL (ref 2–4)
GLUCOSE SERPL-MCNC: 111 MG/DL (ref 74–99)
HCT VFR BLD AUTO: 24.1 % (ref 35–45)
HGB BLD-MCNC: 7.2 G/DL (ref 12–16)
IRON SATN MFR SERPL: 4 % (ref 20–50)
IRON SERPL-MCNC: 13 UG/DL (ref 50–175)
LYMPHOCYTES # BLD: 1.5 K/UL (ref 0.9–3.6)
LYMPHOCYTES NFR BLD: 15 % (ref 21–52)
MCH RBC QN AUTO: 25.6 PG (ref 24–34)
MCHC RBC AUTO-ENTMCNC: 29.9 G/DL (ref 31–37)
MCV RBC AUTO: 85.8 FL (ref 74–97)
MONOCYTES # BLD: 0.9 K/UL (ref 0.05–1.2)
MONOCYTES NFR BLD: 9 % (ref 3–10)
NEUTS SEG # BLD: 7.2 K/UL (ref 1.8–8)
NEUTS SEG NFR BLD: 72 % (ref 40–73)
PLATELET # BLD AUTO: 343 K/UL (ref 135–420)
PMV BLD AUTO: 9.3 FL (ref 9.2–11.8)
POTASSIUM SERPL-SCNC: 3.2 MMOL/L (ref 3.5–5.5)
PROT SERPL-MCNC: 7 G/DL (ref 6.4–8.2)
RBC # BLD AUTO: 2.81 M/UL (ref 4.2–5.3)
SODIUM SERPL-SCNC: 141 MMOL/L (ref 136–145)
TIBC SERPL-MCNC: 328 UG/DL (ref 250–450)
WBC # BLD AUTO: 9.9 K/UL (ref 4.6–13.2)

## 2020-09-03 PROCEDURE — 83540 ASSAY OF IRON: CPT

## 2020-09-03 PROCEDURE — 36415 COLL VENOUS BLD VENIPUNCTURE: CPT

## 2020-09-03 PROCEDURE — 85025 COMPLETE CBC W/AUTO DIFF WBC: CPT

## 2020-09-03 PROCEDURE — 82728 ASSAY OF FERRITIN: CPT

## 2020-09-03 PROCEDURE — 80053 COMPREHEN METABOLIC PANEL: CPT

## 2020-09-03 NOTE — PROGRESS NOTES
Hematology/Oncology  Progress Note    Name: Ritu Tate  Date: 2020  : 1940     Primary Care Provider: Lori Valente NP      Ms. Cristiana Stearns is a 78y.o. year old female with severe iron deficiency anemia and anemia of chronic kidney disease     Current Therapy: Venofor as needed (completed on 2018)    Subjective:      Mrs. Cristiana Stearns is a 58-year-old woman who is  being seen for severe anemia. She has previously completed iron therapy with Venofer at a dose of 250 mg every 2 weeks for 4 doses on 2018. She is using a walker for support and mobility. She informed me that she is continuing to have occasional urinary tract infections but her primary care physician is treating her for this problem and was recently in the hospital and treated for treatment. She has no new physical complaints except fatigue. The patient reports her appetite is stable. She denies any bleeding or blood loss. The past medical, surgical and social history has been reviewed and remains unchanged.    Past Medical History:   Diagnosis Date    Arrhythmia     hx afib    Edema 10/9/2014    sec to dependency, chf, mr, knee surgeries & norvasc reduce salt & fluid as no other Sx now     Essential hypertension     GERD (gastroesophageal reflux disease)     Malignant neoplasm of skin 2010    Murmur, cardiac     Type 2 diabetes mellitus with microalbuminuria, without long-term current use of insulin [facility] 2018     Past Surgical History:   Procedure Laterality Date    HX HYSTERECTOMY      HX KNEE REPLACEMENT      HX ORTHOPAEDIC      bilateral feet surgery     Social History     Socioeconomic History    Marital status:      Spouse name: Not on file    Number of children: Not on file    Years of education: Not on file    Highest education level: Not on file   Occupational History    Not on file   Social Needs    Financial resource strain: Not on file    Food insecurity     Worry: Not on file Inability: Not on file    Transportation needs     Medical: Not on file     Non-medical: Not on file   Tobacco Use    Smoking status: Never Smoker    Smokeless tobacco: Never Used   Substance and Sexual Activity    Alcohol use: No    Drug use: No    Sexual activity: Not on file   Lifestyle    Physical activity     Days per week: Not on file     Minutes per session: Not on file    Stress: Not on file   Relationships    Social connections     Talks on phone: Not on file     Gets together: Not on file     Attends [employer] service: Not on file     Active member of club or organization: Not on file     Attends meetings of clubs or organizations: Not on file     Relationship status: Not on file    Intimate partner violence     Fear of current or ex partner: Not on file     Emotionally abused: Not on file     Physically abused: Not on file     Forced sexual activity: Not on file   Other Topics Concern    Not on file   Social History Narrative    Not on file     Family History   Problem Relation Age of Onset    Heart Surgery Neg Hx     Heart Attack Neg Hx     Stroke Neg Hx      Current Outpatient Medications   Medication Sig Dispense Refill    nitrofurantoin, macrocrystal-monohydrate, (MACROBID) 100 mg capsule Take 1 Cap by mouth daily. 90 Cap 1    nitrofurantoin, macrocrystal-monohydrate, (MACROBID) 100 mg capsule Take 1 Cap by mouth daily. 90 Cap 1    ELIQUIS 5 mg tablet       dilTIAZem (TIAZAC) 360 mg ER capsule       mupirocin (BACTROBAN) 2 % ointment       hydroxychloroquine (PLAQUENIL) 200 mg tablet       nystatin (MYCOSTATIN) 100,000 unit/mL suspension RINSE WITH 2-5MLS BY MOUTH AND SWALLOW FOUR TIMES A DAY  0    lisinopril (PRINIVIL, ZESTRIL) 5 mg tablet Take  by mouth daily.  potassium chloride (K-DUR, KLOR-CON) 10 mEq tablet Take 1 Tab by mouth daily. 30 Tab 1    naproxen (NAPROSYN) 500 mg tablet Take 500 mg by mouth daily.       fexofenadine (ALLEGRA) 180 mg tablet Take  by mouth daily.      triamcinolone acetonide (KENALOG) 0.1 % ointment Apply  to affected area two (2) times a day. use thin layer      hydrocodone-acetaminophen (NORCO) 5-325 mg per tablet Take 1 tablet by mouth every four (4) hours as needed for Pain.  docusate sodium (COLACE) 100 mg capsule Take 100 mg by mouth daily.  traMADol (ULTRAM) 50 mg tablet Take 50 mg by mouth every six (6) hours as needed for Pain.  amLODIPine (NORVASC) 10 mg tablet 10 mg. Take 1 Tab by Mouth Once a Day.  aspirin 81 mg tablet 81 Tabs. Take 81 mg by Mouth Once a Day.  gabapentin (NEURONTIN) 300 mg capsule 300 mg. Take 1 Cap by Mouth 3 Times Daily.  esomeprazole (NEXIUM) 40 mg capsule Take 1 Cap by Mouth Once a Day.  pramipexole (MIRAPEX) 0.125 mg tablet Take 1 Tab by Mouth 3 Times Daily.  estradiol (ESTRACE) 1 mg tablet Take 1 mg by Mouth Once a Day. Review of Systems  General :The patient has no complaints and there is no physical distress evident. Psychological : patient denies having any psychological symptoms such as hallucinations depression or anxiety. Ophthalmic:the patient denies having any visual impairment or eye discomfort. ENT: there are no abnormalities reported. Allergy and Immunology:the patient denies having any seasonal allergies or allergies to medications other than those already outlined above. Hematological and Lymphatic: the patient denies having any bruising, bleeding or lymphadenopathy. Endocrine: the patient denies having any heat or cold intolerance. There is no history of diabetes or thyroid disorders. Breast: the patient denies having any history of breast mass or lumps. Respiratory:the patient denies having any cough, shortness of breath, or dyspnea on exertion. Cardiovascular: there are no complaints of chest pain, palpitations, chest pounding, or dyspnea on exertion.    Gastrointestinal: the patient denies having nausea, emesis, diarrhea, constipation, or blood in the stool. Genito-Urinary: the patient denies having urinary urgency, frequency, or dysuria. Musculoskeletal: with the exception of mild arthralgias the patient has no other musculoskeletal complaints. Neurological:  denies having any numbness, tingling, or neurologic deficits. Dermatological: patient denies having any unexplained rash, skin ulcerations, or hives. Objective:     Visit Vitals  BP (P) 124/54   Pulse (P) 93   Resp 16   Ht 5' 4\" (1.626 m)   Wt 89.4 kg (197 lb)   SpO2 92%   BMI 33.81 kg/m²     Pain Score: 0    Physical Exam:   ECO  General: Well appearing, in NAD  Skin: examination of the skin reveals no bruising, rash or petechiae  HEENT: Normocephalic, atraumatic. Conjunctiva and sclera are clear. Pupils are equal, round and reactive to light. EOMs are intact. ENT without oral mucosal lesions, stomatitis or thrush  Neck: supple without lymphadenopathy, JVD or thyromegaly  Lymphatics: no palpable cervical, supraclavicular, axillary or inguinal lymphadenopathy  Lungs: clear breath sounds bilaterally, no rhonchi or wheezes noted  Heart: Regular rate and rhythm, no murmurs, rubs or gallops, S1-S2 noted. Positive peripheral pulses bilaterally upper and lower extremities  Abdomen: soft, non-tender, non-distended, no HSM, positive bowel sounds  Extremities: without clubbing, cyanosis or edema  Neurologic: no focal deficits, she is using a walker for support and mobility, Alert and oriented x 3.   Psychologic: mood and affect are appropriate, no anxiety or depression noted    Laboratory Data:     Results for orders placed or performed during the hospital encounter of 19   CBC WITH 3 PART DIFF     Status: Abnormal   Result Value Ref Range Status    WBC 9.6 4.5 - 13.0 K/uL Final    RBC 3.66 (L) 4.10 - 5.10 M/uL Final    HGB 11.0 (L) 12.0 - 16 g/dL Final    HCT 33.8 (L) 36 - 48 % Final    MCV 92.3 78 - 102 FL Final    MCH 30.1 25.0 - 35.0 PG Final    MCHC 32.5 31 - 37 g/dL Final    RDW 14.8 (H) 11.5 - 14.5 % Final    PLATELET 837 769 - 921 K/uL Final    NEUTROPHILS 66 40 - 70 % Final    MIXED CELLS 15 0.1 - 17 % Final    LYMPHOCYTES 20 14 - 44 % Final    ABS. NEUTROPHILS 6.3 1.8 - 9.5 K/UL Final    ABS. MIXED CELLS 1.4 0.0 - 2.3 K/uL Final    ABS. LYMPHOCYTES 1.9 1.1 - 5.9 K/UL Final     Comment: Test performed at [address] [place] [address] or Outpatient Infusion Center Location. Reviewed by Medical Director.     DF AUTOMATED   Final            Patient Active Problem List   Diagnosis Code    Nonspecific abnormal electrocardiogram (ECG) (EKG) R94.31    Essential hypertension, benign I10    Pre-operative cardiovascular examination Z01.810    Undiagnosed cardiac murmurs R01.1    Mitral valve disorders(424.0) I05.9    Edema R60.9    Nausea and vomiting in adult R11.2    Localized osteoarthrosis not specified whether primary or secondary, lower leg M17.10    Hyperkalemia E87.5    GERD (gastroesophageal reflux disease) K21.9    DJD (degenerative joint disease) M19.90    Disorder of bone and cartilage M89.9, K36.3    Diastolic dysfunction with chronic heart failure ([facility]) I50.32    Community acquired pneumonia of left lower lobe of lung J18.9    Chest tightness R07.89    Chest pain R07.9    CAP (community acquired pneumonia) J18.9    A-fib ([facility]) I48.91    Abdominal pain R10.9    Paroxysmal atrial fibrillation ([facility]) I48.0    Postoperative anemia due to acute blood loss D62    RLS (restless legs syndrome) G25.81    Sepsis ([facility]) A41.9    Malignant neoplasm of skin C44.90    Iron deficiency anemia secondary to inadequate dietary iron intake D50.8    Thrombocytosis ([facility]) H48.2    Neutrophilic leukocytosis H72.6    Acute pain of left shoulder M25.512    MONCHO (acute kidney injury) ([facility]) N17.9    Arthritis of knee, left M17.12    Dehydration E86.0    GI bleed K92.2    Hyperglycemia R73.9    Hypomagnesemia E83.42    Moderate single current episode of major depressive disorder ([facility] F32.1    S/P total knee arthroplasty, left M50.962    UTI due to extended-spectrum beta lactamase (ESBL) producing Escherichia coli N39.0, B96.29, Z16.12    UTI (urinary tract infection) N39.0    CKD (chronic kidney disease) stage 3, GFR 30-59 ml/min ([facility]) N18.3    Debility R53.81    Frailty R54    Rheumatoid arthritis ([facility]) M06.9    Type 2 diabetes mellitus with microalbuminuria, without long-term current use of insulin ([facility]) E11.29, R80.9         Assessment:     1. Iron deficiency anemia secondary to inadequate dietary iron intake    2. Anemia due to stage 3 chronic kidney disease ([facility]    3. Thrombocytosis ([facility]    4. Neutrophilic leukocytosis      Plan:   Iron deficiency Anemia/anemia of chronic kidney disease stage III:  --- Prelaminarly CBC shows a H/H of 7.2 g/dl and 24.1%. PLTof 343K. --- Previously, the patient was informed that she will be treated with Procrit at a dose of 60,000 units every 2-4 weeks whenever the hemoglobin is below 10 g/dL and hematocrit is below 30%. ---She will be type and cross for 2 units PRBC and transfuse on 9/10/2020  --iron and ferritin will be ordered. It her levels are low she will be offered iron infusion. Ferritin is 10 and iron sat of 4 and iron of 13. She will be ordered venofer at this time. Thrombocytosis:   The current platelet count 981 K. This stable at this time. Neutrophilic leukocytosis: This has resolved.    ---The total WBC count from today shows WBC of 9.9 with 74% neutrophils and 15% lymphocytes. Follow up in 8 weeks.       Orders Placed This Encounter    METABOLIC PANEL, COMPREHENSIVE     Standing Status:   Future     Number of Occurrences:   1     Standing Expiration Date:   9/4/2021    FERRITIN     Standing Status:   Future     Number of Occurrences:   1     Standing Expiration Date:   9/3/2021    CBC WITH AUTOMATED DIFF     Standing Status:   Future     Number of Occurrences:   1 Standing Expiration Date:   9/4/2021       Monika Boss NP  9/5/2020

## 2020-09-04 ENCOUNTER — TELEPHONE (OUTPATIENT)
Dept: ONCOLOGY | Age: 80
End: 2020-09-04

## 2020-09-04 NOTE — TELEPHONE ENCOUNTER
Attempted to call patient regarding her date and time for blood transfusion. I was able to leave part of it in her voicemail before it was cut off.    She is scheduled on 09/09 @ 230pm for type and cross and scheduled for blood transfusion on 09/10/10 @ 9am.

## 2020-09-05 RX ORDER — SODIUM CHLORIDE 9 MG/ML
10 INJECTION INTRAMUSCULAR; INTRAVENOUS; SUBCUTANEOUS AS NEEDED
Status: CANCELLED | OUTPATIENT
Start: 2020-10-02

## 2020-09-05 RX ORDER — ONDANSETRON 2 MG/ML
8 INJECTION INTRAMUSCULAR; INTRAVENOUS AS NEEDED
Status: CANCELLED | OUTPATIENT
Start: 2020-10-02

## 2020-09-05 RX ORDER — DIPHENHYDRAMINE HYDROCHLORIDE 50 MG/ML
25 INJECTION, SOLUTION INTRAMUSCULAR; INTRAVENOUS AS NEEDED
Status: CANCELLED
Start: 2020-10-02

## 2020-09-05 RX ORDER — HYDROCORTISONE SODIUM SUCCINATE 100 MG/2ML
100 INJECTION, POWDER, FOR SOLUTION INTRAMUSCULAR; INTRAVENOUS AS NEEDED
Status: CANCELLED | OUTPATIENT
Start: 2020-10-02

## 2020-09-05 RX ORDER — SODIUM CHLORIDE 0.9 % (FLUSH) 0.9 %
10 SYRINGE (ML) INJECTION AS NEEDED
Status: CANCELLED | OUTPATIENT
Start: 2020-10-02

## 2020-09-05 RX ORDER — DIPHENHYDRAMINE HYDROCHLORIDE 50 MG/ML
50 INJECTION, SOLUTION INTRAMUSCULAR; INTRAVENOUS AS NEEDED
Status: CANCELLED
Start: 2020-10-02

## 2020-09-05 RX ORDER — ACETAMINOPHEN 325 MG/1
650 TABLET ORAL AS NEEDED
Status: CANCELLED
Start: 2020-10-02

## 2020-09-05 RX ORDER — HEPARIN 100 UNIT/ML
300-500 SYRINGE INTRAVENOUS AS NEEDED
Status: CANCELLED
Start: 2020-10-02

## 2020-09-05 RX ORDER — SODIUM CHLORIDE 9 MG/ML
25 INJECTION, SOLUTION INTRAVENOUS CONTINUOUS
Status: CANCELLED | OUTPATIENT
Start: 2020-10-02

## 2020-09-05 RX ORDER — ALBUTEROL SULFATE 0.83 MG/ML
2.5 SOLUTION RESPIRATORY (INHALATION) AS NEEDED
Status: CANCELLED
Start: 2020-10-02

## 2020-09-05 RX ORDER — EPINEPHRINE 1 MG/ML
0.3 INJECTION, SOLUTION, CONCENTRATE INTRAVENOUS AS NEEDED
Status: CANCELLED | OUTPATIENT
Start: 2020-10-02

## 2020-09-09 ENCOUNTER — HOSPITAL ENCOUNTER (OUTPATIENT)
Dept: INFUSION THERAPY | Age: 80
Discharge: HOME | End: 2020-09-09
Payer: MEDICARE

## 2020-09-09 LAB — HISTORY CHECKED?,CKHIST: NORMAL

## 2020-09-09 PROCEDURE — 36415 COLL VENOUS BLD VENIPUNCTURE: CPT

## 2020-09-09 PROCEDURE — 86920 COMPATIBILITY TEST SPIN: CPT

## 2020-09-09 PROCEDURE — 86900 BLOOD TYPING SEROLOGIC ABO: CPT

## 2020-09-09 RX ORDER — ACETAMINOPHEN 325 MG/1
650 TABLET ORAL ONCE
Status: CANCELLED | OUTPATIENT
Start: 2020-09-10 | End: 2020-09-10

## 2020-09-09 RX ORDER — DIPHENHYDRAMINE HCL 25 MG
25 CAPSULE ORAL ONCE
Status: CANCELLED | OUTPATIENT
Start: 2020-09-10 | End: 2020-09-10

## 2020-09-09 NOTE — PROGRESS NOTES
[facility] Progress Note    Date: 2020    Name: Kita Ramirez    MRN: [id number]         : 1940      Ms. Andrae Gonzales arrived to [facility] at [phone]. Ms. Andrae Gonzales was assessed and education was provided. Ms. Washington's vitals were reviewed. Visit Vitals  BP (P) 132/63 (BP 1 Location: Left arm, BP Patient Position: Sitting)   Pulse (P) 91   Temp (P) 98.8 °F (37.1 °C)   Resp (P) 18   SpO2 (P) 96%       Blood drawn for type and cross via lt AC, sent labs to blood bank. Red blood bank band applied and advised pt to not take off needs it for tomorrow's blood transfusion. Ms. Andrae Gonzales tolerated well without complaints. Ms. Andrae Gonzales was discharged from [place] [address] in stable condition at [phone]. She is to return on 9/10/20 at 0800 for her blood transfusion.      Arthur Chau RN  2020

## 2020-09-10 ENCOUNTER — HOSPITAL ENCOUNTER (OUTPATIENT)
Dept: INFUSION THERAPY | Age: 80
Discharge: HOME | End: 2020-09-10
Payer: MEDICARE

## 2020-09-10 VITALS
RESPIRATION RATE: 18 BRPM | TEMPERATURE: 97.9 F | HEART RATE: 62 BPM | DIASTOLIC BLOOD PRESSURE: 71 MMHG | SYSTOLIC BLOOD PRESSURE: 129 MMHG | OXYGEN SATURATION: 93 %

## 2020-09-10 PROCEDURE — P9016 RBC LEUKOCYTES REDUCED: HCPCS

## 2020-09-10 PROCEDURE — 74011250636 HC RX REV CODE- 250/636: Performed by: INTERNAL MEDICINE

## 2020-09-10 PROCEDURE — 77030013169 SET IV BLD ICUM -A

## 2020-09-10 PROCEDURE — 36430 TRANSFUSION BLD/BLD COMPNT: CPT

## 2020-09-10 PROCEDURE — 74011250637 HC RX REV CODE- 250/637: Performed by: INTERNAL MEDICINE

## 2020-09-10 RX ORDER — ACETAMINOPHEN 325 MG/1
650 TABLET ORAL ONCE
Status: COMPLETED | OUTPATIENT
Start: 2020-09-10 | End: 2020-09-10

## 2020-09-10 RX ORDER — SODIUM CHLORIDE 9 MG/ML
250 INJECTION, SOLUTION INTRAVENOUS AS NEEDED
Status: DISCONTINUED | OUTPATIENT
Start: 2020-09-10 | End: 2020-09-14 | Stop reason: HOSPADM

## 2020-09-10 RX ORDER — DIPHENHYDRAMINE HCL 25 MG
25 CAPSULE ORAL
Status: DISCONTINUED | OUTPATIENT
Start: 2020-09-10 | End: 2020-09-14 | Stop reason: HOSPADM

## 2020-09-10 RX ADMIN — DIPHENHYDRAMINE HYDROCHLORIDE 25 MG: 25 CAPSULE ORAL at 09:45

## 2020-09-10 RX ADMIN — SODIUM CHLORIDE 250 ML: 9 INJECTION, SOLUTION INTRAVENOUS at 09:51

## 2020-09-10 RX ADMIN — ACETAMINOPHEN 650 MG: 325 TABLET ORAL at 09:45

## 2020-09-10 NOTE — PROGRESS NOTES
[facility] Progress Note    Date: September 10, 2020    Name: Lety Shaver    MRN: [id number]         : 1940      Ms. Jose Quiroz arrived to [facility] at [address]. Ms. Jose Quiroz was assessed and education was provided. Care notes reviewed regarding blood transfusion. Patient verbalized understanding. Ms. Washington's vitals were reviewed. Visit Vitals  /71 (BP 1 Location: Left arm, BP Patient Position: Sitting)   Pulse 62   Temp 97.9 °F (36.6 °C)   Resp 18   SpO2 93%   Breastfeeding No       22g IV inserted in patient's right FA x2 attempts. Brisk blood return and flushes without difficulty. Normal saline initiated at [facility] per order. Pre-medications were administered as ordered. Benadryl 25 mg po and Tylenol 650 mg po. Two units of PRBCs administered as ordered followed by normal saline flush. Ms. Jose Quiroz tolerated transfusion without complaints. IV removed/ intact. Gauze/ coban applied to site. Instructed pt to report SOB, CP, elevated temp, back pain, or other symptoms of transfusion reaction to MD or ED. Pt verbalized understanding. Patient stayed 1 hour post transfusion for observation. Pt armbands removed/ shredded. Ms. Jose Quiroz was discharged from [place] [address] in stable condition at 1600. She is to return on 2020 at 1000 for her next appointment for Venofer 1 of 3.     Myna Roll  September 10, 2020

## 2020-09-11 LAB
ABO + RH BLD: NORMAL
BLD PROD TYP BPU: NORMAL
BLD PROD TYP BPU: NORMAL
BLOOD GROUP ANTIBODIES SERPL: NORMAL
BPU ID: NORMAL
BPU ID: NORMAL
CROSSMATCH RESULT,%XM: NORMAL
CROSSMATCH RESULT,%XM: NORMAL
SPECIMEN EXP DATE BLD: NORMAL
STATUS OF UNIT,%ST: NORMAL
STATUS OF UNIT,%ST: NORMAL
UNIT DIVISION, %UDIV: 0
UNIT DIVISION, %UDIV: 0

## 2020-09-24 ENCOUNTER — HOSPITAL ENCOUNTER (OUTPATIENT)
Dept: INFUSION THERAPY | Age: 80
Discharge: HOME | End: 2020-09-24

## 2020-09-24 DIAGNOSIS — D50.8 IRON DEFICIENCY ANEMIA SECONDARY TO INADEQUATE DIETARY IRON INTAKE: ICD-10-CM

## 2020-10-02 ENCOUNTER — HOSPITAL ENCOUNTER (OUTPATIENT)
Dept: INFUSION THERAPY | Age: 80
Discharge: HOME | End: 2020-10-02
Payer: MEDICARE

## 2020-10-02 VITALS
HEART RATE: 60 BPM | OXYGEN SATURATION: 94 % | RESPIRATION RATE: 20 BRPM | SYSTOLIC BLOOD PRESSURE: 134 MMHG | TEMPERATURE: 97 F | DIASTOLIC BLOOD PRESSURE: 75 MMHG

## 2020-10-02 DIAGNOSIS — D50.8 IRON DEFICIENCY ANEMIA SECONDARY TO INADEQUATE DIETARY IRON INTAKE: Primary | ICD-10-CM

## 2020-10-02 PROCEDURE — 96366 THER/PROPH/DIAG IV INF ADDON: CPT

## 2020-10-02 PROCEDURE — 96365 THER/PROPH/DIAG IV INF INIT: CPT

## 2020-10-02 PROCEDURE — 74011250636 HC RX REV CODE- 250/636: Performed by: NURSE PRACTITIONER

## 2020-10-02 RX ORDER — SODIUM CHLORIDE 9 MG/ML
25 INJECTION, SOLUTION INTRAVENOUS CONTINUOUS
Status: DISCONTINUED | OUTPATIENT
Start: 2020-10-02 | End: 2020-10-03 | Stop reason: HOSPADM

## 2020-10-02 RX ORDER — DIPHENHYDRAMINE HYDROCHLORIDE 50 MG/ML
25 INJECTION, SOLUTION INTRAMUSCULAR; INTRAVENOUS AS NEEDED
Status: CANCELLED
Start: 2020-10-07

## 2020-10-02 RX ORDER — ACETAMINOPHEN 325 MG/1
650 TABLET ORAL AS NEEDED
Status: CANCELLED
Start: 2020-10-07

## 2020-10-02 RX ORDER — SODIUM CHLORIDE 9 MG/ML
10 INJECTION INTRAMUSCULAR; INTRAVENOUS; SUBCUTANEOUS AS NEEDED
Status: CANCELLED | OUTPATIENT
Start: 2020-10-07

## 2020-10-02 RX ORDER — DIPHENHYDRAMINE HYDROCHLORIDE 50 MG/ML
50 INJECTION, SOLUTION INTRAMUSCULAR; INTRAVENOUS AS NEEDED
Status: CANCELLED
Start: 2020-10-07

## 2020-10-02 RX ORDER — HEPARIN 100 UNIT/ML
300-500 SYRINGE INTRAVENOUS AS NEEDED
Status: CANCELLED
Start: 2020-10-07

## 2020-10-02 RX ORDER — EPINEPHRINE 1 MG/ML
0.3 INJECTION, SOLUTION, CONCENTRATE INTRAVENOUS AS NEEDED
Status: CANCELLED | OUTPATIENT
Start: 2020-10-07

## 2020-10-02 RX ORDER — SODIUM CHLORIDE 9 MG/ML
25 INJECTION, SOLUTION INTRAVENOUS CONTINUOUS
Status: CANCELLED | OUTPATIENT
Start: 2020-10-07

## 2020-10-02 RX ORDER — HYDROCORTISONE SODIUM SUCCINATE 100 MG/2ML
100 INJECTION, POWDER, FOR SOLUTION INTRAMUSCULAR; INTRAVENOUS AS NEEDED
Status: CANCELLED | OUTPATIENT
Start: 2020-10-07

## 2020-10-02 RX ORDER — ONDANSETRON 2 MG/ML
8 INJECTION INTRAMUSCULAR; INTRAVENOUS AS NEEDED
Status: CANCELLED | OUTPATIENT
Start: 2020-10-07

## 2020-10-02 RX ORDER — SODIUM CHLORIDE 0.9 % (FLUSH) 0.9 %
10 SYRINGE (ML) INJECTION AS NEEDED
Status: DISCONTINUED | OUTPATIENT
Start: 2020-10-02 | End: 2020-10-03 | Stop reason: HOSPADM

## 2020-10-02 RX ORDER — SODIUM CHLORIDE 0.9 % (FLUSH) 0.9 %
10 SYRINGE (ML) INJECTION AS NEEDED
Status: CANCELLED | OUTPATIENT
Start: 2020-10-07

## 2020-10-02 RX ORDER — ALBUTEROL SULFATE 0.83 MG/ML
2.5 SOLUTION RESPIRATORY (INHALATION) AS NEEDED
Status: CANCELLED
Start: 2020-10-07

## 2020-10-02 RX ADMIN — Medication 10 ML: at 13:30

## 2020-10-02 RX ADMIN — Medication 20 ML: at 15:25

## 2020-10-02 RX ADMIN — SODIUM CHLORIDE 300 MG: 900 INJECTION, SOLUTION INTRAVENOUS at 13:40

## 2020-10-02 NOTE — PROGRESS NOTES
[facility] Progress Note    Date: 2020    Name: Soledad Medina    MRN: [id number]         : 1940      Ms. Bard Keenan arrived in the [facility] today at 1300, in stable condition, here for Dose # 1 of 3, IV Venofer Infusion. She was assessed and education was provided. Ms. Washington's vitals were reviewed. Visit Vitals  BP (!) 140/71 (BP 1 Location: Right arm, BP Patient Position: Sitting)   Pulse 99   Temp 97.5 °F (36.4 °C)   Resp 20   SpO2 94%   Breastfeeding No         PIV # 24 G was established in her posterior left forearm at 1330, without incident. Venofer (Iron Sucrose) 300 mg IV, was administered over approximately 90 minutes, per order, and without incident. After completion of the Venofer Infusion, Ms. Bard Keenan was observed for 30 minutes per policy, and without incident. (Her PIV was flushed well with NS & was clamped.)      After completion of the observation period, her PIV was removed and gauze/coban was applied. Ms. Bard Keenan tolerated well, and had no complaints. Ms. Bard Keenan was discharged from [place] [address] in stable condition at 1600. She is to return in 2 weeks, on Friday, 10-16-20 at 1300, for her next appointment, for Dose # 2 of 3, IV Venofer Infusion.      Lula Patel RN  2020  1:23 PM

## 2020-10-08 ENCOUNTER — APPOINTMENT (OUTPATIENT)
Dept: INFUSION THERAPY | Age: 80
End: 2020-10-08

## 2020-10-16 ENCOUNTER — HOSPITAL ENCOUNTER (OUTPATIENT)
Dept: INFUSION THERAPY | Age: 80
Discharge: HOME | End: 2020-10-16
Payer: MEDICARE

## 2020-10-16 ENCOUNTER — APPOINTMENT (OUTPATIENT)
Dept: INFUSION THERAPY | Age: 80
End: 2020-10-16
Payer: MEDICARE

## 2020-10-16 VITALS
RESPIRATION RATE: 18 BRPM | TEMPERATURE: 97.9 F | DIASTOLIC BLOOD PRESSURE: 65 MMHG | HEART RATE: 76 BPM | SYSTOLIC BLOOD PRESSURE: 147 MMHG | OXYGEN SATURATION: 96 %

## 2020-10-16 DIAGNOSIS — D50.8 IRON DEFICIENCY ANEMIA SECONDARY TO INADEQUATE DIETARY IRON INTAKE: Primary | ICD-10-CM

## 2020-10-16 PROCEDURE — 96365 THER/PROPH/DIAG IV INF INIT: CPT

## 2020-10-16 PROCEDURE — 74011250636 HC RX REV CODE- 250/636: Performed by: NURSE PRACTITIONER

## 2020-10-16 PROCEDURE — 96366 THER/PROPH/DIAG IV INF ADDON: CPT

## 2020-10-16 RX ORDER — SODIUM CHLORIDE 0.9 % (FLUSH) 0.9 %
10 SYRINGE (ML) INJECTION AS NEEDED
Status: CANCELLED | OUTPATIENT
Start: 2020-10-30

## 2020-10-16 RX ORDER — SODIUM CHLORIDE 9 MG/ML
25 INJECTION, SOLUTION INTRAVENOUS CONTINUOUS
Status: DISPENSED | OUTPATIENT
Start: 2020-10-16 | End: 2020-10-16

## 2020-10-16 RX ORDER — ONDANSETRON 2 MG/ML
8 INJECTION INTRAMUSCULAR; INTRAVENOUS AS NEEDED
Status: CANCELLED | OUTPATIENT
Start: 2020-10-30

## 2020-10-16 RX ORDER — ACETAMINOPHEN 325 MG/1
650 TABLET ORAL AS NEEDED
Status: CANCELLED
Start: 2020-10-30

## 2020-10-16 RX ORDER — DIPHENHYDRAMINE HYDROCHLORIDE 50 MG/ML
50 INJECTION, SOLUTION INTRAMUSCULAR; INTRAVENOUS AS NEEDED
Status: CANCELLED
Start: 2020-10-30

## 2020-10-16 RX ORDER — ALBUTEROL SULFATE 0.83 MG/ML
2.5 SOLUTION RESPIRATORY (INHALATION) AS NEEDED
Status: CANCELLED
Start: 2020-10-30

## 2020-10-16 RX ORDER — DIPHENHYDRAMINE HYDROCHLORIDE 50 MG/ML
25 INJECTION, SOLUTION INTRAMUSCULAR; INTRAVENOUS AS NEEDED
Status: CANCELLED
Start: 2020-10-30

## 2020-10-16 RX ORDER — HEPARIN 100 UNIT/ML
300-500 SYRINGE INTRAVENOUS AS NEEDED
Status: CANCELLED
Start: 2020-10-30

## 2020-10-16 RX ORDER — SODIUM CHLORIDE 9 MG/ML
10 INJECTION INTRAMUSCULAR; INTRAVENOUS; SUBCUTANEOUS AS NEEDED
Status: CANCELLED | OUTPATIENT
Start: 2020-10-30

## 2020-10-16 RX ORDER — SODIUM CHLORIDE 9 MG/ML
25 INJECTION, SOLUTION INTRAVENOUS CONTINUOUS
Status: CANCELLED | OUTPATIENT
Start: 2020-10-30

## 2020-10-16 RX ORDER — HYDROCORTISONE SODIUM SUCCINATE 100 MG/2ML
100 INJECTION, POWDER, FOR SOLUTION INTRAMUSCULAR; INTRAVENOUS AS NEEDED
Status: CANCELLED | OUTPATIENT
Start: 2020-10-30

## 2020-10-16 RX ORDER — EPINEPHRINE 1 MG/ML
0.3 INJECTION, SOLUTION, CONCENTRATE INTRAVENOUS AS NEEDED
Status: CANCELLED | OUTPATIENT
Start: 2020-10-30

## 2020-10-16 RX ADMIN — IRON SUCROSE 300 MG: 20 INJECTION, SOLUTION INTRAVENOUS at 11:16

## 2020-10-16 NOTE — PROGRESS NOTES
[facility] Progress Note    Date: 2020    Name: Juliet Baeza    MRN: [id number]         : 1940    Venofer Infusion #2 of 2    Ms. Washington to [facility], ambulatory, at 1100. Pt was assessed and education was provided. Pt states she felt fine after first Venofer infusion. Ms. Washington's vitals were reviewed and patient was observed for 5 minutes prior to treatment. Visit Vitals  BP (!) 147/65 (BP 1 Location: Left arm, BP Patient Position: Sitting)   Pulse 76   Temp 97.9 °F (36.6 °C)   Resp 18   SpO2 96%         24g PIV placed in right FA x 2 attempts. PIV flushed easily and had brisk blood return. Venofer 300mg infused over 90 minutes. Ms. Emilee Bernal tolerated the infusion, and had no complaints. VS remained stable. PIV flushed with NS 10 ml and removed. No bleeding or hematoma noted at site. Gauze and coban applied. Ms. Emilee Bernal was discharged from [place] [address] in stable condition at 1300. She is to return on 10/29/20 at 11:00 for next Venofer infusion.      Amber Alvarez RN  2020

## 2020-10-29 ENCOUNTER — HOSPITAL ENCOUNTER (OUTPATIENT)
Dept: INFUSION THERAPY | Age: 80
Discharge: HOME | End: 2020-10-29
Payer: MEDICARE

## 2020-10-29 VITALS
RESPIRATION RATE: 18 BRPM | DIASTOLIC BLOOD PRESSURE: 66 MMHG | HEART RATE: 97 BPM | OXYGEN SATURATION: 95 % | SYSTOLIC BLOOD PRESSURE: 137 MMHG | TEMPERATURE: 97.7 F

## 2020-10-29 DIAGNOSIS — D50.8 IRON DEFICIENCY ANEMIA SECONDARY TO INADEQUATE DIETARY IRON INTAKE: Primary | ICD-10-CM

## 2020-10-29 PROCEDURE — 96366 THER/PROPH/DIAG IV INF ADDON: CPT

## 2020-10-29 PROCEDURE — 96365 THER/PROPH/DIAG IV INF INIT: CPT

## 2020-10-29 PROCEDURE — 74011250636 HC RX REV CODE- 250/636: Performed by: NURSE PRACTITIONER

## 2020-10-29 RX ORDER — ALBUTEROL SULFATE 0.83 MG/ML
2.5 SOLUTION RESPIRATORY (INHALATION) AS NEEDED
Status: CANCELLED
Start: 2020-10-30

## 2020-10-29 RX ORDER — HYDROCORTISONE SODIUM SUCCINATE 100 MG/2ML
100 INJECTION, POWDER, FOR SOLUTION INTRAMUSCULAR; INTRAVENOUS AS NEEDED
Status: CANCELLED | OUTPATIENT
Start: 2020-10-30

## 2020-10-29 RX ORDER — ACETAMINOPHEN 325 MG/1
650 TABLET ORAL AS NEEDED
Status: CANCELLED
Start: 2020-10-30

## 2020-10-29 RX ORDER — DIPHENHYDRAMINE HYDROCHLORIDE 50 MG/ML
50 INJECTION, SOLUTION INTRAMUSCULAR; INTRAVENOUS AS NEEDED
Status: CANCELLED
Start: 2020-10-30

## 2020-10-29 RX ORDER — EPINEPHRINE 1 MG/ML
0.3 INJECTION, SOLUTION, CONCENTRATE INTRAVENOUS AS NEEDED
Status: CANCELLED | OUTPATIENT
Start: 2020-10-30

## 2020-10-29 RX ORDER — DIPHENHYDRAMINE HYDROCHLORIDE 50 MG/ML
25 INJECTION, SOLUTION INTRAMUSCULAR; INTRAVENOUS AS NEEDED
Status: CANCELLED
Start: 2020-10-30

## 2020-10-29 RX ORDER — SODIUM CHLORIDE 0.9 % (FLUSH) 0.9 %
10 SYRINGE (ML) INJECTION AS NEEDED
Status: CANCELLED | OUTPATIENT
Start: 2020-10-30

## 2020-10-29 RX ORDER — HEPARIN 100 UNIT/ML
300-500 SYRINGE INTRAVENOUS AS NEEDED
Status: CANCELLED
Start: 2020-10-30

## 2020-10-29 RX ORDER — SODIUM CHLORIDE 9 MG/ML
25 INJECTION, SOLUTION INTRAVENOUS CONTINUOUS
Status: CANCELLED | OUTPATIENT
Start: 2020-10-30

## 2020-10-29 RX ORDER — ONDANSETRON 2 MG/ML
8 INJECTION INTRAMUSCULAR; INTRAVENOUS AS NEEDED
Status: CANCELLED | OUTPATIENT
Start: 2020-10-30

## 2020-10-29 RX ORDER — SODIUM CHLORIDE 9 MG/ML
10 INJECTION INTRAMUSCULAR; INTRAVENOUS; SUBCUTANEOUS AS NEEDED
Status: CANCELLED | OUTPATIENT
Start: 2020-10-30

## 2020-10-29 RX ORDER — SODIUM CHLORIDE 0.9 % (FLUSH) 0.9 %
10 SYRINGE (ML) INJECTION AS NEEDED
Status: DISPENSED | OUTPATIENT
Start: 2020-10-29 | End: 2020-10-29

## 2020-10-29 RX ADMIN — SODIUM CHLORIDE 400 MG: 900 INJECTION, SOLUTION INTRAVENOUS at 10:59

## 2020-10-29 RX ADMIN — Medication 10 ML: at 13:51

## 2020-10-29 RX ADMIN — Medication 10 ML: at 10:59

## 2020-10-29 NOTE — PROGRESS NOTES
[facility] Progress Note    Date: 2020    Name: Theron Navarro    MRN: [id number]         : 1940       VENOFER, DOSE 3 OF 3      Ms. Ambrocio Gutierrez arrived to [facility] at 200. Ms. Ambrocio Gutierrez was assessed and education was provided. Pt denies having any issues related to previous Venofer infusions. Ms. Washington's vitals were reviewed. Visit Vitals  /66 (BP 1 Location: Right arm, BP Patient Position: Sitting)   Pulse 97   Temp 97.7 °F (36.5 °C)   Resp 18   SpO2 95%       22g IV inserted in patient's R forearm x1 attempt. Brisk blood return/ flushes without difficulty. Venofer 400mg administered as ordered followed by NS flush. Ms. Ambrocio Gutierrez tolerated well without complaints. IV removed/ intact. Gauze/ coban to site. Pt armband removed/ shredded. Ms. Ambrocio Gutierrez was discharged from [place] [address] in stable condition at [phone]. She has no future appointment scheduled w/ [facility] at this time.     Argelia Hogue RN  2020

## 2020-12-03 ENCOUNTER — HOSPITAL ENCOUNTER (OUTPATIENT)
Dept: ONCOLOGY | Age: 80
Discharge: HOME | End: 2020-12-03

## 2020-12-03 ENCOUNTER — HOSPITAL ENCOUNTER (OUTPATIENT)
Dept: LAB | Age: 80
Discharge: HOME | End: 2020-12-03
Payer: MEDICARE

## 2020-12-03 ENCOUNTER — OFFICE VISIT (OUTPATIENT)
Dept: ONCOLOGY | Age: 80
End: 2020-12-03
Payer: MEDICARE

## 2020-12-03 VITALS
RESPIRATION RATE: 16 BRPM | WEIGHT: 180 LBS | HEIGHT: 64 IN | DIASTOLIC BLOOD PRESSURE: 61 MMHG | HEART RATE: 91 BPM | OXYGEN SATURATION: 94 % | SYSTOLIC BLOOD PRESSURE: 125 MMHG | BODY MASS INDEX: 30.73 KG/M2

## 2020-12-03 DIAGNOSIS — D63.1 ANEMIA DUE TO STAGE 3 CHRONIC KIDNEY DISEASE, UNSPECIFIED WHETHER STAGE 3A OR 3B CKD (HCC): ICD-10-CM

## 2020-12-03 DIAGNOSIS — D75.839 THROMBOCYTOSIS: ICD-10-CM

## 2020-12-03 DIAGNOSIS — N18.30 ANEMIA DUE TO STAGE 3 CHRONIC KIDNEY DISEASE, UNSPECIFIED WHETHER STAGE 3A OR 3B CKD (HCC): ICD-10-CM

## 2020-12-03 DIAGNOSIS — D50.8 IRON DEFICIENCY ANEMIA SECONDARY TO INADEQUATE DIETARY IRON INTAKE: ICD-10-CM

## 2020-12-03 DIAGNOSIS — D50.8 IRON DEFICIENCY ANEMIA SECONDARY TO INADEQUATE DIETARY IRON INTAKE: Primary | ICD-10-CM

## 2020-12-03 LAB
ALBUMIN SERPL-MCNC: 3 G/DL (ref 3.4–5)
ALBUMIN/GLOB SERPL: 0.7 {RATIO} (ref 0.8–1.7)
ALP SERPL-CCNC: 207 U/L (ref 45–117)
ALT SERPL-CCNC: 17 U/L (ref 13–56)
ANION GAP SERPL CALC-SCNC: 6 MMOL/L (ref 3–18)
AST SERPL-CCNC: 23 U/L (ref 10–38)
BASO+EOS+MONOS # BLD AUTO: 1.1 K/UL (ref 0–2.3)
BASO+EOS+MONOS NFR BLD AUTO: 11 % (ref 0.1–17)
BILIRUB SERPL-MCNC: 0.6 MG/DL (ref 0.2–1)
BUN SERPL-MCNC: 15 MG/DL (ref 7–18)
BUN/CREAT SERPL: 11 (ref 12–20)
CALCIUM SERPL-MCNC: 8.8 MG/DL (ref 8.5–10.1)
CHLORIDE SERPL-SCNC: 99 MMOL/L (ref 100–111)
CO2 SERPL-SCNC: 35 MMOL/L (ref 21–32)
CREAT SERPL-MCNC: 1.34 MG/DL (ref 0.6–1.3)
DIFFERENTIAL METHOD BLD: ABNORMAL
ERYTHROCYTE [DISTWIDTH] IN BLOOD BY AUTOMATED COUNT: 19.6 % (ref 11.5–14.5)
FERRITIN SERPL-MCNC: 239 NG/ML (ref 8–388)
GLOBULIN SER CALC-MCNC: 4.3 G/DL (ref 2–4)
GLUCOSE SERPL-MCNC: 80 MG/DL (ref 74–99)
HCT VFR BLD AUTO: 34.9 % (ref 36–48)
HGB BLD-MCNC: 11.2 G/DL (ref 12–16)
IRON SATN MFR SERPL: 24 % (ref 20–50)
IRON SERPL-MCNC: 57 UG/DL (ref 50–175)
LYMPHOCYTES # BLD: 1.8 K/UL (ref 1.1–5.9)
LYMPHOCYTES NFR BLD: 17 % (ref 14–44)
MCH RBC QN AUTO: 27.5 PG (ref 25–35)
MCHC RBC AUTO-ENTMCNC: 32.1 G/DL (ref 31–37)
MCV RBC AUTO: 85.5 FL (ref 78–102)
NEUTS SEG # BLD: 7.6 K/UL (ref 1.8–9.5)
NEUTS SEG NFR BLD: 72 % (ref 40–70)
PLATELET # BLD AUTO: 286 K/UL (ref 140–440)
POTASSIUM SERPL-SCNC: 3.2 MMOL/L (ref 3.5–5.5)
PROT SERPL-MCNC: 7.3 G/DL (ref 6.4–8.2)
RBC # BLD AUTO: 4.08 M/UL (ref 4.1–5.1)
SODIUM SERPL-SCNC: 140 MMOL/L (ref 136–145)
TIBC SERPL-MCNC: 238 UG/DL (ref 250–450)
WBC # BLD AUTO: 10.5 K/UL (ref 4.5–13)

## 2020-12-03 PROCEDURE — G0463 HOSPITAL OUTPT CLINIC VISIT: HCPCS | Performed by: NURSE PRACTITIONER

## 2020-12-03 PROCEDURE — 80053 COMPREHEN METABOLIC PANEL: CPT

## 2020-12-03 PROCEDURE — 82728 ASSAY OF FERRITIN: CPT

## 2020-12-03 PROCEDURE — 83540 ASSAY OF IRON: CPT

## 2020-12-03 PROCEDURE — 99214 OFFICE O/P EST MOD 30 MIN: CPT | Performed by: NURSE PRACTITIONER

## 2020-12-03 PROCEDURE — 85025 COMPLETE CBC W/AUTO DIFF WBC: CPT | Performed by: NURSE PRACTITIONER

## 2020-12-03 NOTE — PATIENT INSTRUCTIONS
Anemia: Care Instructions  Your Care Instructions     Anemia is a low level of red blood cells, which carry oxygen throughout your body. Many things can cause anemia. Lack of iron is one of the most common causes. Your body needs iron to make hemoglobin, a substance in red blood cells that carries oxygen from the lungs to your body's cells. Without enough iron, the body produces fewer and smaller red blood cells. As a result, your body's cells do not get enough oxygen, and you feel tired and weak. And you may have trouble concentrating. Bleeding is the most common cause of a lack of iron. You may have heavy menstrual bleeding or bleeding caused by conditions such as ulcers, hemorrhoids, or cancer. Regular use of aspirin or other anti-inflammatory medicines (such as ibuprofen) also can cause bleeding in some people. A lack of iron in your diet also can cause anemia, especially at times when the body needs more iron, such as during pregnancy, infancy, and the teen years. Your doctor may have prescribed iron pills. It may take several months of treatment for your iron levels to return to normal. Your doctor also may suggest that you eat foods that are rich in iron, such as meat and beans. There are many other causes of anemia. It is not always due to a lack of iron. Finding the specific cause of your anemia will help your doctor find the right treatment for you. Follow-up care is a key part of your treatment and safety. Be sure to make and go to all appointments, and call your doctor if you are having problems. It's also a good idea to know your test results and keep a list of the medicines you take. How can you care for yourself at home? · Take your medicines exactly as prescribed. Call your doctor if you think you are having a problem with your medicine. · If your doctor recommends iron pills, take them as directed:  ? Try to take the pills on an empty stomach about 1 hour before or 2 hours after meals. But you may need to take iron with food to avoid an upset stomach. ? Do not take antacids or drink milk or caffeine drinks (such as coffee, tea, or cola) at the same time or within 2 hours of the time that you take your iron. They can make it hard for your body to absorb the iron. ? Vitamin C (from food or supplements) helps your body absorb iron. Try taking iron pills with a glass of orange juice or some other food that is high in vitamin C, such as citrus fruits. ? Iron pills may cause stomach problems, such as heartburn, nausea, diarrhea, constipation, and cramps. Be sure to drink plenty of fluids, and include fruits, vegetables, and fiber in your diet each day. Iron pills often make your bowel movements dark or green. ? If you forget to take an iron pill, do not take a double dose of iron the next time you take a pill. ? Keep iron pills out of the reach of small children. An overdose of iron can be very dangerous. · Follow your doctor's advice about eating iron-rich foods. These include red meat, shellfish, poultry, eggs, beans, raisins, whole-grain bread, and leafy green vegetables. · Steam vegetables to help them keep their iron content. When should you call for help? Call 911 anytime you think you may need emergency care. For example, call if:    · You have symptoms of a heart attack. These may include:  ? Chest pain or pressure, or a strange feeling in the chest.  ? Sweating. ? Shortness of breath. ? Nausea or vomiting. ? Pain, pressure, or a strange feeling in the back, neck, jaw, or upper belly or in one or both shoulders or arms. ? Lightheadedness or sudden weakness. ? A fast or irregular heartbeat. After you call 911, the  may tell you to chew 1 adult-strength or 2 to 4 low-dose aspirin. Wait for an ambulance. Do not try to drive yourself.     · You passed out (lost consciousness).    Call your doctor now or seek immediate medical care if:    · You have new or increased shortness of breath.     · You are dizzy or lightheaded, or you feel like you may faint.     · Your fatigue and weakness continue or get worse.     · You have any abnormal bleeding, such as:  ? Nosebleeds. ? Vaginal bleeding that is different (heavier, more frequent, at a different time of the month) than what you are used to.  ? Bloody or black stools, or rectal bleeding. ? Bloody or pink urine. Watch closely for changes in your health, and be sure to contact your doctor if:    · You do not get better as expected. Where can you learn more? Go to http://www.momin.com/  Enter R301 in the search box to learn more about \"Anemia: Care Instructions. \"  Current as of: November 8, 2019               Content Version: 12.6  © 2240-5564 [employer]. Care instructions adapted under license by [employer] (which disclaims liability or warranty for this information). If you have questions about a medical condition or this instruction, always ask your healthcare professional. [address] any warranty or liability for your use of this information. Iron Deficiency Anemia: Care Instructions  Your Care Instructions     Anemia means that you don't have enough red blood cells. Red blood cells carry oxygen around your body. When you have anemia, it can make you pale, weak, and tired. Many things can cause anemia. The most common cause is loss of blood. This can happen if you have heavy menstrual periods. It can also happen if you have bleeding in your stomach or bowel. You can also get anemia if you don't have enough iron in your diet or if it's hard for your body to absorb iron. In some cases, pregnancy causes anemia. That's because a pregnant woman needs more iron. Your doctor may do more tests to find the cause of your anemia. If a disease or other health problem is causing it, your doctor will treat that problem.   It's important to follow up with your doctor to make sure that your iron level returns to normal.  Follow-up care is a key part of your treatment and safety. Be sure to make and go to all appointments, and call your doctor if you are having problems. It's also a good idea to know your test results and keep a list of the medicines you take. How can you care for yourself at home? · If your doctor recommended iron pills, take them as directed. ? Try to take the pills on an empty stomach. You can do this about 1 hour before or 2 hours after meals. But you may need to take iron with food to avoid an upset stomach. ? Do not take antacids or drink milk or anything with caffeine within 2 hours of when you take your iron. They can keep your body from absorbing the iron well. ? Vitamin C helps your body absorb iron. You may want to take iron pills with a glass of orange juice or some other food high in vitamin C.  ? Iron pills may cause stomach problems. These include heartburn, nausea, diarrhea, constipation, and cramps. It can help to drink plenty of fluids and include fruits, vegetables, and fiber in your diet. ? It's normal for iron pills to make your stool a greenish or grayish black. But internal bleeding can also cause dark stool. So it's important to tell your doctor about any color changes. ? Call your doctor if you think you are having a problem with your iron pills. Even after you start to feel better, it will take several months for your body to build up its supply of iron. ? If you miss a pill, don't take a double dose. ? Keep iron pills out of the reach of small children. Too much iron can be very dangerous. · Eat foods with a lot of iron. These include red meat, shellfish, poultry, and eggs. They also include beans, raisins, whole-grain bread, and leafy green vegetables. · Steam your vegetables. This is the best way to prepare them if you want to get as much iron as possible. · Be safe with medicines.  Do not take nonsteroidal anti-inflammatory pain relievers unless your doctor tells you to. These include aspirin, naproxen (Aleve), and ibuprofen (Advil, Motrin). · Liquid iron can stain your teeth. But you can mix it with water or juice and drink it with a straw. Then it won't get on your teeth. When should you call for help? Call 911 anytime you think you may need emergency care. For example, call if:    · You passed out (lost consciousness). Call your doctor now or seek immediate medical care if:    · You are short of breath.     · You are dizzy or light-headed, or you feel like you may faint.     · You have new or worse bleeding. Watch closely for changes in your health, and be sure to contact your doctor if:    · You feel weaker or more tired than usual.     · You do not get better as expected. Where can you learn more? Go to http://www.gray.com/  Enter Z825 in the search box to learn more about \"Iron Deficiency Anemia: Care Instructions. \"  Current as of: November 8, 2019               Content Version: 12.6  © 4139-9646 [employer]. Care instructions adapted under license by [employer] (which disclaims liability or warranty for this information). If you have questions about a medical condition or this instruction, always ask your healthcare professional. [address] any warranty or liability for your use of this information. Learning About High-Iron Foods  What foods are high in iron? The foods you eat contain nutrients, such as vitamins and minerals. Iron is a nutrient. Your body needs the right amount to stay healthy and work as it should. You can use the list below to help you make choices about which foods to eat. Here are some foods that contain iron. They have 1 to 2 milligrams of iron per serving.   Fruits  · Figs (dried), 5 figs  Vegetables  · Asparagus (canned), 6 simon  · June, beet, Swiss chard, or turnip greens, 1 cup  · Dried peas, cooked, ½ cup  · Seaweed, spirulina (dried), ¼ cup  · Spinach, (cooked) ½ cup or (raw) 1 cup  Grains  · Cereals, fortified with iron, 1 cup  · Grits (instant, cooked), fortified with iron, ½ cup  Meats and other protein foods  · Beans (kidney, lima, navy, white), canned or cooked, ½ cup  · Beef or lamb, 3 oz  · Chicken giblets, 3 oz  · Chickpeas (garbanzo beans), ½ cup  · Liver of beef, lamb, or pork, 3 oz  · Oysters (cooked), 3 oz  · Sardines (canned), 3 oz  · Soybeans (boiled), ½ cup  · Tofu (firm), ½ cup  Work with your doctor to find out how much of this nutrient you need. Depending on your health, you may need more or less of it in your diet. Current as of: August 22, 2019               Content Version: 12.6  © 1140-1984 [employer]. Care instructions adapted under license by [employer] (which disclaims liability or warranty for this information). If you have questions about a medical condition or this instruction, always ask your healthcare professional. [place] [id number] any warranty or liability for your use of this information. Iron-Rich Diet: Care Instructions  Your Care Instructions     Your body needs iron to make hemoglobin. Hemoglobin is a substance in red blood cells that carries oxygen from the lungs to cells all through your body. If you do not get enough iron, your body makes fewer and smaller red blood cells. As a result, your body's cells may not get enough oxygen. Adult men need 8 milligrams of iron a day; adult women need 18 milligrams of iron a day. After menopause, women need 8 milligrams of iron a day. A pregnant woman needs 27 milligrams of iron a day. Infants and young children have higher iron needs relative to their size than other age groups. People who have lost blood because of ulcers or heavy menstrual periods may become very low in iron and may develop anemia.   Most people can get the iron their bodies need by eating enough of certain iron-rich foods. Your doctor may recommend that you take an iron supplement along with eating an iron-rich diet. Follow-up care is a key part of your treatment and safety. Be sure to make and go to all appointments, and call your doctor if you are having problems. It's also a good idea to know your test results and keep a list of the medicines you take. How can you care for yourself at home? · Make iron-rich foods a part of your daily diet. Iron-rich foods include:  ? All meats, such as chicken, beef, lamb, pork, fish, and shellfish. Liver is especially high in iron. ? Leafy green vegetables. ? Raisins, peas, beans, lentils, barley, and eggs. ? Iron-fortified breakfast cereals. · Eat foods with vitamin C along with iron-rich foods. Vitamin C helps you absorb more iron from food. Drink a glass of orange juice or another citrus juice with your food. · Eat meat and vegetables or grains together. The iron in meat helps your body absorb the iron in other foods. Where can you learn more? Go to http://www.gray.com/  Enter Z290 in the search box to learn more about \"Iron-Rich Diet: Care Instructions. \"  Current as of: August 22, 2019               Content Version: 12.6  © 5053-6477 [employer]. Care instructions adapted under license by [employer] (which disclaims liability or warranty for this information). If you have questions about a medical condition or this instruction, always ask your healthcare professional. [place] [address] any warranty or liability for your use of this information.

## 2020-12-03 NOTE — PROGRESS NOTES
Hematology/Oncology  Progress Note    Name: Gelacio Jay  Date: 12/3/2020  : 1940     Primary Care Provider: Randy Valenzuela NP      Ms. Jasson Perez is a 78y.o. year old female with severe iron deficiency anemia and anemia of chronic kidney disease     Current Therapy: Venofor as needed (completed on 10/29/2020)    Subjective:      Mrs. Jasson Perez is a 78-year-old woman who is  being seen for severe anemia. She has previously completed iron therapy with Venofer at a dose of 250 mg every 2 weeks for 4 doses on 10/29/2020. Completed 2 units of PRBC of 09/10/2020. She is using a walker for support and mobility. She has no new physical complaints. The patient reports her appetite is stable. She denies any bleeding or blood loss. She denied fever, chills, night sweat, unintentional weight loss, skin lumps or bumps, acute bleeding or bruising issues. Denied headache, acute vision change, dizziness, chest pain, worsen shortness of breath, palpitation, productive cough, nausea, vomiting, abdominal pain, altered bowel habits, dysuria, new bone pain or back pain, focal numbness or weakness. Independent with ADLs and IADLs    The past medical, surgical and social history has been reviewed and remains unchanged.    Past Medical History:   Diagnosis Date    Arrhythmia     hx afib    Edema 10/9/2014    sec to dependency, chf, mr, knee surgeries & norvasc reduce salt & fluid as no other Sx now     Essential hypertension     GERD (gastroesophageal reflux disease)     Malignant neoplasm of skin 2010    Murmur, cardiac     Type 2 diabetes mellitus with microalbuminuria, without long-term current use of insulin ([facility] [address] 2018     Past Surgical History:   Procedure Laterality Date    HX HYSTERECTOMY      HX KNEE REPLACEMENT      HX ORTHOPAEDIC      bilateral feet surgery     Social History     Socioeconomic History    Marital status:      Spouse name: Not on file    Number of children: Not on file    Years of education: Not on file    Highest education level: Not on file   Occupational History    Not on file   Social Needs    Financial resource strain: Not on file    Food insecurity     Worry: Not on file     Inability: Not on file    Transportation needs     Medical: Not on file     Non-medical: Not on file   Tobacco Use    Smoking status: Never Smoker    Smokeless tobacco: Never Used   Substance and Sexual Activity    Alcohol use: No    Drug use: No    Sexual activity: Not on file   Lifestyle    Physical activity     Days per week: Not on file     Minutes per session: Not on file    Stress: Not on file   Relationships    Social connections     Talks on phone: Not on file     Gets together: Not on file     Attends L[employer] service: Not on file     Active member of club or organization: Not on file     Attends meetings of clubs or organizations: Not on file     Relationship status: Not on file    Intimate partner violence     Fear of current or ex partner: Not on file     Emotionally abused: Not on file     Physically abused: Not on file     Forced sexual activity: Not on file   Other Topics Concern    Not on file   Social History Narrative    Not on file     Family History   Problem Relation Age of Onset    Heart Surgery Neg Hx     Heart Attack Neg Hx     Stroke Neg Hx      Current Outpatient Medications   Medication Sig Dispense Refill    nitrofurantoin, macrocrystal-monohydrate, (MACROBID) 100 mg capsule Take 1 Cap by mouth daily. 90 Cap 1    nitrofurantoin, macrocrystal-monohydrate, (MACROBID) 100 mg capsule Take 1 Cap by mouth daily. 90 Cap 1    ELIQUIS 5 mg tablet       dilTIAZem (TIAZAC) 360 mg ER capsule       mupirocin (BACTROBAN) 2 % ointment       hydroxychloroquine (PLAQUENIL) 200 mg tablet       nystatin (MYCOSTATIN) 100,000 unit/mL suspension RINSE WITH 2-5MLS BY MOUTH AND SWALLOW FOUR TIMES A DAY  0    lisinopril (PRINIVIL, ZESTRIL) 5 mg tablet Take  by mouth daily.       potassium chloride (K-DUR, KLOR-CON) 10 mEq tablet Take 1 Tab by mouth daily. 30 Tab 1    naproxen (NAPROSYN) 500 mg tablet Take 500 mg by mouth daily.  fexofenadine (ALLEGRA) 180 mg tablet Take  by mouth daily.  triamcinolone acetonide (KENALOG) 0.1 % ointment Apply  to affected area two (2) times a day. use thin layer      hydrocodone-acetaminophen (NORCO) 5-325 mg per tablet Take 1 tablet by mouth every four (4) hours as needed for Pain.  docusate sodium (COLACE) 100 mg capsule Take 100 mg by mouth daily.  traMADol (ULTRAM) 50 mg tablet Take 50 mg by mouth every six (6) hours as needed for Pain.  amLODIPine (NORVASC) 10 mg tablet 10 mg. Take 1 Tab by Mouth Once a Day.  aspirin 81 mg tablet 81 Tabs. Take 81 mg by Mouth Once a Day.  gabapentin (NEURONTIN) 300 mg capsule 300 mg. Take 1 Cap by Mouth 3 Times Daily.  esomeprazole (NEXIUM) 40 mg capsule Take 1 Cap by Mouth Once a Day.  pramipexole (MIRAPEX) 0.125 mg tablet Take 1 Tab by Mouth 3 Times Daily.  estradiol (ESTRACE) 1 mg tablet Take 1 mg by Mouth Once a Day. Review of Systems  General :The patient has no complaints and there is no physical distress evident. Psychological : patient denies having any psychological symptoms such as hallucinations depression or anxiety. Ophthalmic:the patient denies having any visual impairment or eye discomfort. ENT: there are no abnormalities reported. Allergy and Immunology:the patient denies having any seasonal allergies or allergies to medications other than those already outlined above. Hematological and Lymphatic: the patient denies having any bruising, bleeding or lymphadenopathy. Endocrine: the patient denies having any heat or cold intolerance. There is no history of diabetes or thyroid disorders. Breast: the patient denies having any history of breast mass or lumps.    Respiratory:the patient denies having any cough, shortness of breath, or dyspnea on exertion. Cardiovascular: there are no complaints of chest pain, palpitations, chest pounding, or dyspnea on exertion. Gastrointestinal: the patient denies having nausea, emesis, diarrhea, constipation, or blood in the stool. Genito-Urinary: the patient denies having urinary urgency, frequency, or dysuria. Musculoskeletal: with the exception of mild arthralgias the patient has no other musculoskeletal complaints. Neurological:  denies having any numbness, tingling, or neurologic deficits. Dermatological: patient denies having any unexplained rash, skin ulcerations, or hives. Objective:     Visit Vitals  /61   Pulse 91   Resp 16   Ht 5' 4\" (1.626 m)   Wt 81.6 kg (180 lb)   SpO2 94%   BMI 30.90 kg/m²     Pain Score: 0    Physical Exam:   ECO  General: Well appearing, in NAD  Skin: examination of the skin reveals no bruising, rash or petechiae  HEENT: Normocephalic, atraumatic. Conjunctiva and sclera are clear. Pupils are equal, round and reactive to light. EOMs are intact. ENT without oral mucosal lesions, stomatitis or thrush  Neck: supple without lymphadenopathy, JVD or thyromegaly  Lymphatics: no palpable cervical, supraclavicular, axillary or inguinal lymphadenopathy  Lungs: clear breath sounds bilaterally, no rhonchi or wheezes noted  Heart: Regular rate and rhythm, no murmurs, rubs or gallops, S1-S2 noted. Positive peripheral pulses bilaterally upper and lower extremities  Abdomen: soft, non-tender, non-distended, no HSM, positive bowel sounds  Extremities: without clubbing, cyanosis or edema  Neurologic: no focal deficits, she is using a walker for support and mobility, Alert and oriented x 3.   Psychologic: mood and affect are appropriate, no anxiety or depression noted    Laboratory Data:     Results for orders placed or performed during the hospital encounter of 20   CBC WITH 3 PART DIFF     Status: Abnormal   Result Value Ref Range Status    WBC 10.5 4.5 - 13.0 K/uL Final    RBC 4.08 (L) 4.10 - 5.10 M/uL Final    HGB 11.2 (L) 12.0 - 16 g/dL Final    HCT 34.9 (L) 36 - 48 % Final    MCV 85.5 78 - 102 FL Final    MCH 27.5 25.0 - 35.0 PG Final    MCHC 32.1 31 - 37 g/dL Final    RDW 19.6 (H) 11.5 - 14.5 % Final    PLATELET 290 965 - 003 K/uL Final    NEUTROPHILS 72 (H) 40 - 70 % Final    MIXED CELLS 11 0.1 - 17 % Final    LYMPHOCYTES 17 14 - 44 % Final    ABS. NEUTROPHILS 7.6 1.8 - 9.5 K/UL Final    ABS. MIXED CELLS 1.1 0.0 - 2.3 K/uL Final    ABS. LYMPHOCYTES 1.8 1.1 - 5.9 K/UL Final     Comment: Test performed at [address] [place] [address] or Outpatient Infusion Center Location. Reviewed by Medical Director. DF AUTOMATED   Final       CT Results (most recent):  Results from Abstract encounter on 20   CT ABD PELV W CONT    Impression [address] Cat Scan  [address]  [phone]      Imaging Result       Name:    Azalea Mejia ([id number])  Sex: Female :  1940 Ordering Provider: Fay RIVAS Provider:   Diagnosis:     Abd pain, acute, generalized  Procedures Performed:  CT ABD/PELVIS-IV ONLY  [id number] Exam Date/Time:  2020  5:49 AM              EXAM: CT ABD/PELVIS-IV ONLY     CLINICAL INDICATION/HISTORY:  Abd pain, acute, generalized    > Additional: None     COMPARISON: 2018.     TECHNIQUE: Helical CT imaging of the abdomen and pelvis was performed with intravenous contrast. Multiplanar reformats were generated. One or more dose reduction techniques were used on this CT: automated exposure control, adjustment of the mAs and/or kVp according to patient size, and iterative reconstruction techniques. The specific techniques used on this CT exam have been documented in the patient's electronic medical record.   Digital Imaging and Communications in Medicine (DICOM) format image data are available to nonaffiliated external healthcare facilities or entities on a secure, media free, reciprocally searchable basis with patient authorization for at least a 12-month period after this study.     _______________     FINDINGS:     LOWER CHEST: There is a stable right diaphragmatic hernia containing fat.     LIVER, BILIARY: Liver is normal. No biliary dilation. There is layering dense material within the gallbladder similar to previous.     PANCREAS: The pancreas is atrophic.     SPLEEN: Normal.     ADRENALS: Normal.     KIDNEYS/URETERS/BLADDER: There are numerous bilateral small renal hypodensities similar to previous most consistent with cysts. There is no hydronephrosis.     PELVIC ORGANS: There is pelvic prolapse.      LYMPH NODES: No enlarged lymph nodes.     GASTROINTESTINAL TRACT: No bowel dilation or wall thickening. There is a moderate hiatal hernia.      VASCULATURE: Unremarkable.     BONES: There is degenerative change throughout the thoracolumbar spine with mild to moderate curvature to the left.     OTHER: There is a small umbilical hernia containing fat.     _______________     IMPRESSION     Moderate hiatal hernia.      Layering dense material within the gallbladder similar to previous likely small calculi and/or sludge.     Small umbilical hernia containing fat.     No acute intra-abdominal process identified.           Signed By: Skylar Coronel MD on 4/27/2020 6:22 AM           Dictated by: Kimmy Melendez on Mon Apr 27, 2020  6:12:03 AM EDT  Signed By:Jose Antonio Trujillo MD   4/27/2020  6:22 AM        [facility] [[id number]]    ~~This report was copied and pasted from the servicing EHR system. ~~            Patient Active Problem List   Diagnosis Code    Nonspecific abnormal electrocardiogram (ECG) (EKG) R94.31    Essential hypertension, benign I10    Pre-operative cardiovascular examination Z01.810    Undiagnosed cardiac murmurs R01.1    Mitral valve disorders(424.0) I05.9    Edema R60.9    Nausea and vomiting in adult R11.2    Localized osteoarthrosis not specified whether primary or secondary, lower leg M17.10    Hyperkalemia E87.5    GERD (gastroesophageal reflux disease) K21.9    DJD (degenerative joint disease) M19.90    Disorder of bone and cartilage M89.9, W20.3    Diastolic dysfunction with chronic heart failure ([facility]) I50.32    Community acquired pneumonia of left lower lobe of lung J18.9    Chest tightness R07.89    Chest pain R07.9    CAP (community acquired pneumonia) J18.9    A-fib ([facility]) I48.91    Abdominal pain R10.9    Paroxysmal atrial fibrillation ([facility]) I48.0    Postoperative anemia due to acute blood loss D62    RLS (restless legs syndrome) G25.81    Sepsis ([facility]) A41.9    Malignant neoplasm of skin C44.90    Iron deficiency anemia secondary to inadequate dietary iron intake D50.8    Thrombocytosis ([facility]) E51.6    Neutrophilic leukocytosis R57.7    Acute pain of left shoulder M25.512    MONCHO (acute kidney injury) ([facility]) N17.9    Arthritis of knee, left M17.12    Dehydration E86.0    GI bleed K92.2    Hyperglycemia R73.9    Hypomagnesemia E83.42    Moderate single current episode of major depressive disorder ([facility]) F32.1    S/P total knee arthroplasty, left W80.592    UTI due to extended-spectrum beta lactamase (ESBL) producing Escherichia coli N39.0, B96.29, Z16.12    UTI (urinary tract infection) N39.0    CKD (chronic kidney disease) stage 3, GFR 30-59 ml/min N18.30    Debility R53.81    Frailty R54    Rheumatoid arthritis ([facility]) M06.9    Type 2 diabetes mellitus with microalbuminuria, without long-term current use of insulin ([facility]) E11.29, R80.9         Assessment:     1. Iron deficiency anemia secondary to inadequate dietary iron intake    2. Thrombocytosis ([address]    3. Anemia due to stage 3 chronic kidney disease, unspecified whether stage 3a or 3b CKD      Plan:   Iron deficiency Anemia/anemia of chronic kidney disease stage III:  --- CBC shows a H/H of 11.2 g/dl and 34.9%. PLTof [id number]. ---- Previously, the patient was informed that she will be treated with Procrit at a dose of 60,000 units every 2-4 weeks whenever the hemoglobin is below 10 g/dL and hematocrit is below 30%.   ---Completed 2 units of PRBC on  9/10/2020  ---completed 4 doses of Venofer 10/29/2020   --iron and ferritin will be ordered. It her levels are low she will be offered iron infusion. Thrombocytosis:   The current platelet count 546 K. This stable at this time. Neutrophilic leukocytosis: This has resolved.    ---The total WBC count from today shows WBC of 10.5 with 72% neutrophils and 17% lymphocytes. Follow up in 10 weeks.    Follow-up and Dispositions  ·   Return in about 10 weeks (around 2/11/2021) for FOLLOW UP WITH DR. PINZON.        Orders Placed This Encounter    COMPLETE CBC & AUTO DIFF WBC    METABOLIC PANEL, COMPREHENSIVE     Standing Status:   Future     Standing Expiration Date:   12/4/2021    FERRITIN     Standing Status:   Future     Standing Expiration Date:   12/3/2021    IRON PROFILE     Standing Status:   Future     Standing Expiration Date:   12/3/2021    InHouse CBC ([employer])     Standing Status:   Future     Number of Occurrences:   1     Standing Expiration Date:   12/10/2020       Batsheva Gonzalez NP  12/3/2020

## 2021-02-26 NOTE — PROGRESS NOTES
Hematology/Oncology  Progress Note    Name: Hetal Meehan  Date: 3/4/2021  : 1940     Primary Care Provider: Eriberto Yoo NP      Ms. Lenin Carbone is a 78y.o. year old female with severe iron deficiency anemia and anemia of chronic kidney disease     Current Therapy: Venofor as needed (completed on 10/29/2020)    Subjective:      Mrs. Lenin Carbone is a 24-year-old woman who is  being seen for severe anemia. She has previously completed iron therapy with Venofer at a dose of 250 mg every 2 weeks for 4 doses on 10/29/2020. Completed 2 units of PRBC of 09/10/2020. She is using a walker for support and mobility. Today the patient reported no new physical complaints. The patient reports her appetite is stable. She denies any bleeding or blood loss. She denied fever, chills, night sweat, unintentional weight loss, skin lumps or bumps, acute bleeding or bruising issues. Denied headache, acute vision change, dizziness, chest pain, worsen shortness of breath, palpitation, productive cough, nausea, vomiting, abdominal pain, altered bowel habits, dysuria, new bone pain or back pain, focal numbness or weakness. The past medical, surgical and social history has been reviewed and remains unchanged.    Past Medical History:   Diagnosis Date    Arrhythmia     hx afib    Edema 10/9/2014    sec to dependency, chf, mr, knee surgeries & norvasc reduce salt & fluid as no other Sx now     Essential hypertension     GERD (gastroesophageal reflux disease)     Malignant neoplasm of skin 2010    Murmur, cardiac     Type 2 diabetes mellitus with microalbuminuria, without long-term current use of insulin ([facility] [address] 2018     Past Surgical History:   Procedure Laterality Date    HX HYSTERECTOMY      HX KNEE REPLACEMENT      HX ORTHOPAEDIC      bilateral feet surgery     Social History     Socioeconomic History    Marital status:      Spouse name: Not on file    Number of children: Not on file    Years of education: Not on file    Highest education level: Not on file   Occupational History    Not on file   Social Needs    Financial resource strain: Not on file    Food insecurity     Worry: Not on file     Inability: Not on file    Transportation needs     Medical: Not on file     Non-medical: Not on file   Tobacco Use    Smoking status: Never Smoker    Smokeless tobacco: Never Used   Substance and Sexual Activity    Alcohol use: No    Drug use: No    Sexual activity: Not on file   Lifestyle    Physical activity     Days per week: Not on file     Minutes per session: Not on file    Stress: Not on file   Relationships    Social connections     Talks on phone: Not on file     Gets together: Not on file     Attends Gnosticist service: Not on file     Active member of club or organization: Not on file     Attends meetings of clubs or organizations: Not on file     Relationship status: Not on file    Intimate partner violence     Fear of current or ex partner: Not on file     Emotionally abused: Not on file     Physically abused: Not on file     Forced sexual activity: Not on file   Other Topics Concern    Not on file   Social History Narrative    Not on file     Family History   Problem Relation Age of Onset    Heart Surgery Neg Hx     Heart Attack Neg Hx     Stroke Neg Hx      Current Outpatient Medications   Medication Sig Dispense Refill    loratadine (CLARITIN PO) Take  by mouth.  ELIQUIS 5 mg tablet       dilTIAZem (TIAZAC) 360 mg ER capsule       hydroxychloroquine (PLAQUENIL) 200 mg tablet       potassium chloride (K-DUR, KLOR-CON) 10 mEq tablet Take 1 Tab by mouth daily. 30 Tab 1    naproxen (NAPROSYN) 500 mg tablet Take 500 mg by mouth daily.  triamcinolone acetonide (KENALOG) 0.1 % ointment Apply  to affected area two (2) times a day. use thin layer      docusate sodium (COLACE) 100 mg capsule Take 100 mg by mouth daily.       traMADol (ULTRAM) 50 mg tablet Take 50 mg by mouth every six (6) hours as needed for Pain.  gabapentin (NEURONTIN) 300 mg capsule 300 mg. Take 1 Cap by Mouth 3 Times Daily.  esomeprazole (NEXIUM) 40 mg capsule Take 1 Cap by Mouth Once a Day.  pramipexole (MIRAPEX) 0.125 mg tablet Take 1 Tab by Mouth 3 Times Daily.  nitrofurantoin, macrocrystal-monohydrate, (MACROBID) 100 mg capsule Take 1 Cap by mouth daily. 90 Cap 1    nitrofurantoin, macrocrystal-monohydrate, (MACROBID) 100 mg capsule Take 1 Cap by mouth daily. 90 Cap 1    mupirocin (BACTROBAN) 2 % ointment       nystatin (MYCOSTATIN) 100,000 unit/mL suspension RINSE WITH 2-5MLS BY MOUTH AND SWALLOW FOUR TIMES A DAY  0    lisinopril (PRINIVIL, ZESTRIL) 5 mg tablet Take  by mouth daily.  fexofenadine (ALLEGRA) 180 mg tablet Take  by mouth daily.  hydrocodone-acetaminophen (NORCO) 5-325 mg per tablet Take 1 tablet by mouth every four (4) hours as needed for Pain.  amLODIPine (NORVASC) 10 mg tablet 10 mg. Take 1 Tab by Mouth Once a Day.  aspirin 81 mg tablet 81 Tabs. Take 81 mg by Mouth Once a Day.  estradiol (ESTRACE) 1 mg tablet Take 1 mg by Mouth Once a Day. Review of Systems  General :The patient has no complaints and there is no physical distress evident. Psychological : patient denies having any psychological symptoms such as hallucinations depression or anxiety. Ophthalmic:the patient denies having any visual impairment or eye discomfort. ENT: there are no abnormalities reported. Allergy and Immunology:the patient denies having any seasonal allergies or allergies to medications other than those already outlined above. Hematological and Lymphatic: the patient denies having any bruising, bleeding or lymphadenopathy. Endocrine: the patient denies having any heat or cold intolerance. There is no history of diabetes or thyroid disorders. Breast: the patient denies having any history of breast mass or lumps.    Respiratory:the patient denies having any cough, shortness of breath, or dyspnea on exertion. Cardiovascular: there are no complaints of chest pain, palpitations, chest pounding, or dyspnea on exertion. Gastrointestinal: the patient denies having nausea, emesis, diarrhea, constipation, or blood in the stool. Genito-Urinary: the patient denies having urinary urgency, frequency, or dysuria. Musculoskeletal: with the exception of mild arthralgias the patient has no other musculoskeletal complaints. Neurological:  denies having any numbness, tingling, or neurologic deficits. Dermatological: patient denies having any unexplained rash, skin ulcerations, or hives. Objective:     Visit Vitals  BP (!) 162/89 (BP Patient Position: Sitting)   Pulse 79   Temp 98.1 °F (36.7 °C) (Skin)   Resp 18   Wt 82.6 kg (182 lb)   SpO2 95%   BMI 31.24 kg/m²     Pain Score: 0    Physical Exam:   ECO  General: Well appearing, in NAD  Skin: examination of the skin reveals no bruising, rash or petechiae  HEENT: Normocephalic, atraumatic. Conjunctiva and sclera are clear. Pupils are equal, round and reactive to light. EOMs are intact. ENT without oral mucosal lesions, stomatitis or thrush  Neck: supple without lymphadenopathy, JVD or thyromegaly  Lymphatics: no palpable cervical, supraclavicular, axillary or inguinal lymphadenopathy  Lungs: clear breath sounds bilaterally, no rhonchi or wheezes noted  Heart: Regular rate and rhythm, no murmurs, rubs or gallops, S1-S2 noted. Positive peripheral pulses bilaterally upper and lower extremities  Abdomen: soft, non-tender, non-distended, no HSM, positive bowel sounds  Extremities: without clubbing, cyanosis or edema  Neurologic: no focal deficits, she is using a walker for support and mobility, Alert and oriented x 3.   Psychologic: mood and affect are appropriate, no anxiety or depression noted    Laboratory Data:     Results for orders placed or performed during the hospital encounter of 20   CBC WITH 3 PART DIFF     Status: Abnormal   Result Value Ref Range Status    WBC 10.5 4.5 - 13.0 K/uL Final    RBC 4.08 (L) 4.10 - 5.10 M/uL Final    HGB 11.2 (L) 12.0 - 16 g/dL Final    HCT 34.9 (L) 36 - 48 % Final    MCV 85.5 78 - 102 FL Final    MCH 27.5 25.0 - 35.0 PG Final    MCHC 32.1 31 - 37 g/dL Final    RDW 19.6 (H) 11.5 - 14.5 % Final    PLATELET 540 471 - 786 K/uL Final    NEUTROPHILS 72 (H) 40 - 70 % Final    MIXED CELLS 11 0.1 - 17 % Final    LYMPHOCYTES 17 14 - 44 % Final    ABS. NEUTROPHILS 7.6 1.8 - 9.5 K/UL Final    ABS. MIXED CELLS 1.1 0.0 - 2.3 K/uL Final    ABS. LYMPHOCYTES 1.8 1.1 - 5.9 K/UL Final     Comment: Test performed at [address] [place] [address] or Outpatient Infusion Center Location. Reviewed by Medical Director. DF AUTOMATED   Final       CT Results (most recent):  Results from Abstract encounter on 20   CT ABD PELV W CONT    Impression [address] Cat Scan  [address] [phone]      Imaging Result       Name:    Macrina Garrido ([id number])  Sex: Female :  1940 Ordering Provider: Deepak Knott  CC Provider:   Diagnosis:     Abd pain, acute, generalized  Procedures Performed:  CT ABD/PELVIS-IV ONLY  [id number] Exam Date/Time:  2020  5:49 AM              EXAM: CT ABD/PELVIS-IV ONLY     CLINICAL INDICATION/HISTORY:  Abd pain, acute, generalized    > Additional: None     COMPARISON: 2018.     TECHNIQUE: Helical CT imaging of the abdomen and pelvis was performed with intravenous contrast. Multiplanar reformats were generated. One or more dose reduction techniques were used on this CT: automated exposure control, adjustment of the mAs and/or kVp according to patient size, and iterative reconstruction techniques. The specific techniques used on this CT exam have been documented in the patient's electronic medical record.   Digital Imaging and Communications in Medicine (DICOM) format image data are available to nonaffiliated external healthcare facilities or entities on a secure, media free, reciprocally searchable basis with patient authorization for at least a 12-month period after this study.     _______________     FINDINGS:     LOWER CHEST: There is a stable right diaphragmatic hernia containing fat.     LIVER, BILIARY: Liver is normal. No biliary dilation. There is layering dense material within the gallbladder similar to previous.     PANCREAS: The pancreas is atrophic.     SPLEEN: Normal.     ADRENALS: Normal.     KIDNEYS/URETERS/BLADDER: There are numerous bilateral small renal hypodensities similar to previous most consistent with cysts. There is no hydronephrosis.     PELVIC ORGANS: There is pelvic prolapse.      LYMPH NODES: No enlarged lymph nodes.     GASTROINTESTINAL TRACT: No bowel dilation or wall thickening. There is a moderate hiatal hernia.      VASCULATURE: Unremarkable.     BONES: There is degenerative change throughout the thoracolumbar spine with mild to moderate curvature to the left.     OTHER: There is a small umbilical hernia containing fat.     _______________     IMPRESSION     Moderate hiatal hernia.      Layering dense material within the gallbladder similar to previous likely small calculi and/or sludge.     Small umbilical hernia containing fat.     No acute intra-abdominal process identified.           Signed By: Constance Cali MD on 4/27/2020 6:22 AM           Dictated by: Raven Rizo on Mon Apr 27, 2020  6:12:03 AM EDT  Signed By:Robert Trujillo MD   4/27/2020  6:22 AM        [facility] [[id number]]    ~~This report was copied and pasted from the servicing EHR system. ~~            Patient Active Problem List   Diagnosis Code    Nonspecific abnormal electrocardiogram (ECG) (EKG) R94.31    Essential hypertension, benign I10    Pre-operative cardiovascular examination Z01.810    Undiagnosed cardiac murmurs R01.1    Mitral valve disorders(424.0) I05.9    Edema R60.9    Nausea and vomiting in adult R11.2    Localized osteoarthrosis not specified whether primary or secondary, lower leg M17.10    Hyperkalemia E87.5    GERD (gastroesophageal reflux disease) K21.9    DJD (degenerative joint disease) M19.90    Disorder of bone and cartilage M89.9, C52.7    Diastolic dysfunction with chronic heart failure ([facility]) I50.32    Community acquired pneumonia of left lower lobe of lung J18.9    Chest tightness R07.89    Chest pain R07.9    CAP (community acquired pneumonia) J18.9    A-fib ([facility]) I48.91    Abdominal pain R10.9    Paroxysmal atrial fibrillation ([facility]) I48.0    Postoperative anemia due to acute blood loss D62    RLS (restless legs syndrome) G25.81    Sepsis ([facility]) A41.9    Malignant neoplasm of skin C44.90    Iron deficiency anemia secondary to inadequate dietary iron intake D50.8    Thrombocytosis ([facility]) X01.1    Neutrophilic leukocytosis N12.4    Acute pain of left shoulder M25.512    MONCHO (acute kidney injury) ([facility]) N17.9    Arthritis of knee, left M17.12    Dehydration E86.0    GI bleed K92.2    Hyperglycemia R73.9    Hypomagnesemia E83.42    Moderate single current episode of major depressive disorder ([facility]) F32.1    S/P total knee arthroplasty, left W01.951    UTI due to extended-spectrum beta lactamase (ESBL) producing Escherichia coli N39.0, B96.29, Z16.12    UTI (urinary tract infection) N39.0    CKD (chronic kidney disease) stage 3, GFR 30-59 ml/min N18.30    Debility R53.81    Frailty R54    Rheumatoid arthritis ([facility]) M06.9    Type 2 diabetes mellitus with microalbuminuria, without long-term current use of insulin ([facility]) E11.29, R80.9         Assessment:     1. Iron deficiency anemia secondary to inadequate dietary iron intake    2. Thrombocytosis ([address]    3.  Anemia due to stage 3 chronic kidney disease, unspecified whether stage 3a or 3b CKD      Plan:   Iron deficiency Anemia  Anemia of chronic kidney disease stage III  Anemia of chronic disease  -- Completed 2 units of PRBC on  9/10/2020  -- Completed 4 doses of Venofer 10/29/2020   -- Today I have reviewed with the patient about recent lab reports. 12/3/2020 CBC reported hemoglobin 11.2, hematocrit 34.9%, total WBC 10.5, and platelet 051,296. Chemistry reviewed. Iron profile showed saturation 24% and ferritin 239. Plan  -- We will continue to monitor CBC, Iron profiles, ferritin  -- She was advised to f/u PCP/GI if any further colonoscopy indicated. Thrombocytosis:   -- The current platelet count 502 K. This stable at this time. Neutrophilic leukocytosis: This has resolved. -- 12/3/2020 CBC reported hemoglobin 11.2, hematocrit 34.9%, total WBC 10.5, and platelet 604,149. Chemistry reviewed. -- We will see the patient back in clinic in about 3 months. Always sooner if required. The patient can have lab done prior to our next clinic visit. Orders Placed This Encounter    loratadine (CLARITIN PO)     Sig: Take  by mouth. Ms. Michael Martinez has a reminder for a \"due or due soon\" health maintenance. I have asked that she contact her primary care provider for follow-up on this health maintenance. All of patient's questions answered to their apparent satisfaction. They verbally show understanding and agreement with aforementioned plan. William Greer MD  3/4/2021          About 25 minutes were spent for this encounter with more than 50% of the time spent in face-to-face counseling, discussing on diagnosis and management plan going forward, and co-ordination of care. Parts of this document has been produced using Dragon dictation system. Unrecognized errors in transcription may be present. Please do not hesitate to reach out for any questions or clarifications.     CC: Roni Orellana NP

## 2021-03-04 ENCOUNTER — OFFICE VISIT (OUTPATIENT)
Dept: ONCOLOGY | Age: 81
End: 2021-03-04
Payer: MEDICARE

## 2021-03-04 VITALS
SYSTOLIC BLOOD PRESSURE: 178 MMHG | BODY MASS INDEX: 31.24 KG/M2 | HEART RATE: 79 BPM | TEMPERATURE: 98.1 F | DIASTOLIC BLOOD PRESSURE: 72 MMHG | OXYGEN SATURATION: 95 % | WEIGHT: 182 LBS | RESPIRATION RATE: 18 BRPM

## 2021-03-04 DIAGNOSIS — D75.839 THROMBOCYTOSIS: ICD-10-CM

## 2021-03-04 DIAGNOSIS — D63.1 ANEMIA DUE TO STAGE 3 CHRONIC KIDNEY DISEASE, UNSPECIFIED WHETHER STAGE 3A OR 3B CKD (HCC): ICD-10-CM

## 2021-03-04 DIAGNOSIS — N18.30 ANEMIA DUE TO STAGE 3 CHRONIC KIDNEY DISEASE, UNSPECIFIED WHETHER STAGE 3A OR 3B CKD (HCC): ICD-10-CM

## 2021-03-04 DIAGNOSIS — D50.8 IRON DEFICIENCY ANEMIA SECONDARY TO INADEQUATE DIETARY IRON INTAKE: Primary | ICD-10-CM

## 2021-03-04 PROCEDURE — G0463 HOSPITAL OUTPT CLINIC VISIT: HCPCS | Performed by: INTERNAL MEDICINE

## 2021-03-04 PROCEDURE — 99214 OFFICE O/P EST MOD 30 MIN: CPT | Performed by: INTERNAL MEDICINE

## 2021-06-09 ENCOUNTER — OFFICE VISIT (OUTPATIENT)
Dept: ONCOLOGY | Age: 81
End: 2021-06-09
Payer: MEDICARE

## 2021-06-09 VITALS
BODY MASS INDEX: 30.73 KG/M2 | DIASTOLIC BLOOD PRESSURE: 83 MMHG | TEMPERATURE: 97.9 F | SYSTOLIC BLOOD PRESSURE: 149 MMHG | OXYGEN SATURATION: 94 % | HEART RATE: 87 BPM | RESPIRATION RATE: 16 BRPM | WEIGHT: 179 LBS

## 2021-06-09 DIAGNOSIS — D63.1 ANEMIA DUE TO STAGE 3 CHRONIC KIDNEY DISEASE, UNSPECIFIED WHETHER STAGE 3A OR 3B CKD (HCC): ICD-10-CM

## 2021-06-09 DIAGNOSIS — D50.8 IRON DEFICIENCY ANEMIA SECONDARY TO INADEQUATE DIETARY IRON INTAKE: Primary | ICD-10-CM

## 2021-06-09 DIAGNOSIS — D75.839 THROMBOCYTOSIS: ICD-10-CM

## 2021-06-09 DIAGNOSIS — D72.9 NEUTROPHILIC LEUKOCYTOSIS: ICD-10-CM

## 2021-06-09 DIAGNOSIS — N18.30 ANEMIA DUE TO STAGE 3 CHRONIC KIDNEY DISEASE, UNSPECIFIED WHETHER STAGE 3A OR 3B CKD (HCC): ICD-10-CM

## 2021-06-09 PROCEDURE — G8753 SYS BP > OR = 140: HCPCS | Performed by: NURSE PRACTITIONER

## 2021-06-09 PROCEDURE — G9717 DOC PT DX DEP/BP F/U NT REQ: HCPCS | Performed by: NURSE PRACTITIONER

## 2021-06-09 PROCEDURE — G8754 DIAS BP LESS 90: HCPCS | Performed by: NURSE PRACTITIONER

## 2021-06-09 PROCEDURE — 1090F PRES/ABSN URINE INCON ASSESS: CPT | Performed by: NURSE PRACTITIONER

## 2021-06-09 PROCEDURE — G8400 PT W/DXA NO RESULTS DOC: HCPCS | Performed by: NURSE PRACTITIONER

## 2021-06-09 PROCEDURE — G8427 DOCREV CUR MEDS BY ELIG CLIN: HCPCS | Performed by: NURSE PRACTITIONER

## 2021-06-09 PROCEDURE — G8536 NO DOC ELDER MAL SCRN: HCPCS | Performed by: NURSE PRACTITIONER

## 2021-06-09 PROCEDURE — 1101F PT FALLS ASSESS-DOCD LE1/YR: CPT | Performed by: NURSE PRACTITIONER

## 2021-06-09 PROCEDURE — G0463 HOSPITAL OUTPT CLINIC VISIT: HCPCS | Performed by: NURSE PRACTITIONER

## 2021-06-09 PROCEDURE — G8417 CALC BMI ABV UP PARAM F/U: HCPCS | Performed by: NURSE PRACTITIONER

## 2021-06-09 PROCEDURE — 99213 OFFICE O/P EST LOW 20 MIN: CPT | Performed by: NURSE PRACTITIONER

## 2021-06-09 NOTE — PROGRESS NOTES
Hematology/Oncology  Progress Note    Name: Alvarado Mccormick  Date: 2021  : 1940     Primary Care Provider: Casey Peralta NP      Ms. Ana Rosa Padilla is a 78y.o. year old female with severe iron deficiency anemia and anemia of chronic kidney disease     Current Therapy: Venofor as needed (completed on 10/29/2020)    Subjective:      Mrs. Ana Rosa Padilla is a 78-year-old woman who is  being seen for severe anemia. She has previously completed iron therapy with Venofer at a dose of 250 mg every 2 weeks for 4 doses on 10/29/2020. Completed 2 units of PRBC of 09/10/2020. She is using a walker for support and mobility. Today the patient reported no new physical complaints. The patient reports her appetite is stable. She denies any bleeding or blood loss. She denied fever, chills, night sweat, unintentional weight loss, skin lumps or bumps, acute bleeding or bruising issues. Denied headache, acute vision change, dizziness, chest pain, worsen shortness of breath, palpitation, productive cough, nausea, vomiting, abdominal pain, altered bowel habits, dysuria, new bone pain or back pain, focal numbness or weakness. The past medical, surgical and social history has been reviewed and remains unchanged.    Past Medical History:   Diagnosis Date    Arrhythmia     hx afib    Edema 10/9/2014    sec to dependency, chf, mr, knee surgeries & norvasc reduce salt & fluid as no other Sx now     Essential hypertension     GERD (gastroesophageal reflux disease)     Malignant neoplasm of skin 2010    Murmur, cardiac     Type 2 diabetes mellitus with microalbuminuria, without long-term current use of insulin ([facility] [address] 2018     Past Surgical History:   Procedure Laterality Date    HX HYSTERECTOMY      HX KNEE REPLACEMENT      HX ORTHOPAEDIC      bilateral feet surgery     Social History     Socioeconomic History    Marital status:      Spouse name: Not on file    Number of children: Not on file    Years of education: Not on file    Highest education level: Not on file   Occupational History    Not on file   Tobacco Use    Smoking status: Never Smoker    Smokeless tobacco: Never Used   Substance and Sexual Activity    Alcohol use: No    Drug use: No    Sexual activity: Not on file   Other Topics Concern    Not on file   Social History Narrative    Not on file     Social Determinants of Health     Financial Resource Strain:     Difficulty of Paying Living Expenses:    Food Insecurity:     Worried About Running Out of Food in the Last Year:     [address] in the Last Year:    Transportation Needs:     Lack of Transportation (Medical):  Lack of Transportation (Non-Medical):    Physical Activity:     Days of Exercise per Week:     Minutes of Exercise per Session:    Stress:     Feeling of Stress :    Social Connections:     Frequency of Communication with Friends and Family:     Frequency of Social Gatherings with Friends and Family:     Attends [employer] Services:     Active Member of Clubs or Organizations:     Attends Club or Organization Meetings:     Marital Status:    Intimate Partner Violence:     Fear of Current or Ex-Partner:     Emotionally Abused:     Physically Abused:     Sexually Abused:      Family History   Problem Relation Age of Onset    Heart Surgery Neg Hx     Heart Attack Neg Hx     Stroke Neg Hx      Current Outpatient Medications   Medication Sig Dispense Refill    loratadine (CLARITIN PO) Take  by mouth.  ELIQUIS 5 mg tablet       dilTIAZem (TIAZAC) 360 mg ER capsule       mupirocin (BACTROBAN) 2 % ointment       hydroxychloroquine (PLAQUENIL) 200 mg tablet       lisinopril (PRINIVIL, ZESTRIL) 5 mg tablet Take  by mouth daily.  potassium chloride (K-DUR, KLOR-CON) 10 mEq tablet Take 1 Tab by mouth daily. 30 Tab 1    naproxen (NAPROSYN) 500 mg tablet Take 500 mg by mouth daily.       triamcinolone acetonide (KENALOG) 0.1 % ointment Apply  to affected area two (2) times a day. use thin layer      docusate sodium (COLACE) 100 mg capsule Take 100 mg by mouth daily.  traMADol (ULTRAM) 50 mg tablet Take 50 mg by mouth every six (6) hours as needed for Pain.  aspirin 81 mg tablet 81 Tabs. Take 81 mg by Mouth Once a Day.  gabapentin (NEURONTIN) 300 mg capsule 300 mg. Take 1 Cap by Mouth 3 Times Daily.  esomeprazole (NEXIUM) 40 mg capsule Take 1 Cap by Mouth Once a Day.  pramipexole (MIRAPEX) 0.125 mg tablet Take 1 Tab by Mouth 3 Times Daily. Review of Systems  General :The patient has no complaints and there is no physical distress evident. Psychological : patient denies having any psychological symptoms such as hallucinations depression or anxiety. Ophthalmic:the patient denies having any visual impairment or eye discomfort. ENT: there are no abnormalities reported. Allergy and Immunology:the patient denies having any seasonal allergies or allergies to medications other than those already outlined above. Hematological and Lymphatic: the patient denies having any bruising, bleeding or lymphadenopathy. Endocrine: the patient denies having any heat or cold intolerance. There is no history of diabetes or thyroid disorders. Breast: the patient denies having any history of breast mass or lumps. Respiratory:the patient denies having any cough, shortness of breath, or dyspnea on exertion. Cardiovascular: there are no complaints of chest pain, palpitations, chest pounding, or dyspnea on exertion. Gastrointestinal: the patient denies having nausea, emesis, diarrhea, constipation, or blood in the stool. Genito-Urinary: the patient denies having urinary urgency, frequency, or dysuria. Musculoskeletal: with the exception of mild arthralgias the patient has no other musculoskeletal complaints. Neurological:  denies having any numbness, tingling, or neurologic deficits.    Dermatological: patient denies having any unexplained rash, skin ulcerations, or hives. Objective:     Visit Vitals  BP (!) 149/83 (BP Patient Position: Sitting)   Pulse 87   Temp 97.9 °F (36.6 °C) (Temporal)   Resp 16   Wt 81.2 kg (179 lb)   SpO2 94%   BMI 30.73 kg/m²     Pain Score: 0    Physical Exam:   ECO  General: Well appearing, in NAD  Skin: examination of the skin reveals no bruising, rash or petechiae  HEENT: Normocephalic, atraumatic. Conjunctiva and sclera are clear. Pupils are equal, round and reactive to light. EOMs are intact. ENT without oral mucosal lesions, stomatitis or thrush  Neck: supple without lymphadenopathy, JVD or thyromegaly  Lymphatics: no palpable cervical, supraclavicular, axillary or inguinal lymphadenopathy  Lungs: clear breath sounds bilaterally, no rhonchi or wheezes noted  Heart: Regular rate and rhythm, no murmurs, rubs or gallops, S1-S2 noted. Positive peripheral pulses bilaterally upper and lower extremities  Abdomen: soft, non-tender, non-distended, no HSM, positive bowel sounds  Extremities: without clubbing, cyanosis or edema  Neurologic: no focal deficits, she is using a walker for support and mobility, Alert and oriented x 3. Psychologic: mood and affect are appropriate, no anxiety or depression noted    Laboratory Data:     Results for orders placed or performed during the hospital encounter of 20   CBC WITH 3 PART DIFF     Status: Abnormal   Result Value Ref Range Status    WBC 10.5 4.5 - 13.0 K/uL Final    RBC 4.08 (L) 4.10 - 5.10 M/uL Final    HGB 11.2 (L) 12.0 - 16 g/dL Final    HCT 34.9 (L) 36 - 48 % Final    MCV 85.5 78 - 102 FL Final    MCH 27.5 25.0 - 35.0 PG Final    MCHC 32.1 31 - 37 g/dL Final    RDW 19.6 (H) 11.5 - 14.5 % Final    PLATELET 577 359 - 268 K/uL Final    NEUTROPHILS 72 (H) 40 - 70 % Final    MIXED CELLS 11 0.1 - 17 % Final    LYMPHOCYTES 17 14 - 44 % Final    ABS. NEUTROPHILS 7.6 1.8 - 9.5 K/UL Final    ABS. MIXED CELLS 1.1 0.0 - 2.3 K/uL Final    ABS.  LYMPHOCYTES 1.8 1.1 - 5.9 K/UL Final     Comment: Test performed at [address] [place] [address] or Outpatient Infusion Center Location. Reviewed by Medical Director. DF AUTOMATED   Final       CT Results (most recent):  Results from Abstract encounter on 20    CT ABD PELV W CONT    Impression  [facility] COGNITIVE DISORDERS Cat Scan  [address]  [phone]    Imaging Result      Name:  Jayda Rojas ([id number])  Sex: Female :  1940 Ordering Provider: Ricardo Diallo  CC Provider:   Diagnosis:    Abd pain, acute, generalized  Procedures Performed:  CT ABD/PELVIS-IV ONLY  [id number] Exam Date/Time:  2020  5:49 AM      EXAM: CT ABD/PELVIS-IV ONLY    CLINICAL INDICATION/HISTORY:  Abd pain, acute, generalized  > Additional: None    COMPARISON: 2018. TECHNIQUE: Helical CT imaging of the abdomen and pelvis was performed with intravenous contrast. Multiplanar reformats were generated. One or more dose reduction techniques were used on this CT: automated exposure control, adjustment of the mAs and/or kVp according to patient size, and iterative reconstruction techniques. The specific techniques used on this CT exam have been documented in the patient's electronic medical record. Digital Imaging and Communications in Medicine (DICOM) format image data are available to nonaffiliated external healthcare facilities or entities on a secure, media free, reciprocally searchable basis with patient authorization for at least a 12-month period after this study. _______________    FINDINGS:    LOWER CHEST: There is a stable right diaphragmatic hernia containing fat. LIVER, BILIARY: Liver is normal. No biliary dilation. There is layering dense material within the gallbladder similar to previous. PANCREAS: The pancreas is atrophic.     SPLEEN: Normal.    ADRENALS: Normal.    KIDNEYS/URETERS/BLADDER: There are numerous bilateral small renal hypodensities similar to previous most consistent with cysts. There is no hydronephrosis. PELVIC ORGANS: There is pelvic prolapse. LYMPH NODES: No enlarged lymph nodes. GASTROINTESTINAL TRACT: No bowel dilation or wall thickening. There is a moderate hiatal hernia. VASCULATURE: Unremarkable. BONES: There is degenerative change throughout the thoracolumbar spine with mild to moderate curvature to the left. OTHER: There is a small umbilical hernia containing fat.    _______________    IMPRESSION    Moderate hiatal hernia. Layering dense material within the gallbladder similar to previous likely small calculi and/or sludge. Small umbilical hernia containing fat. No acute intra-abdominal process identified. Signed By: Juan Sánchez MD on 4/27/2020 6:22 AM        Dictated by: Jason Cardona on Mon Apr 27, 2020  6:12:03 AM EDT  Signed By:Jeniffer Trujillo MD  4/27/2020  6:22 AM      [facility] [[id number]]    ~~This report was copied and pasted from the servicing EHR system. ~~         Patient Active Problem List   Diagnosis Code    Nonspecific abnormal electrocardiogram (ECG) (EKG) R94.31    Essential hypertension, benign I10    Pre-operative cardiovascular examination Z01.810    Undiagnosed cardiac murmurs R01.1    Mitral valve disorders(424.0) I05.9    Edema R60.9    Nausea and vomiting in adult R11.2    Localized osteoarthrosis not specified whether primary or secondary, lower leg M17.10    Hyperkalemia E87.5    GERD (gastroesophageal reflux disease) K21.9    DJD (degenerative joint disease) M19.90    Disorder of bone and cartilage M89.9, U71.9    Diastolic dysfunction with chronic heart failure (HCC) I50.32    Community acquired pneumonia of left lower lobe of lung J18.9    Chest tightness R07.89    Chest pain R07.9    CAP (community acquired pneumonia) J18.9    A-fib ([address] I48.91    Abdominal pain R10.9    Paroxysmal atrial fibrillation (HCC) I48.0  Postoperative anemia due to acute blood loss D62    RLS (restless legs syndrome) G25.81    Sepsis ([facility]) A41.9    Malignant neoplasm of skin C44.90    Iron deficiency anemia secondary to inadequate dietary iron intake D50.8    Thrombocytosis ([facility]) D80.7    Neutrophilic leukocytosis X50.3    Acute pain of left shoulder M25.512    MONCHO (acute kidney injury) ([facility] [address] N17.9    Arthritis of knee, left M17.12    Dehydration E86.0    GI bleed K92.2    Hyperglycemia R73.9    Hypomagnesemia E83.42    Moderate single current episode of major depressive disorder ([facility]) F32.1    S/P total knee arthroplasty, left D66.886    UTI due to extended-spectrum beta lactamase (ESBL) producing Escherichia coli N39.0, B96.29, Z16.12    UTI (urinary tract infection) N39.0    CKD (chronic kidney disease) stage 3, GFR 30-59 ml/min ([facility]) N18.30    Debility R53.81    Frailty R54    Rheumatoid arthritis ([facility]) M06.9    Type 2 diabetes mellitus with microalbuminuria, without long-term current use of insulin ([facility]) E11.29, R80.9         Assessment:     1. Iron deficiency anemia secondary to inadequate dietary iron intake    2. Thrombocytosis ([facility] [address]    3. Anemia due to stage 3 chronic kidney disease, unspecified whether stage 3a or 3b CKD ([facility] [address]    4. Neutrophilic leukocytosis      Plan:   Iron deficiency Anemia  Anemia of chronic kidney disease stage III  Anemia of chronic disease  -- Completed 2 units of PRBC on  9/10/2020  -- Completed 4 doses of Venofer 10/29/2020   -- Today I have reviewed with the patient about recent lab reports. 5/27/2021 CBC reported hemoglobin 12.0,  hematocrit 37.6%, total WBC 12.2, and platelet 819,328. Chemistry reviewed. Iron profile showed saturation 17% and ferritin 199    Plan  -- We will continue to monitor CBC, Iron profiles, ferritin  -- She was advised to f/u PCP/GI if any further colonoscopy indicated. Thrombocytosis:   -- The current platelet count 429 K. This is stable at this time. Neutrophilic leukocytosis: This has resolved. -- 5/27/2021 CBC reported hemoglobin 12, hematocrit 37.6%, total WBC 12.2, and platelet 384,378. Chemistry reviewed. -- We will see the patient back in clinic in about 3 months. Always sooner if required. The patient can have lab done prior to our next clinic visit. Orders Placed This Encounter    METABOLIC PANEL, COMPREHENSIVE     Standing Status:   Future     Standing Expiration Date:   10/9/2021    FERRITIN     Standing Status:   Future     Standing Expiration Date:   10/9/2021    IRON PROFILE     Standing Status:   Future     Standing Expiration Date:   6/9/2022    CBC WITH AUTOMATED DIFF     Standing Status:   Future     Standing Expiration Date:   10/9/2021           Ms. Zenobia Aaron has a reminder for a \"due or due soon\" health maintenance. I have asked that she contact her primary care provider for follow-up on this health maintenance. All of patient's questions answered to their apparent satisfaction. They verbally show understanding and agreement with aforementioned plan. Kita Pineda NP  6/9/2021          About 25 minutes were spent for this encounter with more than 50% of the time spent in face-to-face counseling, discussing on diagnosis and management plan going forward, and co-ordination of care. Parts of this document has been produced using Dragon dictation system. Unrecognized errors in transcription may be present. Please do not hesitate to reach out for any questions or clarifications.     CC: Caden Cunningham NP

## 2021-06-09 NOTE — PATIENT INSTRUCTIONS
Learning About High-Iron Foods What foods are high in iron? The foods you eat contain nutrients, such as vitamins and minerals. Iron is a nutrient. Your body needs the right amount to stay healthy and work as it should. You can use the list below to help you make choices about which foods to eat. Here are some foods that contain iron. They have 1 to 2 milligrams of iron per serving. Fruits · Figs (dried), 5 figs Vegetables · Asparagus (canned), 6 simon · June, beet, Swiss chard, or turnip greens, 1 cup · Dried peas, cooked, ½ cup · Seaweed, spirulina (dried), ¼ cup · Spinach, (cooked) ½ cup or (raw) 1 cup Grains · Cereals, fortified with iron, 1 cup · Grits (instant, cooked), fortified with iron, ½ cup Meats and other protein foods · Beans (kidney, lima, navy, white), canned or cooked, ½ cup · Beef or lamb, 3 oz · Chicken giblets, 3 oz · Chickpeas (garbanzo beans), ½ cup · Liver of beef, lamb, or pork, 3 oz · Oysters (cooked), 3 oz · Sardines (canned), 3 oz · Soybeans (boiled), ½ cup · Tofu (firm), ½ cup Work with your doctor to find out how much of this nutrient you need. Depending on your health, you may need more or less of it in your diet. Current as of: December 17, 2020               Content Version: 12.8 © 2006-2021 [employer]. Care instructions adapted under license by [employer] (which disclaims liability or warranty for this information). If you have questions about a medical condition or this instruction, always ask your healthcare professional. [place] [address] any warranty or liability for your use of this information. Iron-Rich Diet: Care Instructions Your Care Instructions Your body needs iron to make hemoglobin. Hemoglobin is a substance in red blood cells that carries oxygen from the lungs to cells all through your body. If you do not get enough iron, your body makes fewer and smaller red blood cells.  As a result, your body's cells may not get enough oxygen. Adult men need 8 milligrams of iron a day; adult women need 18 milligrams of iron a day. After menopause, women need 8 milligrams of iron a day. A pregnant woman needs 27 milligrams of iron a day. Infants and young children have higher iron needs relative to their size than other age groups. People who have lost blood because of ulcers or heavy menstrual periods may become very low in iron and may develop anemia. Most people can get the iron their bodies need by eating enough of certain iron-rich foods. Your doctor may recommend that you take an iron supplement along with eating an iron-rich diet. Follow-up care is a key part of your treatment and safety. Be sure to make and go to all appointments, and call your doctor if you are having problems. It's also a good idea to know your test results and keep a list of the medicines you take. How can you care for yourself at home? · Make iron-rich foods a part of your daily diet. Iron-rich foods include: ? All meats, such as chicken, beef, lamb, pork, fish, and shellfish. Liver is especially high in iron. ? Leafy green vegetables. ? Raisins, peas, beans, lentils, barley, and eggs. ? Iron-fortified breakfast cereals. · Eat foods with vitamin C along with iron-rich foods. Vitamin C helps you absorb more iron from food. Drink a glass of orange juice or another citrus juice with your food. · Eat meat and vegetables or grains together. The iron in meat helps your body absorb the iron in other foods. Where can you learn more? Go to http://www.gray.com/ Enter 0328 [id number] in the search box to learn more about \"Iron-Rich Diet: Care Instructions. \" Current as of: December 17, 2020               Content Version: 12.8 © 2530-7341 [employer], [employer]. Care instructions adapted under license by [employer] (which disclaims liability or warranty for this information).  If you have questions about a medical condition or this instruction, always ask your healthcare professional. [address] any warranty or liability for your use of this information. Anemia From Chronic Disease: Care Instructions Your Care Instructions Anemia is a low level of red blood cells. Red blood cells carry oxygen from your lungs to the rest of your body. Sometimes when you have a long-term (chronic) disease, such as kidney disease, arthritis, diabetes, cancer, or an infection, your body does not make enough red blood cells. Follow-up care is a key part of your treatment and safety. Be sure to make and go to all appointments, and call your doctor if you are having problems. It's also a good idea to know your test results and keep a list of the medicines you take. How can you care for yourself at home? · Follow your doctor's instructions to treat the chronic condition that is causing the anemia. · Be safe with medicines. Take your medicine to treat your chronic condition exactly as prescribed. Call your doctor if you think you are having a problem with your medicine. · Take your medicine for anemia exactly as prescribed. Call your doctor if you think you are having a problem with your medicine. Medicines to increase the number of red blood cells (such as epoetin or darbepoetin) may be given as an injection. ? If you miss a dose, take it as soon as you can, unless it is almost time for your next dose. In that case, get back on your regular schedule and take only one dose. ? Do not freeze this medicine. Store it in the refrigerator. Do not shake the bottle before you prepare the shot. · Keep all your appointments for blood tests to check on your hemoglobin levels. When should you call for help? Call 911 anytime you think you may need emergency care. For example, call if: 
  · You passed out (lost consciousness).   
Call your doctor now or seek immediate medical care if: 
  · You are short of breath.  
  · You are dizzy or light-headed, or you feel like you may faint.  
  · You have new or worse bleeding. Watch closely for changes in your health, and be sure to contact your doctor if: 
  · You feel weaker or more tired than usual.  
  · You do not get better as expected. Where can you learn more? Go to http://www.gray.com/ Enter E502 in the search box to learn more about \"Anemia From Chronic Disease: Care Instructions. \" Current as of: September 23, 2020               Content Version: 12.8 © 4914-5696 [employer]. Care instructions adapted under license by [employer] (which disclaims liability or warranty for this information). If you have questions about a medical condition or this instruction, always ask your healthcare professional. [address] any warranty or liability for your use of this information. Iron Deficiency Anemia: Care Instructions Your Care Instructions Anemia means that you don't have enough red blood cells. Red blood cells carry oxygen around your body. When you have anemia, it can make you pale, weak, and tired. Many things can cause anemia. The most common cause is loss of blood. This can happen if you have heavy menstrual periods. It can also happen if you have bleeding in your stomach or bowel. You can also get anemia if you don't have enough iron in your diet or if it's hard for your body to absorb iron. In some cases, pregnancy causes anemia. That's because a pregnant woman needs more iron. Your doctor may do more tests to find the cause of your anemia. If a disease or other health problem is causing it, your doctor will treat that problem. It's important to follow up with your doctor to make sure that your iron level returns to normal. 
Follow-up care is a key part of your treatment and safety.  Be sure to make and go to all appointments, and call your doctor if you are having problems. It's also a good idea to know your test results and keep a list of the medicines you take. How can you care for yourself at home? · If your doctor recommended iron pills, take them as directed. ? Try to take the pills on an empty stomach. You can do this about 1 hour before or 2 hours after meals. But you may need to take iron with food to avoid an upset stomach. ? Do not take antacids or drink milk or anything with caffeine within 2 hours of when you take your iron. They can keep your body from absorbing the iron well. ? Vitamin C helps your body absorb iron. You may want to take iron pills with a glass of orange juice or some other food high in vitamin C. 
? Iron pills may cause stomach problems. These include heartburn, nausea, diarrhea, constipation, and cramps. It can help to drink plenty of fluids and include fruits, vegetables, and fiber in your diet. ? It's normal for iron pills to make your stool a greenish or grayish black. But internal bleeding can also cause dark stool. So it's important to tell your doctor about any color changes. ? Call your doctor if you think you are having a problem with your iron pills. Even after you start to feel better, it will take several months for your body to build up its supply of iron. ? If you miss a pill, don't take a double dose. ? Keep iron pills out of the reach of small children. Too much iron can be very dangerous. · Eat foods with a lot of iron. These include red meat, shellfish, poultry, and eggs. They also include beans, raisins, whole-grain bread, and leafy green vegetables. · Steam your vegetables. This is the best way to prepare them if you want to get as much iron as possible. · Be safe with medicines. Do not take nonsteroidal anti-inflammatory pain relievers unless your doctor tells you to. These include aspirin, naproxen (Aleve), and ibuprofen (Advil, Motrin). · Liquid iron can stain your teeth.  But you can mix it with water or juice and drink it with a straw. Then it won't get on your teeth. When should you call for help? Call 911 anytime you think you may need emergency care. For example, call if: 
  · You passed out (lost consciousness). Call your doctor now or seek immediate medical care if: 
  · You are short of breath.  
  · You are dizzy or light-headed, or you feel like you may faint.  
  · You have new or worse bleeding. Watch closely for changes in your health, and be sure to contact your doctor if: 
  · You feel weaker or more tired than usual.  
  · You do not get better as expected. Where can you learn more? Go to http://www.gray.com/ Enter N040 in the search box to learn more about \"Iron Deficiency Anemia: Care Instructions. \" Current as of: September 23, 2020               Content Version: 12.8 © 9451-0579 [employer]. Care instructions adapted under license by [employer] (which disclaims liability or warranty for this information). If you have questions about a medical condition or this instruction, always ask your healthcare professional. [address] any warranty or liability for your use of this information.

## 2021-06-09 NOTE — LETTER
6/9/2021    Patient: Roena Boeck   YOB: 1940   Date of Visit: 6/9/2021     Darry Meckel, NP  [place] [address]  Via Fax: [phone]    Dear Darry Meckel, NP,      Thank you for referring Ms. Roena Boeck to [facility] for evaluation. My notes for this consultation are attached. If you have questions, please do not hesitate to call me. I look forward to following your patient along with you.       Sincerely,    Pj Carpenter NP

## 2021-09-12 NOTE — PROGRESS NOTES
Tommie Escobedo is a 78 y.o. female, evaluated via audio-only technology on 9/14/2021 for Follow-up  iron deficiency anemia. Assessment & Plan:   Iron deficiency Anemia  Anemia of chronic kidney disease stage III  Anemia of chronic disease  -- Completed 2 units of PRBC on  9/10/2020  -- Completed 4 doses of Venofer 10/29/2020   -- 5/27/2021 CBC reported hemoglobin 12.0,  hematocrit 37.6%, total WBC 12.2, and platelet 255,023. Iron profile showed saturation 17% and ferritin 199.  -- Today I have reviewed with the patient about recent lab reports. 9/7/2021 CBC reported hemoglobin 12.5, hematocrit 29%, WBC 10.5, platelet 400,376. Iron saturation 20%, ferritin 138. Plan  -- Continue lab check CBC, Iron profiles, ferritin  -- She was advised to f/u PCP/GI if any further colonoscopy indicated. Thrombocytosis, resolved:   -- 9/7/2021 CBC reported hemoglobin 12.5, hematocrit 29%, WBC 10.5, platelet 513,003. Neutrophilic leukocytosis, resolved. -- 9/7/2021 CBC reported hemoglobin 12.5, hematocrit 29%, WBC 10.5, platelet 547,441. Hypokalemia  -- 9/7/2021 K 3.1  -- She reported she was taking Lasix and potassium replacement. -- Advised the patient to continue potassium replacement and follow up with her PCP for monitoring chemistry. -- We will see the patient back in clinic in about 4 months. Always sooner if required. The patient can have lab done prior to our next clinic visit. 12  Subjective:   Mrs. Bereket Martins is a 78-year-old woman who is  being seen for severe anemia. She has previously completed iron therapy with Venofer at a dose of 250 mg every 2 weeks for 4 doses on 10/29/2020. Completed 2 units of PRBC of 09/10/2020. Today the patient reported doing fine overall. She has no new physical complaints. Denied any bleeding or blood loss. She denied fever, chills, night sweat, unintentional weight loss, skin lumps or bumps, acute bleeding or bruising issues.  Denied headache, acute vision change, dizziness, chest pain, worsen shortness of breath, palpitation, productive cough, nausea, vomiting, abdominal pain, altered bowel habits, dysuria, new bone pain or back pain, focal numbness or weakness. Prior to Admission medications    Medication Sig Start Date End Date Taking? Authorizing Provider   levoFLOXacin (LEVAQUIN) 500 mg tablet Take 1 Tablet by mouth daily. 7/9/21   REID Olmedo   furosemide (LASIX) 20 mg tablet  6/28/21   Provider, Historical   estradioL (ESTRACE) 0.01 % (0.1 mg/gram) vaginal cream Apply 4 g to affected area every Monday, Wednesday, Friday. Apply pea sized amount to urethra 3x a week 7/7/21   REID Olmedo   loratadine (CLARITIN PO) Take  by mouth. Provider, Historical   ELIQUIS 5 mg tablet  9/10/18   Provider, Historical   dilTIAZem (TIAZAC) 360 mg ER capsule  8/5/18   Provider, Historical   mupirocin (BACTROBAN) 2 % ointment  10/4/18   Provider, Historical   hydroxychloroquine (PLAQUENIL) 200 mg tablet  10/3/18   Provider, Historical   lisinopril (PRINIVIL, ZESTRIL) 5 mg tablet Take  by mouth daily. Provider, Historical   potassium chloride (K-DUR, KLOR-CON) 10 mEq tablet Take 1 Tab by mouth daily. 4/13/15   Elodia Henderson NP   naproxen (NAPROSYN) 500 mg tablet Take 500 mg by mouth daily. Provider, Historical   triamcinolone acetonide (KENALOG) 0.1 % ointment Apply  to affected area two (2) times a day. use thin layer    Provider, Historical   docusate sodium (COLACE) 100 mg capsule Take 100 mg by mouth daily. Provider, Historical   traMADol (ULTRAM) 50 mg tablet Take 50 mg by mouth every six (6) hours as needed for Pain. Provider, Historical   aspirin 81 mg tablet 81 Tabs. Take 81 mg by Mouth Once a Day. Provider, Historical   gabapentin (NEURONTIN) 300 mg capsule 300 mg. Take 1 Cap by Mouth 3 Times Daily. 11/6/13   Provider, Historical   esomeprazole (NEXIUM) 40 mg capsule Take 1 Cap by Mouth Once a Day.  2/7/14   Provider, Historical   pramipexole (MIRAPEX) 0.125 mg tablet Take 1 Tab by Mouth 3 Times Daily. 3/10/14   Provider, Historical         Review of Systems   Constitutional: Negative for chills, diaphoresis, fever, malaise/fatigue and weight loss. Respiratory: Negative for cough, hemoptysis, shortness of breath and wheezing. Cardiovascular: Negative for chest pain, palpitations and leg swelling. Gastrointestinal: Negative for abdominal pain, diarrhea, heartburn, nausea and vomiting. Genitourinary: Negative for dysuria, frequency, hematuria and urgency. Musculoskeletal: Negative for joint pain and myalgias. Skin: Negative for itching and rash. Neurological: Negative for dizziness, seizures, weakness and headaches. Psychiatric/Behavioral: Negative for depression. The patient does not have insomnia. No flowsheet data found. Zack Das, who was evaluated through a patient-initiated, synchronous (real-time) audio only encounter, and/or her healthcare decision maker, is aware that it is a billable service, with coverage as determined by her insurance carrier. She provided verbal consent to proceed: Yes. She has not had a related appointment within my department in the past 7 days or scheduled within the next 24 hours. On this date 09/14/2021 I have spent 20 minutes reviewing previous notes, test results and face to face (virtual) with the patient discussing the diagnosis and importance of compliance with the treatment plan as well as documenting on the day of the visit.     Lukas Rajan MD

## 2021-09-14 ENCOUNTER — VIRTUAL VISIT (OUTPATIENT)
Dept: ONCOLOGY | Age: 81
End: 2021-09-14
Payer: MEDICARE

## 2021-09-14 DIAGNOSIS — D72.9 NEUTROPHILIC LEUKOCYTOSIS: ICD-10-CM

## 2021-09-14 DIAGNOSIS — D75.839 THROMBOCYTOSIS: ICD-10-CM

## 2021-09-14 DIAGNOSIS — D50.8 IRON DEFICIENCY ANEMIA SECONDARY TO INADEQUATE DIETARY IRON INTAKE: Primary | ICD-10-CM

## 2021-09-14 PROCEDURE — 99442 PR PHYS/QHP TELEPHONE EVALUATION 11-20 MIN: CPT | Performed by: INTERNAL MEDICINE

## 2022-01-18 ENCOUNTER — VIRTUAL VISIT (OUTPATIENT)
Dept: ONCOLOGY | Age: 82
End: 2022-01-18
Payer: MEDICARE

## 2022-01-18 DIAGNOSIS — D75.839 THROMBOCYTOSIS: ICD-10-CM

## 2022-01-18 DIAGNOSIS — D50.8 IRON DEFICIENCY ANEMIA SECONDARY TO INADEQUATE DIETARY IRON INTAKE: Primary | ICD-10-CM

## 2022-01-18 PROCEDURE — 99442 PR PHYS/QHP TELEPHONE EVALUATION 11-20 MIN: CPT | Performed by: INTERNAL MEDICINE

## 2022-01-18 NOTE — PROGRESS NOTES
Malu Hanks is a 78 y.o. female, evaluated via audio-only technology on 1/18/2022 for Follow-up  . Assessment & Plan:   Diagnoses and all orders for this visit:    1. Iron deficiency anemia secondary to inadequate dietary iron intake    2. Thrombocytosis      The complexity of medical decision making for this visit is moderate. Iron deficiency Anemia  Anemia of chronic disease  -- Completed 2 units of PRBC on  9/10/2020  -- Completed 4 doses of Venofer 10/29/2020   -- 5/27/2021 CBC reported hemoglobin 12.0,  hematocrit 37.6%, total WBC 12.2, and platelet 710,086. Iron profile showed saturation 17% and ferritin 199.  -- 9/7/2021 CBC reported hemoglobin 12.5, hematocrit 29%, WBC 10.5, platelet 602,124. Iron saturation 20%, ferritin 138.  -- Today I have reviewed with the patient about recent lab reports. 1/14/2022 CBC reported hemoglobin 11.8, hematocrit 37%, WBC 12, platelet 862,381. Iron saturation 22%, ferritin 88. She reported chronic fatigue. She has hemorrhoids but denied active bleeding or severe bleeding. She states she will f/u her PCP/GI for this. Plan  -- Iron oral replacement: advised the patient to increase to TID if tolerates. -- Continue lab check CBC, Iron profiles, ferritin  -- She was advised to f/u PCP/GI for her hemorrhoids management. Thrombocytosis, resolved:   -- Likely reactive 2/2 COLIN  -- 9/7/2021 CBC reported hemoglobin 12.5, hematocrit 29%, WBC 10.5, platelet 936,710.  -- 1/14/2022 CBC reported hemoglobin 11.8, hematocrit 37%, WBC 12, platelet 494,346.        -- We will see the patient back in clinic in about 3 months. Always sooner if required. The patient can have lab done prior to our next clinic visit. 12  Subjective:   Mrs. Emmanuel Garcia is a 64-year-old woman who is  being seen for severe anemia. She has previously completed iron therapy with Venofer at a dose of 250 mg every 2 weeks for 4 doses on 10/29/2020. Completed 2 units of PRBC of 09/10/2020.    Today she reported chronic fatigue. She has hemorrhoids but denied active bleeding or severe bleeding. She states she will f/u her PCP/GI for this. Otherwise she denied any bleeding, fever, chills, night sweat, unintentional weight loss, skin lumps or bumps, acute bleeding or bruising issues. Denied headache, acute vision change, dizziness, chest pain, worsen shortness of breath, palpitation, productive cough, nausea, vomiting, abdominal pain, altered bowel habits, dysuria, new bone pain or back pain, focal numbness or weakness. Prior to Admission medications    Medication Sig Start Date End Date Taking? Authorizing Provider   levoFLOXacin (LEVAQUIN) 500 mg tablet Take 1 Tablet by mouth daily. 7/9/21   REID Tello   furosemide (LASIX) 20 mg tablet  6/28/21   Provider, Historical   estradioL (ESTRACE) 0.01 % (0.1 mg/gram) vaginal cream Apply 4 g to affected area every Monday, Wednesday, Friday. Apply pea sized amount to urethra 3x a week 7/7/21   REID Tello   loratadine (CLARITIN PO) Take  by mouth. Provider, Historical   ELIQUIS 5 mg tablet  9/10/18   Provider, Historical   dilTIAZem (TIAZAC) 360 mg ER capsule  8/5/18   Provider, Historical   mupirocin (BACTROBAN) 2 % ointment  10/4/18   Provider, Historical   hydroxychloroquine (PLAQUENIL) 200 mg tablet  10/3/18   Provider, Historical   lisinopril (PRINIVIL, ZESTRIL) 5 mg tablet Take  by mouth daily. Provider, Historical   potassium chloride (K-DUR, KLOR-CON) 10 mEq tablet Take 1 Tab by mouth daily. 4/13/15   Elodia Henderson NP   naproxen (NAPROSYN) 500 mg tablet Take 500 mg by mouth daily. Provider, Historical   triamcinolone acetonide (KENALOG) 0.1 % ointment Apply  to affected area two (2) times a day. use thin layer    Provider, Historical   docusate sodium (COLACE) 100 mg capsule Take 100 mg by mouth daily. Provider, Historical   traMADol (ULTRAM) 50 mg tablet Take 50 mg by mouth every six (6) hours as needed for Pain.     Provider, Historical   aspirin 81 mg tablet 81 Tabs. Take 81 mg by Mouth Once a Day. Provider, Historical   gabapentin (NEURONTIN) 300 mg capsule 300 mg. Take 1 Cap by Mouth 3 Times Daily. 11/6/13   Provider, Historical   esomeprazole (NEXIUM) 40 mg capsule Take 1 Cap by Mouth Once a Day. 2/7/14   Provider, Historical   pramipexole (MIRAPEX) 0.125 mg tablet Take 1 Tab by Mouth 3 Times Daily.  3/10/14   Provider, Historical     Patient Active Problem List   Diagnosis Code    Nonspecific abnormal electrocardiogram (ECG) (EKG) R94.31    Essential hypertension, benign I10    Pre-operative cardiovascular examination Z01.810    Undiagnosed cardiac murmurs R01.1    Mitral valve disorders(424.0) I05.9    Edema R60.9    Nausea and vomiting in adult R11.2    Localized osteoarthrosis not specified whether primary or secondary, lower leg M17.10    Hyperkalemia E87.5    GERD (gastroesophageal reflux disease) K21.9    DJD (degenerative joint disease) M19.90    Disorder of bone and cartilage M89.9, Q97.4    Diastolic dysfunction with chronic heart failure (HCC) I50.32    Community acquired pneumonia of left lower lobe of lung J18.9    Chest tightness R07.89    Chest pain R07.9    CAP (community acquired pneumonia) J18.9    A-fib ([address] I48.91    Abdominal pain R10.9    Paroxysmal atrial fibrillation (HCC) I48.0    Postoperative anemia due to acute blood loss D62    RLS (restless legs syndrome) G25.81    Sepsis ([facility]) A41.9    Malignant neoplasm of skin C44.90    Iron deficiency anemia secondary to inadequate dietary iron intake D50.8    Thrombocytosis N17.560    Neutrophilic leukocytosis E63.0    Acute pain of left shoulder M25.512    MONCHO (acute kidney injury) ([facility]) N17.9    Arthritis of knee, left M17.12    Dehydration E86.0    GI bleed K92.2    Hyperglycemia R73.9    Hypomagnesemia E83.42    Moderate single current episode of major depressive disorder ([facility]) F32.1    S/P total knee arthroplasty, left D80.116    UTI due to extended-spectrum beta lactamase (ESBL) producing Escherichia coli N39.0, B96.29, Z16.12    UTI (urinary tract infection) N39.0    CKD (chronic kidney disease) stage 3, GFR 30-59 ml/min ([facility]) N18.30    Debility R53.81    Frailty R54    Rheumatoid arthritis ([facility]) M06.9    Type 2 diabetes mellitus with microalbuminuria, without long-term current use of insulin ([facility]) E11.29, R80.9       Review of Systems   Constitutional: Positive for malaise/fatigue. Negative for chills, diaphoresis, fever and weight loss. Respiratory: Negative for cough, hemoptysis, shortness of breath and wheezing. Cardiovascular: Negative for chest pain, palpitations and leg swelling. Gastrointestinal: Negative for abdominal pain, diarrhea, heartburn, nausea and vomiting. Genitourinary: Negative for dysuria, frequency, hematuria and urgency. Musculoskeletal: Negative for joint pain and myalgias. Skin: Negative for itching and rash. Neurological: Negative for dizziness, seizures, weakness and headaches. Psychiatric/Behavioral: Negative for depression. The patient does not have insomnia. Patient-Reported Vitals 9/14/2021   Patient-Reported Weight 180lb         The patient was advised that our priority at this time is to keep the patients safe, and therefore we are reaching out to patients virtually to prevent them coming into the office unless necessarily. Patient verbalized understanding and was agreeable for virtual visit. Lala Gipson, who was evaluated through a patient-initiated, synchronous (real-time) audio only encounter, and/or her healthcare decision maker, is aware that it is a billable service, with coverage as determined by her insurance carrier. She provided verbal consent to proceed: Yes. She has not had a related appointment within my department in the past 7 days or scheduled within the next 24 hours.     On this date 01/18/2022 I have spent 20 minutes reviewing previous notes, test results and face to face (virtual) with the patient discussing the diagnosis and importance of compliance with the treatment plan as well as documenting on the day of the visit.     Benny Leija MD

## 2022-03-18 PROBLEM — N39.0 UTI DUE TO EXTENDED-SPECTRUM BETA LACTAMASE (ESBL) PRODUCING ESCHERICHIA COLI: Status: ACTIVE | Noted: 2018-12-09

## 2022-03-18 PROBLEM — Z16.12 UTI DUE TO EXTENDED-SPECTRUM BETA LACTAMASE (ESBL) PRODUCING ESCHERICHIA COLI: Status: ACTIVE | Noted: 2018-12-09

## 2022-03-18 PROBLEM — R54 FRAILTY: Status: ACTIVE | Noted: 2020-09-03

## 2022-03-18 PROBLEM — B96.29 UTI DUE TO EXTENDED-SPECTRUM BETA LACTAMASE (ESBL) PRODUCING ESCHERICHIA COLI: Status: ACTIVE | Noted: 2018-12-09

## 2022-03-18 PROBLEM — E83.42 HYPOMAGNESEMIA: Status: ACTIVE | Noted: 2018-11-01

## 2022-03-18 PROBLEM — D75.839 THROMBOCYTOSIS: Status: ACTIVE | Noted: 2018-10-10

## 2022-03-18 PROBLEM — R53.81 DEBILITY: Status: ACTIVE | Noted: 2020-09-03

## 2022-03-19 PROBLEM — I50.32 DIASTOLIC DYSFUNCTION WITH CHRONIC HEART FAILURE (HCC): Status: ACTIVE | Noted: 2018-05-01

## 2022-03-19 PROBLEM — F32.1 MODERATE SINGLE CURRENT EPISODE OF MAJOR DEPRESSIVE DISORDER (HCC): Status: ACTIVE | Noted: 2019-01-31

## 2022-03-19 PROBLEM — M25.512 ACUTE PAIN OF LEFT SHOULDER: Status: ACTIVE | Noted: 2018-11-02

## 2022-03-19 PROBLEM — I48.91 A-FIB (HCC): Status: ACTIVE | Noted: 2018-06-29

## 2022-03-19 PROBLEM — D72.9 NEUTROPHILIC LEUKOCYTOSIS: Status: ACTIVE | Noted: 2018-10-10

## 2022-03-19 PROBLEM — E86.0 DEHYDRATION: Status: ACTIVE | Noted: 2018-12-06

## 2022-03-19 PROBLEM — K92.2 GI BLEED: Status: ACTIVE | Noted: 2018-11-01

## 2022-03-19 PROBLEM — R80.9 TYPE 2 DIABETES MELLITUS WITH MICROALBUMINURIA, WITHOUT LONG-TERM CURRENT USE OF INSULIN (HCC): Status: ACTIVE | Noted: 2018-05-01

## 2022-03-19 PROBLEM — R07.9 CHEST PAIN: Status: ACTIVE | Noted: 2018-03-09

## 2022-03-19 PROBLEM — Z96.652 S/P TOTAL KNEE ARTHROPLASTY, LEFT: Status: ACTIVE | Noted: 2019-04-05

## 2022-03-19 PROBLEM — D50.8 IRON DEFICIENCY ANEMIA SECONDARY TO INADEQUATE DIETARY IRON INTAKE: Status: ACTIVE | Noted: 2018-10-10

## 2022-03-19 PROBLEM — J18.9 COMMUNITY ACQUIRED PNEUMONIA OF LEFT LOWER LOBE OF LUNG: Status: ACTIVE | Noted: 2017-10-18

## 2022-03-19 PROBLEM — R73.9 HYPERGLYCEMIA: Status: ACTIVE | Noted: 2018-11-01

## 2022-03-19 PROBLEM — M17.12 ARTHRITIS OF KNEE, LEFT: Status: ACTIVE | Noted: 2019-04-02

## 2022-03-19 PROBLEM — I48.0 PAROXYSMAL ATRIAL FIBRILLATION (HCC): Status: ACTIVE | Noted: 2017-10-25

## 2022-03-19 PROBLEM — R07.89 CHEST TIGHTNESS: Status: ACTIVE | Noted: 2018-10-10

## 2022-03-19 PROBLEM — E11.29 TYPE 2 DIABETES MELLITUS WITH MICROALBUMINURIA, WITHOUT LONG-TERM CURRENT USE OF INSULIN (HCC): Status: ACTIVE | Noted: 2018-05-01

## 2022-03-19 PROBLEM — A41.9 SEPSIS (HCC): Status: ACTIVE | Noted: 2017-10-18

## 2022-03-19 PROBLEM — N18.30 CKD (CHRONIC KIDNEY DISEASE) STAGE 3, GFR 30-59 ML/MIN (HCC): Status: ACTIVE | Noted: 2019-09-23

## 2022-03-20 PROBLEM — M06.9 RHEUMATOID ARTHRITIS (HCC): Status: ACTIVE | Noted: 2020-08-01

## 2022-03-20 PROBLEM — J18.9 CAP (COMMUNITY ACQUIRED PNEUMONIA): Status: ACTIVE | Noted: 2017-10-18

## 2022-03-20 PROBLEM — N17.9 AKI (ACUTE KIDNEY INJURY) (HCC): Status: ACTIVE | Noted: 2018-11-01

## 2022-03-20 PROBLEM — N39.0 UTI (URINARY TRACT INFECTION): Status: ACTIVE | Noted: 2018-12-06

## 2022-04-26 ENCOUNTER — VIRTUAL VISIT (OUTPATIENT)
Dept: ONCOLOGY | Age: 82
End: 2022-04-26
Payer: MEDICARE

## 2022-04-26 DIAGNOSIS — D72.9 NEUTROPHILIC LEUKOCYTOSIS: ICD-10-CM

## 2022-04-26 DIAGNOSIS — N18.30 ANEMIA DUE TO STAGE 3 CHRONIC KIDNEY DISEASE, UNSPECIFIED WHETHER STAGE 3A OR 3B CKD (HCC): ICD-10-CM

## 2022-04-26 DIAGNOSIS — D50.8 IRON DEFICIENCY ANEMIA SECONDARY TO INADEQUATE DIETARY IRON INTAKE: Primary | ICD-10-CM

## 2022-04-26 DIAGNOSIS — D75.839 THROMBOCYTOSIS: ICD-10-CM

## 2022-04-26 DIAGNOSIS — D47.3 ESSENTIAL (HEMORRHAGIC) THROMBOCYTHEMIA (HCC): ICD-10-CM

## 2022-04-26 DIAGNOSIS — D63.1 ANEMIA DUE TO STAGE 3 CHRONIC KIDNEY DISEASE, UNSPECIFIED WHETHER STAGE 3A OR 3B CKD (HCC): ICD-10-CM

## 2022-04-26 PROCEDURE — 99442 PR PHYS/QHP TELEPHONE EVALUATION 11-20 MIN: CPT | Performed by: NURSE PRACTITIONER

## 2022-04-26 RX ORDER — SODIUM CHLORIDE 0.9 % (FLUSH) 0.9 %
10 SYRINGE (ML) INJECTION AS NEEDED
Status: CANCELLED | OUTPATIENT
Start: 2022-04-26

## 2022-04-26 RX ORDER — ACETAMINOPHEN 325 MG/1
650 TABLET ORAL AS NEEDED
Status: CANCELLED
Start: 2022-04-26

## 2022-04-26 RX ORDER — SODIUM CHLORIDE 9 MG/ML
25 INJECTION, SOLUTION INTRAVENOUS CONTINUOUS
Status: CANCELLED | OUTPATIENT
Start: 2022-04-26

## 2022-04-26 RX ORDER — DIPHENHYDRAMINE HYDROCHLORIDE 50 MG/ML
25 INJECTION, SOLUTION INTRAMUSCULAR; INTRAVENOUS AS NEEDED
Status: CANCELLED
Start: 2022-04-26

## 2022-04-26 RX ORDER — EPINEPHRINE 1 MG/ML
0.3 INJECTION, SOLUTION, CONCENTRATE INTRAVENOUS AS NEEDED
Status: CANCELLED | OUTPATIENT
Start: 2022-04-26

## 2022-04-26 RX ORDER — DIPHENHYDRAMINE HYDROCHLORIDE 50 MG/ML
50 INJECTION, SOLUTION INTRAMUSCULAR; INTRAVENOUS AS NEEDED
Status: CANCELLED
Start: 2022-04-26

## 2022-04-26 RX ORDER — ALBUTEROL SULFATE 0.83 MG/ML
2.5 SOLUTION RESPIRATORY (INHALATION) AS NEEDED
Status: CANCELLED
Start: 2022-04-26

## 2022-04-26 RX ORDER — HEPARIN 100 UNIT/ML
300-500 SYRINGE INTRAVENOUS AS NEEDED
Status: CANCELLED
Start: 2022-04-26

## 2022-04-26 RX ORDER — HYDROCORTISONE SODIUM SUCCINATE 100 MG/2ML
100 INJECTION, POWDER, FOR SOLUTION INTRAMUSCULAR; INTRAVENOUS AS NEEDED
Status: CANCELLED | OUTPATIENT
Start: 2022-04-26

## 2022-04-26 RX ORDER — SODIUM CHLORIDE 9 MG/ML
10 INJECTION INTRAMUSCULAR; INTRAVENOUS; SUBCUTANEOUS AS NEEDED
Status: CANCELLED | OUTPATIENT
Start: 2022-04-26

## 2022-04-26 RX ORDER — ONDANSETRON 2 MG/ML
8 INJECTION INTRAMUSCULAR; INTRAVENOUS AS NEEDED
Status: CANCELLED | OUTPATIENT
Start: 2022-04-26

## 2022-04-26 NOTE — PROGRESS NOTES
Bucky Michaud is a 78 y.o. female, evaluated via audio-only technology on 4/26/2022 for Follow-up  . Assessment & Plan:   Iron deficiency Anemia  Anemia of chronic disease  -- Completed 2 units of PRBC on  9/10/2020  -- Completed 4 doses of Venofer 10/29/2020   -- 5/27/2021 CBC reported hemoglobin 12.0,  hematocrit 37.6%, total WBC 12.2, and platelet 515,771. Iron profile showed saturation 17% and ferritin 199.  -- 9/7/2021 CBC reported hemoglobin 12.5, hematocrit 29%, WBC 10.5, platelet 265,315. Iron saturation 20%, ferritin 138.  -- Today I have reviewed with the patient about recent lab reports. 4/13/2022 CBC reported hemoglobin 11.4, hematocrit 36.9%, WBC 10.3, platelet 009,825. Iron saturation 13%, ferritin 53. Creatinine 1.5, eGFR 32.4  She reported chronic fatigue. She has hemorrhoids but denied active bleeding or severe bleeding. She states she will f/u her PCP/GI for this.     Plan  -- Iron oral replacement: advised the patient to increase to TID if tolerates. --Venofer x 3 doses ordered. Indication and side effect profile reviewed with patient. She will like to get her iron infusion at  [facility] due to distance and location of her house   --  Continue lab check CBC, Iron profiles, ferritin  -- She was advised to f/u PCP/GI for her hemorrhoids management.        Thrombocytosis, resolved:   -- Likely reactive 2/2 COLIN  --- 04/13/2022 CBC reported hemoglobin 11.4, hematocrit 36.4%, WBC 10.3, platelet 128,458.           -- We will see the patient back in clinic in about 4 months. Always sooner if required. The patient can have lab done prior to our next clinic visit.       12  Subjective:   Mrs. Alphia Phalen is a 78-year-old woman who is  being seen for severe anemia. She has previously completed iron therapy with Venofer at a dose of 250 mg every 2 weeks for 4 doses on 10/29/2020. Completed 2 units of PRBC of 09/10/2020. Today she reported chronic fatigue.  She has hemorrhoids but denied active bleeding or severe bleeding. She states she will f/u her PCP/GI for this. Today she denied any bleeding, fever, chills, night sweat, unintentional weight loss, skin lumps or bumps, acute bleeding or bruising issues. Denied headache, acute vision change, dizziness, chest pain, worsen shortness of breath, palpitation, productive cough, nausea, vomiting, abdominal pain, altered bowel habits, dysuria, new bone pain or back pain, focal numbness or weakness    Prior to Admission medications    Medication Sig Start Date End Date Taking? Authorizing Provider   albuterol (PROVENTIL HFA, VENTOLIN HFA, PROAIR HFA) 90 mcg/actuation inhaler Take 2 Puffs by inhalation. 2/10/22   Provider, Historical   methotrexate (RHEUMATREX) 2.5 mg tablet TAKE 4 TABLET BY ORAL ROUTE EVERY WEEK (ALL THE SAME DAY) 1/14/22   Provider, Historical   predniSONE (DELTASONE) 5 mg tablet Take 5 mg by mouth daily. 1/19/22   Provider, Historical   levoFLOXacin (LEVAQUIN) 500 mg tablet Take 1 Tablet by mouth daily. 7/9/21   REID Chandler   furosemide (LASIX) 20 mg tablet  6/28/21   Provider, Historical   estradioL (ESTRACE) 0.01 % (0.1 mg/gram) vaginal cream Apply 4 g to affected area every Monday, Wednesday, Friday. Apply pea sized amount to urethra 3x a week 7/7/21   REID Chandler   loratadine (CLARITIN PO) Take  by mouth. Provider, Historical   ELIQUIS 5 mg tablet  9/10/18   Provider, Historical   dilTIAZem (TIAZAC) 360 mg ER capsule  8/5/18   Provider, Historical   mupirocin (BACTROBAN) 2 % ointment  10/4/18   Provider, Historical   hydroxychloroquine (PLAQUENIL) 200 mg tablet  10/3/18   Provider, Historical   lisinopril (PRINIVIL, ZESTRIL) 5 mg tablet Take  by mouth daily. Provider, Historical   potassium chloride (K-DUR, KLOR-CON) 10 mEq tablet Take 1 Tab by mouth daily. 4/13/15   Elodia Henderson, NP   naproxen (NAPROSYN) 500 mg tablet Take 500 mg by mouth daily.     Provider, Historical   triamcinolone acetonide (KENALOG) 0.1 % ointment Apply  to affected area two (2) times a day. use thin layer    Provider, Historical   docusate sodium (COLACE) 100 mg capsule Take 100 mg by mouth daily. Provider, Historical   traMADol (ULTRAM) 50 mg tablet Take 50 mg by mouth every six (6) hours as needed for Pain. Provider, Historical   aspirin 81 mg tablet 81 Tabs. Take 81 mg by Mouth Once a Day. Provider, Historical   gabapentin (NEURONTIN) 300 mg capsule 300 mg. Take 1 Cap by Mouth 3 Times Daily. 11/6/13   Provider, Historical   esomeprazole (NEXIUM) 40 mg capsule Take 1 Cap by Mouth Once a Day. 2/7/14   Provider, Historical   pramipexole (MIRAPEX) 0.125 mg tablet Take 1 Tab by Mouth 3 Times Daily. 3/10/14   Provider, Historical         Review of Systems   Constitutional: Positive for malaise/fatigue. HENT: Negative. Eyes: Negative. Respiratory: Negative. Cardiovascular: Negative. Gastrointestinal: Negative. Genitourinary: Negative. Musculoskeletal: Negative. Skin: Negative. Neurological: Negative. Endo/Heme/Allergies: Negative. Psychiatric/Behavioral: Negative. Patient-Reported Weight: 185lb       Mane Rao was evaluated through a patient-initiated, synchronous (real-time) audio only encounter. She (or guardian if applicable) is aware that it is a billable service, which includes applicable co-pays, with coverage as determined by her insurance carrier. This visit was conducted with the patient's (and/or Ritu Aponte guardian's) verbal consent. She has not had a related appointment within my department in the past 7 days or scheduled within the next 24 hours. The patient was located in a state where the provider was licensed to provide care.     Total Time: 20 min     Brandon Watson DNP

## 2022-05-06 RX ORDER — ACETAMINOPHEN 500 MG
500 TABLET ORAL
COMMUNITY

## 2022-05-06 NOTE — PERIOP NOTES
PRE-SURGICAL INSTRUCTIONS        Patient's Name:  Yeny Jewell      Today's Date:  5/6/2022      Surgery Date:  5/10/2022                1. Do NOT eat or drink anything, including candy, gum, or ice chips after midnight on 5/9/2022, unless you have specific instructions from your surgeon or anesthesia provider to do so.  2. You may brush your teeth before coming to the hospital.  3. No smoking 24 hours prior to the day of surgery. 4. No alcohol 24 hours prior to the day of surgery. 5. No recreational drugs for one week prior to the day of surgery. 6. Leave all valuables, including money/purse, at home. 7. Remove all jewelry, nail polish, acrylic nails, and makeup (including mascara); no lotions powders, deodorant, or perfume/cologne/after shave on the skin. 8. Follow instruction for Hibiclens washes and CHG wipes from surgeon's office. 9. Glasses/contact lenses and dentures may be worn to the hospital.  They will be removed prior to surgery. 10. Call your doctor if symptoms of a cold or illness develop within 24-48 hours prior to your surgery. 11.  If you are having an outpatient procedure, please make arrangements for a responsible ADULT TO [address] [place] [address] and stay with you for 24 hours after your surgery. 12. ONE VISITOR in the hospital at this time for outpatient procedures. Exceptions may be made for surgical admissions, per nursing unit guidelines      Special Instructions:      Bring list of CURRENT medications. Bring inhaler. Bring any pertinent legal medical records. Take these medications the morning of surgery with a sip of water: medications as directed by physician. Follow physician instructions about stopping anticoagulants. Complete bowel prep per MD instructions. On the day of surgery, come in the main entrance of [facility]. Let the  at the desk know you are there for surgery.   A staff member will come escort you to the surgical area on the second floor. If you have any questions or concerns, please do not hesitate to call:     (Prior to the day of surgery) PAT department:  [phone]   (Day of surgery) Pre-Op department:  [phone]    These surgical instructions were reviewed with patient during the PAT phone call.

## 2022-05-09 ENCOUNTER — ANESTHESIA EVENT (OUTPATIENT)
Dept: ENDOSCOPY | Age: 82
End: 2022-05-09
Payer: MEDICARE

## 2022-05-10 ENCOUNTER — HOSPITAL ENCOUNTER (OUTPATIENT)
Age: 82
Setting detail: OUTPATIENT SURGERY
Discharge: HOME | End: 2022-05-10
Attending: INTERNAL MEDICINE | Admitting: INTERNAL MEDICINE
Payer: MEDICARE

## 2022-05-10 ENCOUNTER — ANESTHESIA (OUTPATIENT)
Dept: ENDOSCOPY | Age: 82
End: 2022-05-10
Payer: MEDICARE

## 2022-05-10 VITALS
BODY MASS INDEX: 30.9 KG/M2 | TEMPERATURE: 97.1 F | HEART RATE: 84 BPM | WEIGHT: 181 LBS | HEIGHT: 64 IN | SYSTOLIC BLOOD PRESSURE: 147 MMHG | DIASTOLIC BLOOD PRESSURE: 72 MMHG | OXYGEN SATURATION: 92 % | RESPIRATION RATE: 16 BRPM

## 2022-05-10 PROCEDURE — 74011000250 HC RX REV CODE- 250: Performed by: NURSE ANESTHETIST, CERTIFIED REGISTERED

## 2022-05-10 PROCEDURE — 76060000032 HC ANESTHESIA 0.5 TO 1 HR: Performed by: INTERNAL MEDICINE

## 2022-05-10 PROCEDURE — 77030021593 HC FCPS BIOP ENDOSC BSC -A: Performed by: INTERNAL MEDICINE

## 2022-05-10 PROCEDURE — 74011250636 HC RX REV CODE- 250/636: Performed by: NURSE ANESTHETIST, CERTIFIED REGISTERED

## 2022-05-10 PROCEDURE — 00813 ANES UPR LWR GI NDSC PX: CPT | Performed by: ANESTHESIOLOGY

## 2022-05-10 PROCEDURE — 99100 ANES PT EXTEME AGE<1 YR&>70: CPT | Performed by: ANESTHESIOLOGY

## 2022-05-10 PROCEDURE — 99100 ANES PT EXTEME AGE<1 YR&>70: CPT | Performed by: NURSE ANESTHETIST, CERTIFIED REGISTERED

## 2022-05-10 PROCEDURE — 77030008565 HC TBNG SUC IRR ERBE -B: Performed by: INTERNAL MEDICINE

## 2022-05-10 PROCEDURE — 2709999900 HC NON-CHARGEABLE SUPPLY: Performed by: INTERNAL MEDICINE

## 2022-05-10 PROCEDURE — 00813 ANES UPR LWR GI NDSC PX: CPT | Performed by: NURSE ANESTHETIST, CERTIFIED REGISTERED

## 2022-05-10 PROCEDURE — 77030013992 HC SNR POLYP ENDOSC BSC -B: Performed by: INTERNAL MEDICINE

## 2022-05-10 PROCEDURE — 76040000007: Performed by: INTERNAL MEDICINE

## 2022-05-10 RX ORDER — PROPOFOL 10 MG/ML
INJECTION, EMULSION INTRAVENOUS AS NEEDED
Status: DISCONTINUED | OUTPATIENT
Start: 2022-05-10 | End: 2022-05-10 | Stop reason: HOSPADM

## 2022-05-10 RX ORDER — SODIUM CHLORIDE, SODIUM LACTATE, POTASSIUM CHLORIDE, CALCIUM CHLORIDE 600; 310; 30; 20 MG/100ML; MG/100ML; MG/100ML; MG/100ML
50 INJECTION, SOLUTION INTRAVENOUS CONTINUOUS
Status: DISCONTINUED | OUTPATIENT
Start: 2022-05-10 | End: 2022-05-10 | Stop reason: HOSPADM

## 2022-05-10 RX ORDER — LIDOCAINE HYDROCHLORIDE 20 MG/ML
INJECTION, SOLUTION EPIDURAL; INFILTRATION; INTRACAUDAL; PERINEURAL AS NEEDED
Status: DISCONTINUED | OUTPATIENT
Start: 2022-05-10 | End: 2022-05-10 | Stop reason: HOSPADM

## 2022-05-10 RX ORDER — LIDOCAINE HYDROCHLORIDE 10 MG/ML
0.1 INJECTION, SOLUTION EPIDURAL; INFILTRATION; INTRACAUDAL; PERINEURAL AS NEEDED
Status: DISCONTINUED | OUTPATIENT
Start: 2022-05-10 | End: 2022-05-10 | Stop reason: HOSPADM

## 2022-05-10 RX ADMIN — PROPOFOL 50 MG: 10 INJECTION, EMULSION INTRAVENOUS at 14:27

## 2022-05-10 RX ADMIN — PROPOFOL 30 MG: 10 INJECTION, EMULSION INTRAVENOUS at 14:30

## 2022-05-10 RX ADMIN — SODIUM CHLORIDE, SODIUM LACTATE, POTASSIUM CHLORIDE, AND CALCIUM CHLORIDE 50 ML/HR: 600; 310; 30; 20 INJECTION, SOLUTION INTRAVENOUS at 14:00

## 2022-05-10 RX ADMIN — LIDOCAINE HYDROCHLORIDE 60 MG: 20 INJECTION, SOLUTION EPIDURAL; INFILTRATION; INTRACAUDAL; PERINEURAL at 14:27

## 2022-05-10 RX ADMIN — PROPOFOL 20 MG: 10 INJECTION, EMULSION INTRAVENOUS at 14:32

## 2022-05-10 RX ADMIN — FAMOTIDINE 20 MG: 10 INJECTION INTRAVENOUS at 14:00

## 2022-05-10 NOTE — H&P
[email or web address]  [phone]    GASTROENTEROLOGY Pre-Procedure H and P      Impression/Plan:   1.  This patient is consented for an EGD and flex sig for dysphagia and bleeding       Chief Complaint: dysphagia and bleeding    HPI:  Bary Lennox is a 78 y.o. female who is being is having an EGD and flex sig for dysphagia and bleeding  PMH:   Past Medical History:   Diagnosis Date    Arrhythmia     Paroxysmal atrial fibrillation     Chronic kidney disease     Stage 3b chronic kidney disease     Edema 10/9/2014    sec to dependency, chf, mr, knee surgeries & norvasc reduce salt & fluid as no other Sx now     Essential hypertension     GERD (gastroesophageal reflux disease)     Malignant neoplasm of skin 11/12/2010    Mitral valve disorder     Murmur, cardiac     Other iron deficiency anemias     Restless legs syndrome     RHA (rheumatoid arthritis) ([address]     SOB (shortness of breath) on exertion     Type 2 diabetes mellitus with microalbuminuria, without long-term current use of insulin ([address] 5/1/2018    no medication       PSH:   Past Surgical History:   Procedure Laterality Date    HX CATARACT REMOVAL Bilateral     HX HEENT      Skin grafting to nose    HX HYSTERECTOMY      HX KNEE REPLACEMENT Bilateral     HX ORTHOPAEDIC Bilateral     bilateral feet surgery       Social HX:   Social History     Socioeconomic History    Marital status:      Spouse name: Not on file    Number of children: Not on file    Years of education: Not on file    Highest education level: Not on file   Occupational History    Not on file   Tobacco Use    Smoking status: Never Smoker    Smokeless tobacco: Never Used   Vaping Use    Vaping Use: Never used   Substance and Sexual Activity    Alcohol use: No    Drug use: No    Sexual activity: Not on file   Other Topics Concern    Not on file   Social History Narrative    Not on file     Social Determinants of Health     Financial Resource Strain:    [facility] Difficulty of Paying Living Expenses: Not on file   Food Insecurity:     Worried About Running Out of Food in the Last Year: Not on file    Ran Out of Food in the Last Year: Not on file   Transportation Needs:     Lack of Transportation (Medical): Not on file    Lack of Transportation (Non-Medical):  Not on file   Physical Activity:     Days of Exercise per Week: Not on file    Minutes of Exercise per Session: Not on file   Stress:     Feeling of Stress : Not on file   Social Connections:     Frequency of Communication with Friends and Family: Not on file    Frequency of Social Gatherings with Friends and Family: Not on file    Attends Sikhism Services: Not on file    Active Member of [address] [place] [address] [employer] or Organizations: Not on file    Attends Club or Organization Meetings: Not on file    Marital Status: Not on file   Intimate Partner Violence:     Fear of Current or Ex-Partner: Not on file    Emotionally Abused: Not on file    Physically Abused: Not on file    Sexually Abused: Not on file   Housing Stability:     Unable to Pay for Housing in the Last Year: Not on file    Number of [place] in the Last Year: Not on file    Unstable Housing in the Last Year: Not on file       FHX:   Family History   Problem Relation Age of Onset    Heart Surgery Neg Hx     Heart Attack Neg Hx     Stroke Neg Hx        Allergy:   Allergies   Allergen Reactions    Amoxicillin Other (comments)    Cyclobenzaprine Other (comments)    Macrobid [Nitrofurantoin Monohyd/M-Cryst] Other (comments)     Pt reports chills and fever    Sulfa (Sulfonamide Antibiotics) Other (comments) and Itching     Per patient stated made her feel bad      Ciprofloxacin Nausea Only, Other (comments) and Itching    Penicillins Rash, Nausea Only, Other (comments) and Itching     Nausea/upset stomach   Pt states they make her sick to her stomach         Home Medications:     Medications Prior to Admission   Medication Sig    acetaminophen (Tylenol Extra Strength) 500 mg tablet Take 500 mg by mouth every six (6) hours as needed for Pain.  albuterol (PROVENTIL HFA, VENTOLIN HFA, PROAIR HFA) 90 mcg/actuation inhaler Take 2 Puffs by inhalation.  furosemide (LASIX) 20 mg tablet     estradioL (ESTRACE) 0.01 % (0.1 mg/gram) vaginal cream Apply 4 g to affected area every Monday, Wednesday, Friday. Apply pea sized amount to urethra 3x a week    loratadine (CLARITIN PO) Take  by mouth.  dilTIAZem (TIAZAC) 360 mg ER capsule     mupirocin (BACTROBAN) 2 % ointment     potassium chloride (K-DUR, KLOR-CON) 10 mEq tablet Take 1 Tab by mouth daily.  triamcinolone acetonide (KENALOG) 0.1 % ointment Apply  to affected area two (2) times a day. use thin layer    docusate sodium (COLACE) 100 mg capsule Take 100 mg by mouth daily.  traMADol (ULTRAM) 50 mg tablet Take 50 mg by mouth every six (6) hours as needed for Pain.  gabapentin (NEURONTIN) 300 mg capsule 300 mg. Take 1 Cap by Mouth 3 Times Daily.  esomeprazole (NEXIUM) 40 mg capsule Take 1 Cap by Mouth Once a Day.  pramipexole (MIRAPEX) 0.125 mg tablet Take 1 Tab by Mouth 3 Times Daily.  methotrexate (RHEUMATREX) 2.5 mg tablet TAKE 4 TABLET BY ORAL ROUTE EVERY WEEK (ALL THE SAME DAY) (Patient not taking: Reported on 5/6/2022)    predniSONE (DELTASONE) 5 mg tablet Take 5 mg by mouth daily. (Patient not taking: Reported on 5/6/2022)    ELIQUIS 5 mg tablet     hydroxychloroquine (PLAQUENIL) 200 mg tablet  (Patient not taking: Reported on 7/7/2021)    lisinopril (PRINIVIL, ZESTRIL) 5 mg tablet Take  by mouth daily. (Patient not taking: Reported on 5/6/2022)    naproxen (NAPROSYN) 500 mg tablet Take 500 mg by mouth daily. (Patient not taking: Reported on 5/6/2022)    aspirin 81 mg tablet 81 Tabs. Take 81 mg by Mouth Once a Day. (Patient not taking: Reported on 5/6/2022)       Review of Systems:     Constitutional: No fevers, chills, weight loss, fatigue.    Skin: No rashes, pruritis, jaundice, ulcerations, erythema. HENT: No headaches, nosebleeds, sinus pressure, rhinorrhea, sore throat. Eyes: No visual changes, blurred vision, eye pain, photophobia, jaundice. Cardiovascular: No chest pain, heart palpitations. Respiratory: No cough, SOB, wheezing, chest discomfort, orthopnea. Gastrointestinal: Neg unless noted otherwise in H&P   Genitourinary: No dysuria, bleeding, discharge, pyuria. Musculoskeletal: No weakness, arthralgias, wasting. Endo: No sweats. Heme: No bruising, easy bleeding. Allergies: As noted. Neurological: Cranial nerves intact. Alert and oriented. Gait not assessed. Psychiatric:  No anxiety, depression, hallucinations. Visit Vitals  Ht 5' 4\" (1.626 m)   Wt 83.9 kg (185 lb)   BMI 31.76 kg/m²       Physical Assessment:     constitutional: appearance: well developed, well nourished, normal habitus, no deformities, in no acute distress. skin: inspection: no rashes, ulcers, icterus or other lesions; no clubbing or telangiectasias. palpation: no induration or subcutaneos nodules. eyes: inspection: normal conjunctivae and lids; no jaundice pupils: normal  ENMT: mouth: normal oral mucosa,lips and gums; good dentition. oropharynx: normal tongue, hard and soft palate; posterior pharynx without erithema, exudate or lesions. neck: thyroid: normal size, consistency and position; no masses or tenderness. respiratory: effort: normal chest excursion; no intercostal retraction or accessory muscle use. cardiovascular: abdominal aorta: normal size and position; no bruits. palpation: PMI of normal size and position; normal rhythm; no thrill or murmurs. abdominal: abdomen: normal consistency; no tenderness or masses. hernias: no hernias appreciated. liver: normal size and consistency. spleen: not palpable. rectal: hemoccult/guaiac: not performed. musculoskeletal: digits and nails: no clubbing, cyanosis, petechiae or other inflammatory conditions.  gait: normal gait and station head and neck: normal range of motion; no pain, crepitation or contracture. spine/ribs/pelvis: normal range of motion; no pain, deformity or contracture. neurologic: cranial nerves: II-XII normal.   psychiatric: judgement/insight: within normal limits. memory: within normal limits for recent and remote events. mood and affect: no evidence of depression, anxiety or agitation. orientation: oriented to time, space and person. Basic Metabolic Profile   No results for input(s): NA, K, CL, CO2, BUN, GLU, CA, MG, PHOS in the last 72 hours. No lab exists for component: CREAT      CBC w/Diff    No results for input(s): WBC, RBC, HGB, HCT, MCV, MCH, MCHC, RDW, PLT, HGBEXT, HCTEXT, PLTEXT in the last 72 hours. No lab exists for component: MPV No results for input(s): GRANS, LYMPH, EOS, PRO, MYELO, METAS, BLAST in the last 72 hours. No lab exists for component: MONO, BASO     Hepatic Function   No results for input(s): ALB, TP, TBILI, AP, AML, LPSE in the last 72 hours. No lab exists for component: DBILI, GPT, SGOT     Coags   No results for input(s): PTP, INR, APTT, INREXT in the last 72 hours. Gene Gutiérrez MD  [facility] [address]  Cell: [phone]  Direct pager: [phone]  [email or web address]  [email or web address]

## 2022-05-10 NOTE — DISCHARGE INSTRUCTIONS
Patient Education        Upper GI Endoscopy: What to Expect at [address] had an upper GI endoscopy. Your doctor used a thin, lighted tube that bends to look at the inside of your esophagus, your stomach, and the first part of the small intestine, called the duodenum. After you have an endoscopy, you will stay at the hospital or clinic for 1 to 2 hours. This will allow the medicine to wear off. You will be able to go home after your doctor or nurse checks to make sure that you're not having any problems. You may have to stay overnight if you had treatment during the test. You may have a sore throat for a day or two after the test.  This care sheet gives you a general idea about what to expect after the test.  How can you care for yourself at home? Activity   · Rest as much as you need to after you go home. · You should be able to go back to your usual activities the day after the test.  Diet   · Follow your doctor's directions for eating after the test.  · Drink plenty of fluids (unless your doctor has told you not to). Medications   · If you have a sore throat the day after the test, use an over-the-counter spray to numb your throat. Follow-up care is a key part of your treatment and safety. Be sure to make and go to all appointments, and call your doctor if you are having problems. It's also a good idea to know your test results and keep a list of the medicines you take. When should you call for help? Call 911 anytime you think you may need emergency care. For example, call if:    · You passed out (lost consciousness).     · You have trouble breathing.     · You pass maroon or bloody stools.    Call your doctor now or seek immediate medical care if:    · You have pain that does not get better after your take pain medicine.     · You have new or worse belly pain.     · You have blood in your stools.     · You are sick to your stomach and cannot keep fluids down.     · You have a fever.     · You cannot pass stools or gas. Watch closely for changes in your health, and be sure to contact your doctor if:    · Your throat still hurts after a day or two.     · You do not get better as expected. Where can you learn more? Go to http://www.hybris.com/  Enter J454 in the search box to learn more about \"Upper GI Endoscopy: What to Expect at Home. \"  Current as of: September 8, 2021               Content Version: 13.2  © 2006-2022 [employer]. Care instructions adapted under license by [employer] (which disclaims liability or warranty for this information). If you have questions about a medical condition or this instruction, always ask your healthcare professional. [address] any warranty or liability for your use of this information. Colonoscopy: What to Expect at [address] [place] [address]  After a colonoscopy, you'll stay at the clinic until you wake up. Then you can go home. But you'll need to arrange for a ride. Your doctor will tell you when you can eat and do your other usual activities. Your doctor will talk to you about when you'll need your next colonoscopy. Your doctor can help you decide how often you need to be checked. This will depend on the results of your test and your risk for colorectal cancer. After the test, you may be bloated or have gas pains. You may need to pass gas. If a biopsy was done or a polyp was removed, you may have streaks of blood in your stool (feces) for a few days. Problems such as heavy rectal bleeding may not occur until several weeks after the test. This isn't common. But it can happen after polyps are removed. This care sheet gives you a general idea about how long it will take for you to recover. But each person recovers at a different pace. Follow the steps below to get better as quickly as possible. How can you care for yourself at home? Activity    · Rest when you feel tired.      · You can do your normal activities when it feels okay to do so. Diet    · Follow your doctor's directions for eating. · Unless your doctor has told you not to, drink plenty of fluids. This helps to replace the fluids that were lost during the colon prep. · Do not drink alcohol. Medicines    · Your doctor will tell you if and when you can restart your medicines. You will also be given instructions about taking any new medicines. · If you take aspirin or some other blood thinner, ask your doctor if and when to start taking it again. Make sure that you understand exactly what your doctor wants you to do. · If polyps were removed or a biopsy was done during the test, your doctor may tell you not to take aspirin or other anti-inflammatory medicines for a few days. These include ibuprofen (Advil, Motrin) and naproxen (Aleve). Other instructions    · For your safety, do not drive or operate machinery until the medicine wears off and you can think clearly. Your doctor may tell you not to drive or operate machinery until the day after your test.     · Do not sign legal documents or make major decisions until the medicine wears off and you can think clearly. The anesthesia can make it hard for you to fully understand what you are agreeing to. Follow-up care is a key part of your treatment and safety. Be sure to make and go to all appointments, and call your doctor if you are having problems. It's also a good idea to know your test results and keep a list of the medicines you take. When should you call for help? Call 911 anytime you think you may need emergency care. For example, call if:    · You passed out (lost consciousness). · You pass maroon or bloody stools. · You have trouble breathing. Call your doctor now or seek immediate medical care if:    · You have pain that does not get better after you take pain medicine. · You are sick to your stomach or cannot drink fluids.      · You have new or worse belly pain. · You have blood in your stools. · You have a fever. · You cannot pass stools or gas. Watch closely for changes in your health, and be sure to contact your doctor if you have any problems. Where can you learn more? Go to http://www.gray.com/  Enter E264 in the search box to learn more about \"Colonoscopy: What to Expect at Home. \"  Current as of: September 8, 2021               Content Version: 13.2  © 2006-2022 [employer]. Care instructions adapted under license by [employer] (which disclaims liability or warranty for this information). If you have questions about a medical condition or this instruction, always ask your healthcare professional. [place] [address] any warranty or liability for your use of this information. DISCHARGE SUMMARY from Nurse     POST-PROCEDURE INSTRUCTIONS:    Call your Physician if you:  ? Observe any excess bleeding. ? Develop a temperature over 100.5o F.  ? Experience abdominal, shoulder or chest pain. ? Notice any signs of decreased circulation or nerve impairment to an extremity such as a change in color, persistent numbness, tingling, coldness or increase in pain. ? Vomit blood or you have nausea and vomiting lasting longer than 4 hours. ? Are unable to take medications. ? Are unable to urinate within 8 hours after discharge following general anesthesia or intravenous sedation. For the next 24 hours after receiving general anesthesia or intravenous sedation, or while taking prescription Narcotics, limit your activities:  ? Do NOT drive a motor vehicle, operate hazard machinery or power tools, or perform tasks that require coordination. The medication you received during your procedure may have some effect on your mental awareness. ? Do NOT make important personal or business decisions.   The medication you received during your procedure may have some effect on your mental awareness. ? Do NOT drink alcoholic beverages. These drinks do not mix well with the medications that have been given to you. ? Upon discharge from the hospital, you must be accompanied by a responsible adult. ? Resume your diet as directed by your physician. ? Resume medications as your physician has prescribed. ? Please give a list of your current medications to your Primary Care Provider. ? Please update this list whenever your medications are discontinued, doses are changed, or new medications (including over-the-counter products) are added. ? Please carry medication information at all times in case of emergency situations. These are general instructions for a healthy lifestyle:    No smoking/ No tobacco products/ Avoid exposure to second hand smoke.  Surgeon General's Warning:  Quitting smoking now greatly reduces serious risk to your health. Obesity, smoking, and a sedentary lifestyle greatly increase your risk for illness.  A healthy diet, regular physical exercise & weight monitoring are important for maintaining a healthy lifestyle   You may be retaining fluid if you have a history of heart failure or if you experience any of the following symptoms:  Weight gain of 3 pounds or more overnight or 5 pounds in a week, increased swelling in our hands or feet or shortness of breath while lying flat in bed. Please call your doctor as soon as you notice any of these symptoms; do not wait until your next office visit. Recognize signs and symptoms of STROKE:  F  -  Face looks uneven  A  -  Arms unable to move or move unevenly  S  -  Speech slurred or non-existent  T  -  Time to call 911 - as soon as signs and symptoms begin - DO NOT go back to bed or wait to see If you get better - TIME IS BRAIN. Colorectal Screening   Colorectal cancer almost always develops from precancerous polyps (abnormal growths) in the colon or rectum.   Screening tests can find precancerous polyps, so that they can be removed before they turn into cancer. Screening tests can also find colorectal cancer early, when treatment works best.  [facility] Speak with your physician about when you should begin screening and how often you should be tested. [employer] Activation    Thank you for requesting access to [facility]. Please follow the instructions below to securely access and download your online medical record. [facility] allows you to send messages to your doctor, view your test results, renew your prescriptions, schedule appointments, and more. How Do I Sign Up? 1. In your internet browser, go to [email or web address]. 2. Click on the First Time User? Click Here link in the Sign In box. You will see the New Member Sign Up page. 3. Enter your [facility] Access Code exactly as it appears below. You will not need to use this code after youve completed the sign-up process. If you do not sign up before the expiration date, you must request a new code. [facility] Access Code: Activation code not generated  Current [facility] Status: Active (This is the date your [facility] access code will )    4. Enter the last four digits of your Social Security Number (xxxx) and Date of Birth (mm/dd/yyyy) as indicated and click Submit. You will be taken to the next sign-up page. 5. Create a [facility] ID. This will be your [facility] login ID and cannot be changed, so think of one that is secure and easy to remember. 6. Create a [facility] password. You can change your password at any time. 7. Enter your Password Reset Question and Answer. This can be used at a later time if you forget your password. 8. Enter your e-mail address. You will receive e-mail notification when new information is available in [address] 9. Click Sign Up. You can now view and download portions of your medical record. 10. Click the Download Summary menu link to download a portable copy of your medical information.     Additional Information    If you have questions, please call [phone]. Remember, MyChart is NOT to be used for urgent needs. For medical emergencies, dial 911. Educational references and/or instructions provided during this visit included:    See Attached      APPOINTMENTS:    Per MD Instruction    Discharge information has been reviewed with the patient. The patient verbalized understanding. Patient Education        Esophageal Dilation: What to Expect at [address] had an esophageal dilation. This procedure can open up narrow areas of the esophagus. After the procedure, you will stay at the hospital or surgery center for 1 to 2 hours. This will allow the medicine to wear off. You will be able to go home after your doctor or nurse checks to make sure that you're not having any problems. This care sheet gives you a general idea about how long it will take for you to recover. But each person recovers at a different pace. Follow the steps below to get better as quickly as possible. How can you care for yourself at home? Activity    · Rest as much as you need to after you go home.     · You should be able to go back to your usual activities the day after the procedure. Diet    · Follow your doctor's directions for eating after the procedure.     · Drink plenty of fluids (unless your doctor has told you not to). Medicines    · Your doctor will tell you if and when you can restart your medicines. He or she will also give you instructions about taking any new medicines.     · If you take aspirin or some other blood thinner, ask your doctor if and when to start taking it again. Make sure that you understand exactly what your doctor wants you to do.     · If you have a sore throat the day after the procedure, use an over-the-counter spray to numb your throat. Sucking on throat lozenges and gargling with warm salt water may also help relieve your symptoms.    Follow-up care is a key part of your treatment and safety. Be sure to make and go to all appointments, and call your doctor if you are having problems. It's also a good idea to know your test results and keep a list of the medicines you take. When should you call for help? Call 911 anytime you think you may need emergency care. For example, call if:    · You passed out (lost consciousness).     · You have trouble breathing.     · Your stools are maroon or very bloody   Call your doctor now or seek immediate medical care if:    · You have new or worse belly pain.     · You have a fever.     · You have new or more blood in your stools.     · You are sick to your stomach and cannot drink fluids.     · You cannot pass stools or gas.     · You have pain that does not get better after you take pain medicine. Watch closely for changes in your health, and be sure to contact your doctor if:    · Your throat still hurts after a day or two.     · You do not get better as expected. Where can you learn more? Go to http://www.gray.com/  Enter J014 in the search box to learn more about \"Esophageal Dilation: What to Expect at Home. \"  Current as of: September 8, 2021               Content Version: 13.2  © 2006-2022 [employer]. Care instructions adapted under license by [employer] (which disclaims liability or warranty for this information). If you have questions about a medical condition or this instruction, always ask your healthcare professional. [place] [address] any warranty or liability for your use of this information.

## 2022-05-10 NOTE — ANESTHESIA POSTPROCEDURE EVALUATION
Procedure(s):  UPPER ENDOSCOPY with dilation  SIGMOIDOSCOPY FLEXIBLE.     MAC    Anesthesia Post Evaluation      Multimodal analgesia: multimodal analgesia used between 6 hours prior to anesthesia start to PACU discharge  Patient location during evaluation: bedside  Patient participation: complete - patient participated  Level of consciousness: awake  Pain management: adequate  Airway patency: patent  Anesthetic complications: no  Cardiovascular status: stable  Respiratory status: acceptable  Hydration status: acceptable  Post anesthesia nausea and vomiting:  controlled  Final Post Anesthesia Temperature Assessment:  Normothermia (36.0-37.5 degrees C)      INITIAL Post-op Vital signs:   Vitals Value Taken Time   /71 05/10/22 1451   Temp     Pulse 83 05/10/22 1451   Resp 20 05/10/22 1451   SpO2 93 % 05/10/22 1451

## 2022-05-10 NOTE — ANESTHESIA PREPROCEDURE EVALUATION
Anesthetic History     PONV          Review of Systems / Medical History  Patient summary reviewed and pertinent labs reviewed    Pulmonary  Within defined limits                 Neuro/Psych   Within defined limits           Cardiovascular    Hypertension: well controlled        Dysrhythmias : atrial fibrillation      Exercise tolerance: >4 METS     GI/Hepatic/Renal     GERD: poorly controlled           Endo/Other    Diabetes    Arthritis     Other Findings   Comments: Documentation of current medication  Current medications obtained, documented and obtained? YES      Risk Factors for Postoperative nausea/vomiting:       History of postoperative nausea/vomiting? NO       Female? YES       Motion sickness? NO       Intended opioid administration for postoperative analgesia? NO      Smoking Abstinence:  Current Smoker? NO  Elective Surgery? YES  Seen preoperatively by anesthesiologist or proxy prior to day of surgery? YES  Pt abstained from smoking 24 hours prior to anesthesia?  N/A    Preventive care/screening for High Blood Pressure:  Aged 18 years and older: YES  Screened for high blood pressure: YES  Patients with high blood pressure referred to primary care provider   for BP management: YES                 Physical Exam    Airway  Mallampati: II  TM Distance: 4 - 6 cm  Neck ROM: normal range of motion   Mouth opening: Normal     Cardiovascular  Regular rate and rhythm,  S1 and S2 normal,  no murmur, click, rub, or gallop  Rhythm: regular  Rate: normal         Dental  No notable dental hx       Pulmonary  Breath sounds clear to auscultation               Abdominal  GI exam deferred       Other Findings            Anesthetic Plan    ASA: 3  Anesthesia type: MAC          Induction: Intravenous  Anesthetic plan and risks discussed with: Patient

## 2022-08-23 ENCOUNTER — DOCUMENTATION ONLY (OUTPATIENT)
Dept: ONCOLOGY | Age: 82
End: 2022-08-23

## 2022-08-23 LAB
A-G RATIO,AGRAT: 0.9 RATIO (ref 1.1–2.6)
ABSOLUTE LYMPHOCYTE COUNT, 10803: 1.7 K/UL (ref 1–4.8)
ALBUMIN SERPL-MCNC: 3.3 G/DL (ref 3.5–5)
ALP SERPL-CCNC: 200 U/L (ref 40–120)
ALT SERPL-CCNC: 13 U/L (ref 5–40)
ANION GAP SERPL CALC-SCNC: 13 MMOL/L (ref 3–15)
AST SERPL W P-5'-P-CCNC: 18 U/L (ref 10–37)
BASOPHILS # BLD: 0.1 K/UL (ref 0–0.2)
BASOPHILS NFR BLD: 1 % (ref 0–2)
BILIRUB SERPL-MCNC: 0.5 MG/DL (ref 0.2–1.2)
BUN SERPL-MCNC: 18 MG/DL (ref 6–22)
CALCIUM SERPL-MCNC: 9.3 MG/DL (ref 8.4–10.5)
CHLORIDE SERPL-SCNC: 95 MMOL/L (ref 98–110)
CO2 SERPL-SCNC: 34 MMOL/L (ref 20–32)
CREAT SERPL-MCNC: 1.3 MG/DL (ref 0.8–1.4)
EOSINOPHIL # BLD: 0 K/UL (ref 0–0.5)
EOSINOPHIL NFR BLD: 0 % (ref 0–6)
ERYTHROCYTE [DISTWIDTH] IN BLOOD BY AUTOMATED COUNT: 15.8 % (ref 10–15.5)
GLOBULIN,GLOB: 3.8 G/DL (ref 2–4)
GLOMERULAR FILTRATION RATE: 39.3 ML/MIN/1.73 SQ.M.
GLUCOSE SERPL-MCNC: 135 MG/DL (ref 70–99)
GRANULOCYTES,GRANS: 82 % (ref 40–75)
HCT VFR BLD AUTO: 39.7 % (ref 35.1–48.3)
HGB BLD-MCNC: 12.7 G/DL (ref 11.7–16.1)
LYMPHOCYTES, LYMLT: 11 % (ref 20–45)
MCH RBC QN AUTO: 30 PG (ref 26–34)
MCHC RBC AUTO-ENTMCNC: 32 G/DL (ref 31–36)
MCV RBC AUTO: 95 FL (ref 80–99)
MONOCYTES # BLD: 0.8 K/UL (ref 0.1–1)
MONOCYTES NFR BLD: 6 % (ref 3–12)
NEUTROPHILS # BLD AUTO: 12.1 K/UL (ref 1.8–7.7)
PLATELET # BLD AUTO: 312 K/UL (ref 140–440)
PMV BLD AUTO: 10.1 FL (ref 9–13)
POTASSIUM SERPL-SCNC: 2.9 MMOL/L (ref 3.5–5.5)
PROT SERPL-MCNC: 7.1 G/DL (ref 6.2–8.1)
RBC # BLD AUTO: 4.18 M/UL (ref 3.8–5.2)
SODIUM SERPL-SCNC: 142 MMOL/L (ref 133–145)
WBC # BLD AUTO: 14.8 K/UL (ref 4–11)

## 2022-08-23 NOTE — PROGRESS NOTES
Lab reported patient Potassium was low at 2.9. Spoke to patient and she reported that she has been taking Lasix recently and also taking 10 mEq of Potassium 3 times a week. I have advise her to increase her dose to daily and follow up with PCP.

## 2022-08-24 ENCOUNTER — TELEPHONE (OUTPATIENT)
Dept: ONCOLOGY | Age: 82
End: 2022-08-24

## 2022-08-24 DIAGNOSIS — D50.8 IRON DEFICIENCY ANEMIA SECONDARY TO INADEQUATE DIETARY IRON INTAKE: Primary | ICD-10-CM

## 2022-08-24 LAB
25(OH)D3 SERPL-MCNC: 14.1 NG/ML (ref 32–100)
FE % SATURATION,PSAT: 24 % (ref 20–50)
FERRITIN SERPL-MCNC: 524 NG/ML (ref 10–291)
IRON,IRN: 52 MCG/DL (ref 30–160)
TIBC,TIBC: 219 MCG/DL (ref 228–428)
UIBC SERPL-MCNC: 167 MCG/DL (ref 110–370)
VIT B12 SERPL-MCNC: 393 PG/ML (ref 211–911)

## 2022-08-24 RX ORDER — ERGOCALCIFEROL 1.25 MG/1
50000 CAPSULE ORAL
Qty: 12 CAPSULE | Refills: 0 | Status: SHIPPED | OUTPATIENT
Start: 2022-08-24

## 2022-08-25 LAB — FOLATE,FOL: 15.2 NG/ML

## 2022-08-29 ENCOUNTER — VIRTUAL VISIT (OUTPATIENT)
Dept: ONCOLOGY | Age: 82
End: 2022-08-29
Payer: MEDICARE

## 2022-08-29 DIAGNOSIS — D63.1 ANEMIA DUE TO STAGE 3 CHRONIC KIDNEY DISEASE, UNSPECIFIED WHETHER STAGE 3A OR 3B CKD (HCC): ICD-10-CM

## 2022-08-29 DIAGNOSIS — D50.8 IRON DEFICIENCY ANEMIA SECONDARY TO INADEQUATE DIETARY IRON INTAKE: Primary | ICD-10-CM

## 2022-08-29 DIAGNOSIS — N18.30 ANEMIA DUE TO STAGE 3 CHRONIC KIDNEY DISEASE, UNSPECIFIED WHETHER STAGE 3A OR 3B CKD (HCC): ICD-10-CM

## 2022-08-29 DIAGNOSIS — E87.6 HYPOKALEMIA: ICD-10-CM

## 2022-08-29 PROCEDURE — 99443 PR PHYS/QHP TELEPHONE EVALUATION 21-30 MIN: CPT | Performed by: INTERNAL MEDICINE

## 2022-08-29 RX ORDER — ALLOPURINOL 100 MG/1
TABLET ORAL
COMMUNITY
Start: 2022-06-15

## 2022-08-29 NOTE — PROGRESS NOTES
Khadar Galicia is a 78 y.o. female, evaluated via audio-only technology on 8/29/2022 for Follow-up  . Assessment & Plan:   Diagnoses and all orders for this visit:    1. Iron deficiency anemia secondary to inadequate dietary iron intake    2. Anemia due to stage 3 chronic kidney disease, unspecified whether stage 3a or 3b CKD ([facility] [address]    3. Hypokalemia        Iron deficiency Anemia  Anemia of chronic disease  -- Completed 2 units of PRBC on  9/10/2020  -- Completed 4 doses of Venofer 10/29/2020   -- 5/27/2021 CBC reported hemoglobin 12.0,  hematocrit 37.6%, total WBC 12.2, and platelet 914,577. Iron profile showed saturation 17% and ferritin 199.  -- 9/7/2021 CBC reported hemoglobin 12.5, hematocrit 29%, WBC 10.5, platelet 886,330. Iron saturation 20%, ferritin 138.  -- 4/13/2022 CBC reported hemoglobin 11.4, hematocrit 36.9%, WBC 10.3, platelet 897,214. Iron saturation 13%, ferritin 53. Creatinine 1.5, eGFR 32.4  -- S.p Venofer x 3  -- She reported chronic fatigue. Today I have reviewed with the patient about recent lab reports. 8/23/2022 WBC 14, H/H 12.7/39, PLTs 312,000       Plan  -- Iron oral replacement daily. -- Continue lab check CBC, Iron profiles, ferritin  -- She was advised to f/u PCP/GI for her hemorrhoids management. Hypokalemia  -- 8/23/2022 K 2.9  -- She reported that she has been taking Lasix recently prescribed by PCP and was told to take 10 mEq of Potassium 3 times a week. -- Advised her to increase her dose to daily and contact PCP today for further plan. She verbally understands and agrees with the plan. -- Advised the patient to present to ED if new or worsening symptoms. Thrombocytosis, resolved:   -- Likely reactive 2/2 COLIN  --- 04/13/2022 CBC reported hemoglobin 11.4, hematocrit 36.4%, WBC 10.3, platelet 587,241.           -- We will see the patient back in clinic in about 6 months. Always sooner if required.   The patient can have lab done prior to our next clinic visit. Subjective:   Mrs. Tonny Gonzales is a 10-year-old woman who is  being seen for severe anemia. She has previously completed iron therapy with Venofer at a dose of 250 mg every 2 weeks for 4 doses on 10/29/2020. Completed 2 units of PRBC of 09/10/2020. Today she reported chronic fatigue. She denied any bleeding, fever, chills, night sweat, unintentional weight loss, skin lumps or bumps, acute bleeding or bruising issues. Denied headache, acute vision change, dizziness, chest pain, worsen shortness of breath, palpitation, productive cough, nausea, vomiting, abdominal pain, altered bowel habits, dysuria, new bone pain or back pain, focal numbness or weakness    Prior to Admission medications    Medication Sig Start Date End Date Taking? Authorizing Provider   allopurinoL (ZYLOPRIM) 100 mg tablet  6/15/22   Provider, Historical   ergocalciferol (ERGOCALCIFEROL) 1,250 mcg (50,000 unit) capsule Take 1 Capsule by mouth every seven (7) days. 8/24/22   Jenniffer Rubinstein, DNP   acetaminophen (Tylenol Extra Strength) 500 mg tablet Take 500 mg by mouth every six (6) hours as needed for Pain. Provider, Historical   albuterol (PROVENTIL HFA, VENTOLIN HFA, PROAIR HFA) 90 mcg/actuation inhaler Take 2 Puffs by inhalation. 2/10/22   Provider, Historical   methotrexate (RHEUMATREX) 2.5 mg tablet TAKE 4 TABLET BY ORAL ROUTE EVERY WEEK (ALL THE SAME DAY)  Patient not taking: Reported on 5/6/2022 1/14/22   Provider, Historical   predniSONE (DELTASONE) 5 mg tablet Take 5 mg by mouth daily. Patient not taking: Reported on 5/6/2022 1/19/22   Provider, Historical   furosemide (LASIX) 20 mg tablet  6/28/21   Provider, Historical   estradioL (ESTRACE) 0.01 % (0.1 mg/gram) vaginal cream Apply 4 g to affected area every Monday, Wednesday, Friday. Apply pea sized amount to urethra 3x a week 7/7/21   REID Rdz   loratadine (CLARITIN PO) Take  by mouth.     Provider, Historical   ELIQUIS 5 mg tablet  9/10/18   Provider, Historical dilTIAZem [facility]ONT[facility]) 360 mg ER capsule  8/5/18   Provider, Historical   mupirocin (BACTROBAN) 2 % ointment  10/4/18   Provider, Historical   hydroxychloroquine (PLAQUENIL) 200 mg tablet  10/3/18   Provider, Historical   lisinopril (PRINIVIL, ZESTRIL) 5 mg tablet Take  by mouth daily. Patient not taking: Reported on 5/6/2022    Provider, Historical   potassium chloride (K-DUR, KLOR-CON) 10 mEq tablet Take 1 Tab by mouth daily. 4/13/15   Elodia Henderson NP   naproxen (NAPROSYN) 500 mg tablet Take 500 mg by mouth daily. Patient not taking: Reported on 5/6/2022    Provider, Historical   triamcinolone acetonide (KENALOG) 0.1 % ointment Apply  to affected area two (2) times a day. use thin layer    Provider, Historical   docusate sodium (COLACE) 100 mg capsule Take 100 mg by mouth daily. Provider, Historical   traMADol (ULTRAM) 50 mg tablet Take 50 mg by mouth every six (6) hours as needed for Pain. Provider, Historical   aspirin 81 mg tablet 81 Tabs. Take 81 mg by Mouth Once a Day. Patient not taking: Reported on 5/6/2022    Provider, Historical   gabapentin (NEURONTIN) 300 mg capsule 300 mg. Take 1 Cap by Mouth 3 Times Daily. 11/6/13   Provider, Historical   esomeprazole (NEXIUM) 40 mg capsule Take 1 Cap by Mouth Once a Day. 2/7/14   Provider, Historical   pramipexole (MIRAPEX) 0.125 mg tablet Take 1 Tab by Mouth 3 Times Daily.  3/10/14   Provider, Historical     Patient Active Problem List   Diagnosis Code    Nonspecific abnormal electrocardiogram (ECG) (EKG) R94.31    Essential hypertension, benign I10    Pre-operative cardiovascular examination Z01.810    Undiagnosed cardiac murmurs R01.1    Mitral valve disorders(424.0) I05.9    Edema R60.9    Nausea and vomiting in adult R11.2    Localized osteoarthrosis not specified whether primary or secondary, lower leg M17.10    Hyperkalemia E87.5    GERD (gastroesophageal reflux disease) K21.9    DJD (degenerative joint disease) M19.90    Disorder of bone and cartilage M89.9, S58.6    Diastolic dysfunction with chronic heart failure ([facility]) I50.32    Community acquired pneumonia of left lower lobe of lung J18.9    Chest tightness R07.89    Chest pain R07.9    CAP (community acquired pneumonia) J18.9    A-fib ([facility]) I48.91    Abdominal pain R10.9    Paroxysmal atrial fibrillation ([facility]) I48.0    Postoperative anemia due to acute blood loss D62    RLS (restless legs syndrome) G25.81    Sepsis ([facility]) A41.9    Malignant neoplasm of skin C44.90    Iron deficiency anemia secondary to inadequate dietary iron intake D50.8    Thrombocytosis B17.704    Neutrophilic leukocytosis I91.8    Acute pain of left shoulder M25.512    MONCHO (acute kidney injury) ([facility] [address] N17.9    Arthritis of knee, left M17.12    Dehydration E86.0    GI bleed K92.2    Hyperglycemia R73.9    Hypomagnesemia E83.42    Moderate single current episode of major depressive disorder ([facility]) F32.1    S/P total knee arthroplasty, left A85.344    UTI due to extended-spectrum beta lactamase (ESBL) producing Escherichia coli N39.0, B96.29, Z16.12    UTI (urinary tract infection) N39.0    CKD (chronic kidney disease) stage 3, GFR 30-59 ml/min ([facility]) N18.30    Debility R53.81    Frailty R54    Rheumatoid arthritis ([facility]) M06.9    Type 2 diabetes mellitus with microalbuminuria, without long-term current use of insulin ([facility]) E11.29, R80.9       Review of Systems   Constitutional:  Negative for chills, diaphoresis, fever, malaise/fatigue and weight loss. Respiratory:  Negative for cough, hemoptysis, shortness of breath and wheezing. Cardiovascular:  Negative for chest pain, palpitations and leg swelling. Gastrointestinal:  Negative for abdominal pain, diarrhea, heartburn, nausea and vomiting. Genitourinary:  Negative for dysuria, frequency, hematuria and urgency. Musculoskeletal:  Negative for joint pain and myalgias. Skin:  Negative for itching and rash.    Neurological:  Negative for dizziness, seizures, weakness and headaches. Psychiatric/Behavioral:  Negative for depression. The patient does not have insomnia. Patient-Reported Vitals 8/29/2022   Patient-Reported Weight 184   Patient-Reported Height -   Patient-Reported Pulse -   Patient-Reported Temperature -   Patient-Reported Systolic  -   Patient-Reported Diastolic -         The patient was advised that our priority at this time is to keep the patients safe, and therefore we are reaching out to patients virtually to prevent them coming into the office unless necessarily. Patient verbalized understanding and was agreeable for virtual visit. Dyana Adolfo, who was evaluated through a patient-initiated, synchronous (real-time) audio only encounter, and/or her healthcare decision maker, is aware that it is a billable service, with coverage as determined by her insurance carrier. She provided verbal consent to proceed: Yes. She has not had a related appointment within my department in the past 7 days or scheduled within the next 24 hours. I have spent 21 minutes reviewing previous notes, test results and discussing the diagnosis and importance of compliance with the treatment plan as well as documenting on the day of the visit.     Yudith Mendes MD

## 2023-02-21 LAB
A/G RATIO: 1 RATIO (ref 1.1–2.6)
ALBUMIN SERPL-MCNC: 3.4 G/DL (ref 3.5–5)
ALP BLD-CCNC: 203 U/L (ref 40–120)
ALT SERPL-CCNC: 9 U/L (ref 5–40)
ANION GAP SERPL CALCULATED.3IONS-SCNC: 10 MMOL/L (ref 3–15)
AST SERPL-CCNC: 17 U/L (ref 10–37)
BASOPHILS # BLD: 1 % (ref 0–2)
BASOPHILS ABSOLUTE: 0.1 K/UL (ref 0–0.2)
BILIRUB SERPL-MCNC: 0.4 MG/DL (ref 0.2–1.2)
BUN BLDV-MCNC: 17 MG/DL (ref 6–22)
CALCIUM SERPL-MCNC: 9.2 MG/DL (ref 8.4–10.5)
CHLORIDE BLD-SCNC: 96 MMOL/L (ref 98–110)
CO2: 37 MMOL/L (ref 20–32)
CREAT SERPL-MCNC: 1.5 MG/DL (ref 0.8–1.4)
EOSINOPHIL # BLD: 3 % (ref 0–6)
EOSINOPHILS ABSOLUTE: 0.3 K/UL (ref 0–0.5)
FERRITIN: 117 NG/ML (ref 10–291)
GLOBULIN: 3.5 G/DL (ref 2–4)
GLOMERULAR FILTRATION RATE: 33.3 ML/MIN/1.73 SQ.M.
GLUCOSE: 114 MG/DL (ref 70–99)
HCT VFR BLD CALC: 35.5 % (ref 35.1–48.3)
HEMOGLOBIN: 11.2 G/DL (ref 11.7–16.1)
IRON % SATURATION: 14 % (ref 20–50)
IRON: 32 MCG/DL (ref 30–160)
LYMPHOCYTES # BLD: 13 % (ref 20–45)
LYMPHOCYTES ABSOLUTE: 1.5 K/UL (ref 1–4.8)
MCH RBC QN AUTO: 30 PG (ref 26–34)
MCHC RBC AUTO-ENTMCNC: 32 G/DL (ref 31–36)
MCV RBC AUTO: 94 FL (ref 80–99)
MONOCYTES ABSOLUTE: 0.8 K/UL (ref 0.1–1)
MONOCYTES: 7 % (ref 3–12)
NEUTROPHILS ABSOLUTE: 9.4 K/UL (ref 1.8–7.7)
NEUTROPHILS: 78 % (ref 40–75)
PDW BLD-RTO: 15.4 % (ref 10–15.5)
PLATELET # BLD: 277 K/UL (ref 140–440)
PMV BLD AUTO: 10.1 FL (ref 9–13)
POTASSIUM SERPL-SCNC: 3.8 MMOL/L (ref 3.5–5.5)
RBC: 3.78 M/UL (ref 3.8–5.2)
SODIUM BLD-SCNC: 143 MMOL/L (ref 133–145)
TOTAL IRON BINDING CAPACITY: 227 MCG/DL (ref 228–428)
TOTAL PROTEIN: 6.9 G/DL (ref 6.2–8.1)
UIBC: 195 MCG/DL (ref 110–370)
WBC: 12.2 K/UL (ref 4–11)

## 2023-03-01 ENCOUNTER — OFFICE VISIT (OUTPATIENT)
Age: 83
End: 2023-03-01
Payer: MEDICARE

## 2023-03-01 VITALS
HEIGHT: 64 IN | SYSTOLIC BLOOD PRESSURE: 129 MMHG | BODY MASS INDEX: 31.24 KG/M2 | HEART RATE: 84 BPM | OXYGEN SATURATION: 88 % | DIASTOLIC BLOOD PRESSURE: 81 MMHG | TEMPERATURE: 95.7 F | WEIGHT: 183 LBS

## 2023-03-01 DIAGNOSIS — N18.30 STAGE 3 CHRONIC KIDNEY DISEASE, UNSPECIFIED WHETHER STAGE 3A OR 3B CKD (HCC): ICD-10-CM

## 2023-03-01 DIAGNOSIS — D75.839 THROMBOCYTOSIS: ICD-10-CM

## 2023-03-01 DIAGNOSIS — D50.8 IRON DEFICIENCY ANEMIA SECONDARY TO INADEQUATE DIETARY IRON INTAKE: Primary | ICD-10-CM

## 2023-03-01 PROCEDURE — 3074F SYST BP LT 130 MM HG: CPT | Performed by: NURSE PRACTITIONER

## 2023-03-01 PROCEDURE — 3079F DIAST BP 80-89 MM HG: CPT | Performed by: NURSE PRACTITIONER

## 2023-03-01 PROCEDURE — 99442 PR PHYS/QHP TELEPHONE EVALUATION 11-20 MIN: CPT | Performed by: NURSE PRACTITIONER

## 2023-03-01 PROCEDURE — 99213 OFFICE O/P EST LOW 20 MIN: CPT | Performed by: NURSE PRACTITIONER

## 2023-03-01 ASSESSMENT — ENCOUNTER SYMPTOMS
EYES NEGATIVE: 1
SHORTNESS OF BREATH: 1

## 2023-03-01 NOTE — PROGRESS NOTES
Hematology/Oncology  Progress Note    Name: Elke Rees  Date: 3/1/2023  : 1940    Robb Poag, APRN - NP     Ms. Marianela Hill is a 78y.o. year old female who was seen for severe iron deficiency anemia and anemia of chronic kidney disease . Current Therapy: Venofor as needed (completed on 10/29/2020)    Subjective:   Mrs. Marianela Hill is a 23-year-old woman who is  being seen for severe anemia. She has previously completed iron therapy with Venofer at a dose of 250 mg every 2 weeks for 4 doses on 10/29/2020. Completed 2 units of PRBC of 09/10/2020. Today she reported chronic fatigue. She denied any bleeding, fever, chills, night sweat, unintentional weight loss, skin lumps or bumps, acute bleeding or bruising issues. Denied headache, acute vision change, dizziness, chest pain, worsen shortness of breath, palpitation, productive cough, nausea, vomiting, abdominal pain, altered bowel habits, dysuria, new bone pain or back pain, focal numbness or weakness      Past medical history, family history, and social history: these were reviewed and remains unchanged.     Past Medical History:   Diagnosis Date    Arrhythmia     Paroxysmal atrial fibrillation     Chronic kidney disease     Stage 3b chronic kidney disease     Edema 10/9/2014    sec to dependency, chf, mr, knee surgeries & norvasc reduce salt & fluid as no other Sx now     Essential hypertension     GERD (gastroesophageal reflux disease)     Malignant neoplasm of skin 2010    Mitral valve disorder     Murmur, cardiac     Other iron deficiency anemias     Restless legs syndrome     RHA (rheumatoid arthritis) (HCC)     SOB (shortness of breath) on exertion     Type 2 diabetes mellitus with microalbuminuria, without long-term current use of insulin ([address] 2018    no medication     Past Surgical History:   Procedure Laterality Date    CATARACT REMOVAL Bilateral     FLEXIBLE SIGMOIDOSCOPY N/A 5/10/2022    SIGMOIDOSCOPY FLEXIBLE performed by Oneyda Salcido Jean Fuentes MD at [address] [facility] ENDOSCOPY    HEENT      Skin grafting to nose    HYSTERECTOMY (CERVIX STATUS UNKNOWN)      ORTHOPEDIC SURGERY Bilateral     bilateral feet surgery    TOTAL KNEE ARTHROPLASTY Bilateral      Social History     Socioeconomic History    Marital status:       Spouse name: Not on file    Number of children: Not on file    Years of education: Not on file    Highest education level: Not on file   Occupational History    Not on file   Tobacco Use    Smoking status: Never    Smokeless tobacco: Never   Substance and Sexual Activity    Alcohol use: No    Drug use: No    Sexual activity: Not on file   Other Topics Concern    Not on file   Social History Narrative    Not on file     Social Determinants of Health     Financial Resource Strain: Not on file   Food Insecurity: Not on file   Transportation Needs: Not on file   Physical Activity: Not on file   Stress: Not on file   Social Connections: Not on file   Intimate Partner Violence: Not on file   Housing Stability: Not on file     Family History   Problem Relation Age of Onset    Stroke Neg Hx     Heart Surgery Neg Hx     Heart Attack Neg Hx      Current Outpatient Medications   Medication Sig Dispense Refill    Aspirin 81 MG CAPS 81 tablets      bumetanide (BUMEX) 1 MG tablet Take 1 mg by mouth daily      loratadine (CLARITIN) 10 MG capsule Take 10 mg by mouth daily      rosuvastatin (CRESTOR) 10 MG tablet Take 10 mg by mouth daily      doxycycline hyclate (VIBRAMYCIN) 100 MG capsule Take 1 capsule by mouth 2 times daily for 10 days 20 capsule 0    acetaminophen (TYLENOL) 500 MG tablet Take 500 mg by mouth every 6 hours as needed      albuterol sulfate HFA (PROVENTIL;VENTOLIN;PROAIR) 108 (90 Base) MCG/ACT inhaler Inhale 2 puffs into the lungs      allopurinol (ZYLOPRIM) 100 MG tablet ceived the following from [facility]: Outside name: allopurinoL (ZYLOPRIM) 100 mg tablet      apixaban (ELIQUIS) 5 MG TABS tablet ceived the following from [place] [id number] [facility]: Outside name: ELIQUIS 5 mg tablet      dilTIAZem (TIAZAC) 360 MG extended release capsule ceived the following from [facility]: Outside name: dilTIAZem (TIAZAC) 360 mg ER capsule      docusate (COLACE, DULCOLAX) 100 MG CAPS Take 100 mg by mouth daily (Patient not taking: Reported on 3/1/2023)      ergocalciferol (ERGOCALCIFEROL) 1.25 MG (50306 UT) capsule Take 50,000 Units by mouth every 7 days      esomeprazole (NEXIUM) 40 MG delayed release capsule Take 1 Cap by Mouth Once a Day. estradiol (ESTRACE) 0.1 MG/GM vaginal cream Apply 4 g topically      furosemide (LASIX) 20 MG tablet ceived the following from [facility]: Outside name: furosemide (LASIX) 20 mg tablet (Patient not taking: Reported on 3/1/2023)      gabapentin (NEURONTIN) 300 MG capsule 300 mg.      hydroxychloroquine (PLAQUENIL) 200 MG tablet ceived the following from [facility]: Outside name: hydroxychloroquine (PLAQUENIL) 200 mg tablet      lisinopril (PRINIVIL;ZESTRIL) 5 MG tablet Take by mouth daily (Patient not taking: Reported on 3/1/2023)      methotrexate (RHEUMATREX) 2.5 MG chemo tablet TAKE 4 TABLET BY ORAL ROUTE EVERY WEEK (ALL THE SAME DAY) (Patient not taking: Reported on 3/1/2023)      mupirocin (BACTROBAN) 2 % ointment [facility] [address] [facility] [address]: Outside name: mupirocin (BACTROBAN) 2 % ointment (Patient not taking: Reported on 3/1/2023)      naproxen (NAPROSYN) 500 MG tablet Take 500 mg by mouth daily (Patient not taking: Reported on 3/1/2023)      potassium chloride (KLOR-CON M) 10 MEQ extended release tablet Take 10 mEq by mouth daily      pramipexole (MIRAPEX) 0.125 MG tablet Take 1 Tab by Mouth 3 Times Daily.  (Patient not taking: Reported on 3/1/2023)      predniSONE (DELTASONE) 5 MG tablet Take 5 mg by mouth daily (Patient not taking: Reported on 3/1/2023)      traMADol (ULTRAM) 50 MG tablet Take 50 mg by mouth every 6 hours as needed. triamcinolone (KENALOG) 0.1 % ointment Apply topically 2 times daily (Patient not taking: Reported on 3/1/2023)       No current facility-administered medications for this visit. Review of Systems   Constitutional: Negative. HENT: Negative. Eyes: Negative. Respiratory:  Positive for shortness of breath. Endocrine: Negative. Genitourinary: Negative. Musculoskeletal: Negative. Neurological: Negative. Hematological: Negative. Psychiatric/Behavioral: Negative. Objective:   /81   Pulse 84   Temp (!) 95.7 °F (35.4 °C)   Ht 5' 4\" (1.626 m)   Wt 183 lb (83 kg)   LMP  (LMP Unknown)   SpO2 (!) 88%   BMI 31.41 kg/m²   ECOG PS1  Physical Exam  Cardiovascular:      Rate and Rhythm: Normal rate. Pulmonary:      Breath sounds: Normal breath sounds. Musculoskeletal:         General: Normal range of motion. Cervical back: Normal range of motion. Skin:     General: Skin is warm. Neurological:      Mental Status: She is oriented to person, place, and time. Psychiatric:         Mood and Affect: Mood normal.         Behavior: Behavior normal.         Thought Content:  Thought content normal.         Judgment: Judgment normal.        Lab data:      Lab Results   Component Value Date    WBC 12.2 (H) 02/21/2023    HGB 11.2 (L) 02/21/2023    HCT 35.5 02/21/2023    MCV 94 02/21/2023     02/21/2023     Lab Results   Component Value Date     02/21/2023    K 3.8 02/21/2023    CL 96 (L) 02/21/2023    CO2 37 (H) 02/21/2023    BUN 17 02/21/2023    CREATININE 1.5 (H) 02/21/2023    GLUCOSE 114 (H) 02/21/2023    CALCIUM 9.2 02/21/2023    PROT 6.9 02/21/2023    LABALBU 3.4 (L) 02/21/2023    BILITOT 0.4 02/21/2023    ALKPHOS 203 (H) 02/21/2023    AST 17 02/21/2023    ALT 9 02/21/2023    GFRAA 46 (L) 12/03/2020    AGRATIO 1.0 (L) 02/21/2023    GLOB 3.5 02/21/2023       Lab Results   Component Value Date    IRON 32 02/21/2023    TIBC 227 (L) 02/21/2023    FERRITIN 117 02/21/2023             Assessment:     1. Iron deficiency anemia secondary to inadequate dietary iron intake          Plan:   Iron deficiency Anemia  Anemia of chronic disease  -- Completed 2 units of PRBC on  9/10/2020  -- Completed 4 doses of Venofer 10/29/2020   --  9/7/2021 CBC reported hemoglobin 12.5, hematocrit 29%, WBC 10.5, platelet 104,367. Iron saturation 20%, ferritin 138.  -- 4/13/2022 CBC reported hemoglobin 11.4, hematocrit 36.9%, WBC 10.3, platelet 603,661. Iron saturation 13%, ferritin 53. Creatinine 1.5, eGFR 32.4  -- S.p Venofer x 3  -- She reported chronic fatigue. Today I have reviewed with the patient about recent lab reports. -- 02/21/2023 WBC 12.2,  H/H 11.2/35.5, PLTs  277K, iron sat is 14%,   -- She report that she stop Iron oral replacement daily due to constipation and intolerance      Plan  -- Venofer x 3 doses will be ordered today    -- Continue lab check CBC, Iron profiles, ferritin  -- She was advised to f/u PCP/GI for her hemorrhoids management. Hypokalemia  -- 02/21/2023  K 3.8  -- She reported that she has been taking Lasix recently prescribed by PCP and was told to take 10 mEq of Potassium 3 times a week. .        Thrombocytosis, resolved:   -- Likely reactive 2/2 GLORIA  --- 02/21/2023 CBC reported hemoglobin 11.2, hematocrit 35.5%, WBC 12.2, platelet 828,062.           -- We will see the patient back in clinic in about 6 months. Always sooner if required. The patient can have lab done prior to our next clinic visit. No orders of the defined types were placed in this encounter. Sima King, DNP  3/1/2023      Please note: This document has been produced using voice recognition software. Unrecognized errors in transcription may be present.

## 2023-03-07 ENCOUNTER — TELEPHONE (OUTPATIENT)
Age: 83
End: 2023-03-07

## 2023-03-07 NOTE — TELEPHONE ENCOUNTER
Patient would like to get her iv iron done at [facility]. Order has been faxed to [facility].

## 2023-04-06 ENCOUNTER — HOSPITAL ENCOUNTER (OUTPATIENT)
Age: 83
Discharge: HOME | End: 2023-04-06
Payer: MEDICARE

## 2023-04-06 LAB
ANION GAP SERPL CALC-SCNC: 7 MMOL/L (ref 3–18)
BASOPHILS # BLD: 0.1 K/UL (ref 0–0.1)
BASOPHILS NFR BLD: 1 % (ref 0–2)
BUN SERPL-MCNC: 35 MG/DL (ref 7–18)
BUN/CREAT SERPL: 15 (ref 12–20)
CA-I BLD-MCNC: 8.7 MG/DL (ref 8.5–10.1)
CHLORIDE SERPL-SCNC: 101 MMOL/L (ref 100–111)
CO2 SERPL-SCNC: 36 MMOL/L (ref 21–32)
CREAT SERPL-MCNC: 2.38 MG/DL (ref 0.6–1.3)
DIFFERENTIAL METHOD BLD: ABNORMAL
EOSINOPHIL # BLD: 0.3 K/UL (ref 0–0.4)
EOSINOPHIL NFR BLD: 3 % (ref 0–5)
ERYTHROCYTE [DISTWIDTH] IN BLOOD BY AUTOMATED COUNT: 17.5 % (ref 11.6–14.5)
EST. AVERAGE GLUCOSE BLD GHB EST-MCNC: 146 MG/DL
GLUCOSE SERPL-MCNC: 92 MG/DL (ref 74–99)
HBA1C MFR BLD: 6.7 % (ref 4.2–5.6)
HCT VFR BLD AUTO: 35.6 % (ref 35–45)
HGB BLD-MCNC: 10.8 G/DL (ref 12–16)
IMM GRANULOCYTES # BLD AUTO: 0.1 K/UL (ref 0–0.04)
IMM GRANULOCYTES NFR BLD AUTO: 1 % (ref 0–0.5)
LYMPHOCYTES # BLD: 1.3 K/UL (ref 0.9–3.6)
LYMPHOCYTES NFR BLD: 14 % (ref 21–52)
MCH RBC QN AUTO: 30 PG (ref 24–34)
MCHC RBC AUTO-ENTMCNC: 30.3 G/DL (ref 31–37)
MCV RBC AUTO: 98.9 FL (ref 78–100)
MONOCYTES # BLD: 0.5 K/UL (ref 0.05–1.2)
MONOCYTES NFR BLD: 5 % (ref 3–10)
NEUTS SEG # BLD: 7.2 K/UL (ref 1.8–8)
NEUTS SEG NFR BLD: 76 % (ref 40–73)
NRBC # BLD: 0 K/UL (ref 0–0.01)
NRBC BLD-RTO: 0 PER 100 WBC
PLATELET # BLD AUTO: 227 K/UL (ref 135–420)
PMV BLD AUTO: 11.5 FL (ref 9.2–11.8)
POTASSIUM SERPL-SCNC: 3.8 MMOL/L (ref 3.5–5.5)
RBC # BLD AUTO: 3.6 M/UL (ref 4.2–5.3)
SODIUM SERPL-SCNC: 144 MMOL/L (ref 136–145)
WBC # BLD AUTO: 9.4 K/UL (ref 4.6–13.2)

## 2023-04-06 PROCEDURE — 85025 COMPLETE CBC W/AUTO DIFF WBC: CPT

## 2023-04-06 PROCEDURE — 80048 BASIC METABOLIC PNL TOTAL CA: CPT

## 2023-04-06 PROCEDURE — 83036 HEMOGLOBIN GLYCOSYLATED A1C: CPT

## 2023-05-27 ENCOUNTER — HOSPITAL ENCOUNTER (OUTPATIENT)
Age: 83
Discharge: HOME | End: 2023-05-30
Payer: MEDICARE

## 2023-05-27 LAB
ANION GAP SERPL CALC-SCNC: 7 MMOL/L (ref 3–18)
BUN SERPL-MCNC: 32 MG/DL (ref 7–18)
BUN/CREAT SERPL: 13 (ref 12–20)
CA-I BLD-MCNC: 8.4 MG/DL (ref 8.5–10.1)
CHLORIDE SERPL-SCNC: 98 MMOL/L (ref 100–111)
CO2 SERPL-SCNC: 36 MMOL/L (ref 21–32)
CREAT SERPL-MCNC: 2.45 MG/DL (ref 0.6–1.3)
GLUCOSE SERPL-MCNC: 108 MG/DL (ref 74–99)
POTASSIUM SERPL-SCNC: 3.7 MMOL/L (ref 3.5–5.5)
SODIUM SERPL-SCNC: 141 MMOL/L (ref 136–145)

## 2023-05-27 PROCEDURE — 80048 BASIC METABOLIC PNL TOTAL CA: CPT

## 2023-08-03 DIAGNOSIS — D75.839 THROMBOCYTOSIS: ICD-10-CM

## 2023-08-03 DIAGNOSIS — D50.8 IRON DEFICIENCY ANEMIA SECONDARY TO INADEQUATE DIETARY IRON INTAKE: Primary | ICD-10-CM

## (undated) DEVICE — SYR 50ML SLIP TIP NSAF LF STRL --

## (undated) DEVICE — GOWN ISOL IMPERV UNIV, DISP, OPEN BACK, BLUE --

## (undated) DEVICE — STERILE POLYISOPRENE POWDER-FREE SURGICAL GLOVES: Brand: PROTEXIS

## (undated) DEVICE — FLUFF AND POLYMER UNDERPAD,EXTRA HEAVY: Brand: WINGS

## (undated) DEVICE — ENDOSCOPY PUMP TUBING/ CAP SET: Brand: ERBE

## (undated) DEVICE — BASIN EMESIS 500CC ROSE 250/CS 60/PLT: Brand: MEDEGEN MEDICAL PRODUCTS, LLC

## (undated) DEVICE — SOLUTION IRRIG 1000ML H2O STRL BLT

## (undated) DEVICE — FORCEPS BX L240CM JAW DIA2.8MM L CAP W/ NDL MIC MESH TOOTH

## (undated) DEVICE — BASIN EMSIS 16OZ GRAPHITE PLAS KID SHP MOLD GRAD FOR ORAL

## (undated) DEVICE — GAUZE SPONGES,16 PLY: Brand: CURITY

## (undated) DEVICE — SNARE POLYP M W27MMXL240CM OVL STIFF DISP CAPTIVATOR

## (undated) DEVICE — BITE BLOCK ENDOSCP UNIV AD 6 TO 9.4 MM

## (undated) DEVICE — (D)GLOVE EXAM LG NITRL NS -- DISC BY MFR NO SUB

## (undated) DEVICE — CATHETER SUCT TR FL TIP 14FR W/ O CTRL

## (undated) DEVICE — CANNULA NSL AD TBNG L14FT STD PVC O2 CRV CONN NONFLARED NSL

## (undated) DEVICE — YANKAUER,SMOOTH HANDLE,HIGH CAPACITY: Brand: MEDLINE INDUSTRIES, INC.

## (undated) DEVICE — MEDI-VAC NON-CONDUCTIVE SUCTION TUBING: Brand: CARDINAL HEALTH

## (undated) DEVICE — SYR 20ML LL STRL LF --

## (undated) DEVICE — DEVICE INFL 60ML 12ATM CONVENIENT LOK REL HNDL HI PRSS FLX

## (undated) DEVICE — SYRINGE MED 25GA 3ML L5/8IN SUBQ PLAS W/ DETACH NDL SFTY

## (undated) DEVICE — ESOPHAGEAL BALLOON DILATATION CATHETER: Brand: CRE FIXED WIRE

## (undated) DEVICE — AIRLIFE™ NASAL OXYGEN CANNULA CURVED, FLARED TIP WITH 14 FOOT (4.3 M) CRUSH-RESISTANT TUBING, OVER-THE-EAR STYLE: Brand: AIRLIFE™

## (undated) DEVICE — FLEX ADVANTAGE 3000CC: Brand: FLEX ADVANTAGE

## (undated) DEVICE — SYR 10ML LUER LOK 1/5ML GRAD --

## (undated) DEVICE — FCPS RAD JAW 4LC 240CM W/NDL -- BX/20 RADIAL JAW 4

## (undated) DEVICE — LINER SUCT CANSTR 3000CC PLAS SFT PRE ASSEMB W/OUT TBNG W/

## (undated) DEVICE — GAUZE,SPONGE,4"X4",16PLY,STRL,LF,10/TRAY: Brand: MEDLINE

## (undated) DEVICE — MEDI-VAC SUCTION HIGH CAPACITY: Brand: CARDINAL HEALTH

## (undated) DEVICE — CANNULA ORIG TL CLR W FOAM CUSHIONS AND 14FT SUPL TB 3 CHN